# Patient Record
Sex: FEMALE | Race: WHITE | NOT HISPANIC OR LATINO | Employment: OTHER | URBAN - METROPOLITAN AREA
[De-identification: names, ages, dates, MRNs, and addresses within clinical notes are randomized per-mention and may not be internally consistent; named-entity substitution may affect disease eponyms.]

---

## 2017-01-13 ENCOUNTER — OFFICE VISIT (OUTPATIENT)
Dept: DERMATOLOGY | Facility: CLINIC | Age: 72
End: 2017-01-13

## 2017-01-13 ENCOUNTER — ONCOLOGY VISIT (OUTPATIENT)
Dept: ONCOLOGY | Facility: CLINIC | Age: 72
End: 2017-01-13
Payer: COMMERCIAL

## 2017-01-13 VITALS
RESPIRATION RATE: 18 BRPM | OXYGEN SATURATION: 97 % | SYSTOLIC BLOOD PRESSURE: 120 MMHG | HEART RATE: 96 BPM | WEIGHT: 167.5 LBS | TEMPERATURE: 97 F | DIASTOLIC BLOOD PRESSURE: 67 MMHG | BODY MASS INDEX: 30.63 KG/M2

## 2017-01-13 DIAGNOSIS — L81.4 SOLAR LENTIGINOSIS: Primary | ICD-10-CM

## 2017-01-13 DIAGNOSIS — L71.9 ROSACEA: ICD-10-CM

## 2017-01-13 DIAGNOSIS — Z79.899 ENCOUNTER FOR LONG-TERM CURRENT USE OF MEDICATION: ICD-10-CM

## 2017-01-13 DIAGNOSIS — Z12.11 COLON CANCER SCREENING: ICD-10-CM

## 2017-01-13 DIAGNOSIS — R20.2 NOTALGIA PARESTHETICA: ICD-10-CM

## 2017-01-13 DIAGNOSIS — C83.30 DLBCL (DIFFUSE LARGE B CELL LYMPHOMA) (H): Primary | ICD-10-CM

## 2017-01-13 DIAGNOSIS — L82.1 SK (SEBORRHEIC KERATOSIS): ICD-10-CM

## 2017-01-13 DIAGNOSIS — L21.9 DERMATITIS, SEBORRHEIC: ICD-10-CM

## 2017-01-13 DIAGNOSIS — D18.01 CHERRY ANGIOMA: ICD-10-CM

## 2017-01-13 DIAGNOSIS — Z51.81 ENCOUNTER FOR THERAPEUTIC DRUG MONITORING: ICD-10-CM

## 2017-01-13 DIAGNOSIS — C83.30 DLBCL (DIFFUSE LARGE B CELL LYMPHOMA) (H): ICD-10-CM

## 2017-01-13 DIAGNOSIS — Z12.31 VISIT FOR SCREENING MAMMOGRAM: ICD-10-CM

## 2017-01-13 LAB
ALBUMIN SERPL-MCNC: 3.6 G/DL (ref 3.4–5)
ALP SERPL-CCNC: 73 U/L (ref 40–150)
ALT SERPL W P-5'-P-CCNC: 26 U/L (ref 0–50)
ANION GAP SERPL CALCULATED.3IONS-SCNC: 7 MMOL/L (ref 3–14)
AST SERPL W P-5'-P-CCNC: 15 U/L (ref 0–45)
BASOPHILS # BLD AUTO: 0 10E9/L (ref 0–0.2)
BASOPHILS NFR BLD AUTO: 0.1 %
BILIRUB SERPL-MCNC: 0.2 MG/DL (ref 0.2–1.3)
BUN SERPL-MCNC: 19 MG/DL (ref 7–30)
CALCIUM SERPL-MCNC: 8.8 MG/DL (ref 8.5–10.1)
CHLORIDE SERPL-SCNC: 107 MMOL/L (ref 94–109)
CO2 SERPL-SCNC: 28 MMOL/L (ref 20–32)
CREAT SERPL-MCNC: 0.89 MG/DL (ref 0.52–1.04)
DIFFERENTIAL METHOD BLD: ABNORMAL
EOSINOPHIL # BLD AUTO: 0 10E9/L (ref 0–0.7)
EOSINOPHIL NFR BLD AUTO: 0.3 %
ERYTHROCYTE [DISTWIDTH] IN BLOOD BY AUTOMATED COUNT: 13.9 % (ref 10–15)
GFR SERPL CREATININE-BSD FRML MDRD: 62 ML/MIN/1.7M2
GLUCOSE SERPL-MCNC: 149 MG/DL (ref 70–99)
HCT VFR BLD AUTO: 40.3 % (ref 35–47)
HGB BLD-MCNC: 13.2 G/DL (ref 11.7–15.7)
LDH SERPL L TO P-CCNC: 191 U/L (ref 81–234)
LYMPHOCYTES # BLD AUTO: 0.4 10E9/L (ref 0.8–5.3)
LYMPHOCYTES NFR BLD AUTO: 5.9 %
MCH RBC QN AUTO: 30.3 PG (ref 26.5–33)
MCHC RBC AUTO-ENTMCNC: 32.8 G/DL (ref 31.5–36.5)
MCV RBC AUTO: 93 FL (ref 78–100)
MONOCYTES # BLD AUTO: 0.4 10E9/L (ref 0–1.3)
MONOCYTES NFR BLD AUTO: 5.2 %
NEUTROPHILS # BLD AUTO: 6.2 10E9/L (ref 1.6–8.3)
NEUTROPHILS NFR BLD AUTO: 88.5 %
PLATELET # BLD AUTO: 176 10E9/L (ref 150–450)
POTASSIUM SERPL-SCNC: 4.3 MMOL/L (ref 3.4–5.3)
PROT SERPL-MCNC: 6.3 G/DL (ref 6.8–8.8)
RBC # BLD AUTO: 4.35 10E12/L (ref 3.8–5.2)
SODIUM SERPL-SCNC: 142 MMOL/L (ref 133–144)
URATE SERPL-MCNC: 4.1 MG/DL (ref 2.6–6)
WBC # BLD AUTO: 7 10E9/L (ref 4–11)

## 2017-01-13 PROCEDURE — 80053 COMPREHEN METABOLIC PANEL: CPT | Performed by: INTERNAL MEDICINE

## 2017-01-13 PROCEDURE — 83615 LACTATE (LD) (LDH) ENZYME: CPT | Performed by: INTERNAL MEDICINE

## 2017-01-13 PROCEDURE — 84550 ASSAY OF BLOOD/URIC ACID: CPT | Performed by: INTERNAL MEDICINE

## 2017-01-13 PROCEDURE — 99214 OFFICE O/P EST MOD 30 MIN: CPT | Performed by: INTERNAL MEDICINE

## 2017-01-13 PROCEDURE — 85025 COMPLETE CBC W/AUTO DIFF WBC: CPT | Performed by: INTERNAL MEDICINE

## 2017-01-13 PROCEDURE — 36415 COLL VENOUS BLD VENIPUNCTURE: CPT | Performed by: INTERNAL MEDICINE

## 2017-01-13 ASSESSMENT — PAIN SCALES - GENERAL: PAINLEVEL: NO PAIN (0)

## 2017-01-13 NOTE — PROGRESS NOTES
Oncology Follow-up visit:  Date on this visit: 1/13/2017    Primary Care Physician:   Dr. Edwin Escalante  Rheumatology team: Abhijeet Lin.   cc note: Dr. Heath Koenig, [place], [place].  Rad Onc: Dr. Rosa, [facility]    Dx:  Diffuse large B cell lymphoma    Oncologic History:  She woke up on 3/19/2015 with R chest wall bump. CT chest with contrast on 3/31/2015 showed a soft tissue lesion inseparable from the right costosternal junction which measured 6.1x6.6cm. There was no associated mediastinal, hilar or axillary lymphadenopathy. She underwent an US and a biopsy of the chest wall lesion and pathology showed findings c/w Diffuse large B cell lymphoma, germinal center type large B-cell lymphoma. The lymphoma cells were CD20+, BCL-6+, and CD23+. BCL-2 demonstrated only scattered weak equivocal staining. There was no significant staining for CD30, CD10, MUM-1, or CD15 is noted in the cells of interest. Ki-67 demonstrated a high proliferation rate estimated at 90% of the tumor cells. Chromogenic in situ  hybridization for EBV (VÍCTOR) was  negative in the lymphomatous cells.  Bone marrow biopsy performed on 4/8/2015 showed no morphologic evidence of lymphoma. There was no immunophenotypic evidence of lymphoma.  She saw Dr. Benavidez with hematology/oncology in [facility] on 4/8/2015 and was recommended to enroll in a clinical trial, SOWG  where patients with early stage DLBCL are given RCHOPx3 followed by PET ct scan and it the PET is positive, she would get involved field radiation and Zevalin and if the PET is negaitve she would get an additional cycle of RCHOP.   She sought a second opinion with us.   PET/CT of the chest, abdomen and pelvis on 4/10/2015 showed multiple hypermetabolic soft tissue nodules in the right anterior chest wall as well as a hypermetabolic right supraclavicular lymph node (hypermetabolic, 1.2 x 1.6 cm).  There was a 3.0 x 5.1 cm soft tissue mass at  the right second sternocostal junction is hypermetabolic with a maximum SUV of 15.3 and a 0.8 x 2.0 cm soft tissue nodule between the third and fourth ribs anteriorly which was mildly hypermetabolic with a maximum SUV of 6.9. 0.7 x 1.9 and 1.4 x 2.4 cm soft tissue nodules at the fourth sternocostal junction have a maximal  SUV of 12.9 and 7.1, respectively. Additionally, there was a  2 mm right upper lobe pulmonary nodule.    Given extranodal disease, she had an LP performed at [facility] on 4/13/2015. 4cc clear CSF removed. 2 cc sent for flow cytometry. Cytology showed rare lymphocytes that showed smooth nuclear contours. Flow cytometry did not show a monoclonal B cell population.      She proceeded with cycle 1 of R-CHOP on 4/20/2015, with Neulasta support. She has received 3 cycles so far, last on 6/2/2015.  She has tolerated chemotherapy very well and her R chest wall mass has almost entirely disappeared over the course of the first 3-4 days of the first cycle.   She underwent PET/CT scan on 6/20/2015 which showed complete radiographic response to treatment.  She has completed 3000cGY to R anterior CW from 7/2/2015-7/22/2015 under direction of Dr. Foss in [place].        Echocardiogram 4/10/2015:  Left Ventricle EF of 60-65%. RV function normal. Mild left atrial enlargement.      History Of Present Illness:  Ms. Garcia is a 71 year old female who presents for follow-up of treated Diffuse large B cell lymphoma, as above.  She moved to [place] but continues to visit family in [facility] and continues her f/up with us. She was last seen by Dr. Sotomayor in September 2016, and at that time was continued on hydroxychloriquine and prednisone with plan for her to taper off prednisone when she establishes rheumatologic care in [place]. She still has pain in her joints, which she rates at 4/10.  Her last CT chest on 6/20/2016 showed no e/o locally recurrent or metastatic lymphoma.  Changes of radiation therapy  were less conspicuous.   Screening mammogram was negative on 6/20/2016. She was seen by dermatology earlier today in f/up of rosacea and another f/up is planned in 1 year. She denies fevers, chills, night sweats, weight loss. She has no other health related complaints.  In addition, a complete 12 point  review of systems is negative.    Past Medical/Surgical History:  Past Medical History   Diagnosis Date     Rheumatoid arthritis(714.0)      Chronic pain      Steroid long-term use      Psoriasis      Past Surgical History   Procedure Laterality Date     Surgical history of -   1/19/2011     Cataract, right eye     Extracapsular cataract extration with intraocular lens implant       right eye 1/2011     Arthroscopy knee bilateral       Synovectomy hip       not hip/ bilateral hands     Arthroplasty knee  9/20/2011     Procedure:ARTHROPLASTY KNEE; Left Total Knee Replacement ; Surgeon:BLAYNE GAYTAN; Location:[facility]     Phacoemulsification clear cornea with standard intraocular lens implant  4/16/2013     Procedure: PHACOEMULSIFICATION CLEAR CORNEA WITH STANDARD INTRAOCULAR LENS IMPLANT;  LEFT EYE PHACOEMULSIFICATION CLEAR CORNEA WITH STANDARD INTRAOCULAR LENS IMPLANT ;  Surgeon: Tawanda Sanchez MD;  Location: [facility]   She saw Dr. Sanchez in 2015. and had a L groin lesion biopsied and it was c/w psoriasis on Bx and she will be following up with him.   As far as RA, she was diagnosed in 2000, had focally erosive disease and has been on Enbrel and Remicade in the past. Prior to Dx of DLBCL she was on Humira and MTX.   FHx and Social Hx reviewed.    Allergies:  Allergies as of 01/13/2017 - reviewed 01/13/2017   Allergen Reaction Noted     Codeine Nausea and Vomiting 01/31/2006     Ibuprofen Itching 07/06/2005     Sulfa drugs Hives 01/31/2006     Current Medications:  Current Outpatient Prescriptions   Medication Sig Dispense Refill     azelaic acid (AZELEX) 20 % cream Apply topically 2 times daily 30 g 11      predniSONE (DELTASONE) 5 MG tablet Take 4 tablets (20 mg) daily for 2 weeks, then take 2 tablets (10 mg) daily for 2 weeks, then back to 5 mg daily. 98 tablet 0     hydroxychloroquine (PLAQUENIL) 200 MG tablet Take 2 tablets (400 mg) by mouth daily 180 tablet 0     predniSONE (DELTASONE) 5 MG tablet Take 1 tablet (5 mg) by mouth daily 90 tablet 0     omeprazole (PRILOSEC) 20 MG capsule Take 1 capsule (20 mg) by mouth daily 90 capsule 3     augmented betamethasone dipropionate (DIPROLENE AF) 0.05 % cream Apply topically 2 times daily 30 g 0     calcipotriene 0.005 % OINT Can use twice daily to affected areas of psoriasis, in between topical steroid uses. 120 g 1     triamcinolone (KENALOG) 0.1 % ointment Apply to affected area twice daily for two weeks at a time. 30 g 2     desonide (DESOWEN) 0.05 % ointment Apply topically 2 times daily Apply to affected areas of face twice daily until resolved, then taper to once daily for one week. 60 g 1     ketoconazole (NIZORAL) 2 % cream Apply topically 2 times daily 60 g 1     ORDER FOR DME Equipment being ordered: cranial prosthesis.  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 2 Units 0     MULTIPLE VITAMIN PO Take 1 tablet by mouth       benzonatate (TESSALON) 100 MG capsule Take 1 capsule (100 mg) by mouth 3 times daily as needed 42 capsule 1     Azelaic Acid (FINACEA) 15 % gel Apply 0.5 inches topically See Admin Instructions massage thin film gently into afected areas morning and evening 150 g 3     Cholecalciferol (VITAMIN D) 2000 UNITS CAPS Take 1 capsule by mouth daily.       Calcium Carbonate-Vitamin D (CALCIUM 600+D PO) Take 1,200 mg by mouth daily.       aspirin 81 MG tablet Take 1 tablet by mouth daily.       ORDER FOR DME A pair of foot orthotics. 1 Package 0     acetaminophen (TYLENOL) 325 MG tablet Take 2 tablets by mouth 3 times daily as needed. 60 tablet 0     magnesium oxide (MAG-OX) 400 MG tablet Take 1 tablet by mouth daily. 120  tablet 3        Physical Exam:  /67 mmHg  Pulse 96  Temp(Src) 97  F (36.1  C) (Oral)  Resp 18  Wt 75.978 kg (167 lb 8 oz)  SpO2 97%      GENERAL APPEARANCE: healthy, alert and no distress     HENT: Mouth without ulcers or lesions.      NECK: no adenopathy, no asymmetry or masses     LYMPHATICS: No cervical, supraclavicular, axillary lymphadenopathy b/l     RESP: lungs clear to auscultation - no rales, rhonchi or wheezes     CARDIOVASCULAR: regular rates and rhythm, normal S1 S2, no S3 or S4 and no murmur.     ABDOMEN:  soft, nontender, no HSM or masses and bowel sounds normal     MUSCULOSKELETAL: extremities normal- no gross deformities noted, no evidence of inflammation in joints, FROM in all extremities. No edema b/l LE.  Chest: No right chest wall masses. Incision healed well.      SKIN: no suspicious lesions or rashes     PSYCHIATRIC: mentation appears normal and affect normal  Breast: No axillary lymphadenopathy b/l.    Laboratory/Imaging Studies  Orders Only on 01/13/2017   Component Date Value Ref Range Status     WBC 01/13/2017 7.0  4.0 - 11.0 10e9/L Final     RBC Count 01/13/2017 4.35  3.8 - 5.2 10e12/L Final     Hemoglobin 01/13/2017 13.2  11.7 - 15.7 g/dL Final     Hematocrit 01/13/2017 40.3  35.0 - 47.0 % Final     MCV 01/13/2017 93  78 - 100 fl Final     MCH 01/13/2017 30.3  26.5 - 33.0 pg Final     MCHC 01/13/2017 32.8  31.5 - 36.5 g/dL Final     RDW 01/13/2017 13.9  10.0 - 15.0 % Final     Platelet Count 01/13/2017 176  150 - 450 10e9/L Final     Diff Method 01/13/2017 Automated Method   Final     % Neutrophils 01/13/2017 88.5   Final     % Lymphocytes 01/13/2017 5.9   Final     % Monocytes 01/13/2017 5.2   Final     % Eosinophils 01/13/2017 0.3   Final     % Basophils 01/13/2017 0.1   Final     Absolute Neutrophil 01/13/2017 6.2  1.6 - 8.3 10e9/L Final     Absolute Lymphocytes 01/13/2017 0.4* 0.8 - 5.3 10e9/L Final     Absolute Monocytes 01/13/2017 0.4  0.0 - 1.3 10e9/L Final     Absolute  Eosinophils 01/13/2017 0.0  0.0 - 0.7 10e9/L Final     Absolute Basophils 01/13/2017 0.0  0.0 - 0.2 10e9/L Final     Sodium 01/13/2017 142  133 - 144 mmol/L Final     Potassium 01/13/2017 4.3  3.4 - 5.3 mmol/L Final     Chloride 01/13/2017 107  94 - 109 mmol/L Final     Carbon Dioxide 01/13/2017 28  20 - 32 mmol/L Final     Anion Gap 01/13/2017 7  3 - 14 mmol/L Final     Glucose 01/13/2017 149* 70 - 99 mg/dL Final     Urea Nitrogen 01/13/2017 19  7 - 30 mg/dL Final     Creatinine 01/13/2017 0.89  0.52 - 1.04 mg/dL Final     GFR Estimate 01/13/2017 62  >60 mL/min/1.7m2 Final    Non  GFR Calc     GFR Estimate If Black 01/13/2017 75  >60 mL/min/1.7m2 Final    African American GFR Calc     Calcium 01/13/2017 8.8  8.5 - 10.1 mg/dL Final     Bilirubin Total 01/13/2017 0.2  0.2 - 1.3 mg/dL Final     Albumin 01/13/2017 3.6  3.4 - 5.0 g/dL Final     Protein Total 01/13/2017 6.3* 6.8 - 8.8 g/dL Final     Alkaline Phosphatase 01/13/2017 73  40 - 150 U/L Final     ALT 01/13/2017 26  0 - 50 U/L Final     AST 01/13/2017 15  0 - 45 U/L Final     Lactate Dehydrogenase 01/13/2017 191  81 - 234 U/L Final     Uric Acid 01/13/2017 4.1  2.6 - 6.0 mg/dL Final     BG was post-prandial after having white chocolate and cashews    ASSESSMENT/PLAN:  The patient is a very pleasant 71 year old woman with stage IIAE Diffuse large B cell lymphoma involving right anterior chest wall, right supraclavicular LN and eroding into sternum. She falls in the low-intermediate risk IPI group when not adjusted for age, with IPI score of 2 which with associated with 67% CR rate with treatment and 51% 5 year overall survival rate. If adjusted for age, she would fall into low risk or IPI of 0 score, associated with CR rate of 91% and 56% 5 year overall survival.  She has had an excellent clinical response after one cycle of RCHOP and is in complete radiographic CR after 3 cycles of  R-CHOP, last on 6/2/2015. She has completed 3000cGY to R  anterior CW from 7/2/2015-7/22/2015 under direction of Dr. Foss in [place].      1. Diffuse large B cell lymphoma -  No e/o disease recurrence on CT chest form 6/2016. No e/o disease recurrence clinically either.  Per NCCN guidelines (V2.2015) f/up of stage II Diffuse large B cell lymphoma includes H&P and labs every 3-6 months for 5 years, then annually and imaging as clinically indicated.  F/up in 6 months with labs. Patient plans to return to [place] for a couple of weeks in June    2. Breast Cancer Screening - b/l screening mammogram negative 6/20/2016. F/up in one year (due 06/2017).  3. Colon Cancer Screening - colonoscopy on 4/16/2015. The exam was normal and repeat was recommended in 5 years for screening purposes     4. RA - currently off MTX.  She is on plaquenil and  Prednisone. F/up with rheumatology in [place]. She is considering trying Rituxan to control s/o RA. I have no objections to that.    5. Social - She lives in [place] now with her daughter's family but comes back to [place] to visit family here to and prefers to f/up with us when she is here.  6. Psoriasis- f/up with dermatology in one year - due 01/2018.    At the end of our visit patient verbalized understanding and concurred with the plan.

## 2017-01-13 NOTE — NURSING NOTE
"Randi Garcia is a 71 year old female who presents for:  Chief Complaint   Patient presents with     Oncology Clinic Visit     6 month follow up        Initial Vitals:  /67 mmHg  Pulse 96  Temp(Src) 97  F (36.1  C) (Oral)  Resp 18  Wt 75.978 kg (167 lb 8 oz)  SpO2 97% Estimated body mass index is 30.63 kg/(m^2) as calculated from the following:    Height as of 6/24/16: 1.575 m (5' 2.01\").    Weight as of this encounter: 75.978 kg (167 lb 8 oz).. There is no height on file to calculate BSA. BP completed using cuff size: regular  Data Unavailable No LMP recorded. Patient is postmenopausal. Allergies and medications reviewed.     Medications: Medication refills not needed today.  Pharmacy name entered into EPIC:    [facility]  [facility] [facility] - [facility], [place] - [address] [place] [address] [facility] [place] - [address] [place] [address] [address] [address] [place], [place] [address] [place] [address] - [place], [place] - [address] [id number] - [place], [place] - [address] [id number] - [facility] [address]    Comments:     10 minutes for nursing intake (face to face time)   Rose Abbott LPN          "

## 2017-01-13 NOTE — LETTER
"1/13/2017       RE: Randi Garcia  [address] [facility] [address]     Dear Colleague,    Thank you for referring your patient, Randi Garcia, to the [facility] DERMATOLOGY at [facility]. Please see a copy of my visit note below.    [facility] Dermatology Note    Dermatology Problem List:  1. Rosacea- using azelaic acid 15% bid.   2. Seborrheic dermatitis- using desonide ointment bid prn  3. Inverse psoriasis- resolved  4. History of B cell lymphoma- s/p chemo (R-CHOP) and radiation to the chest wall    Encounter Date: Jan 13, 2017    CC:  Chief Complaint   Patient presents with     Skin Check     Randi comes to clinic for a skin check. 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  Mrs. Garcia is currently using azelaic acid on her face once daily.  She likes this and feels that it mostly keeps skin under control. She has had more scaling on her face as of recently.  Her only new concern is itching on the right side of her back.  Her daughter has looked at the skin and told her there is no rash.  She wonders what this could be from.  She denies any tender, rapidly growing, or nonhealing skin lesions today.  She denies any past history of skin cancer.      She is feeling well otherwise, without other concerning skin lesions.      Past Medical History:   Past Medical History   Diagnosis Date     Rheumatoid arthritis(714.0) (H)      Chronic pain      Steroid long-term use      Psoriasis      Past Surgical History   Procedure Laterality Date     Surgical history of -   1/19/2011     Cataract, right eye     Extracapsular cataract extration with intraocular lens implant       right eye 1/2011     Arthroscopy knee bilateral       " Synovectomy hip       not hip/ bilateral hands     Arthroplasty knee  9/20/2011     Procedure:ARTHROPLASTY KNEE; Left Total Knee Replacement ; Surgeon:BLAYNE GAYTAN; Location[facility]     Phacoemulsification clear cornea with standard intraocular lens implant  4/16/2013     Procedure: PHACOEMULSIFICATION CLEAR CORNEA WITH STANDARD INTRAOCULAR LENS IMPLANT;  LEFT EYE PHACOEMULSIFICATION CLEAR CORNEA WITH STANDARD INTRAOCULAR LENS IMPLANT ;  Surgeon: Tawanda Sanchez MD;  Location: [facility]     Medications:  Current Outpatient Prescriptions   Medication Sig Dispense Refill     predniSONE (DELTASONE) 5 MG tablet Take 4 tablets (20 mg) daily for 2 weeks, then take 2 tablets (10 mg) daily for 2 weeks, then back to 5 mg daily. 98 tablet 0     hydroxychloroquine (PLAQUENIL) 200 MG tablet Take 2 tablets (400 mg) by mouth daily 180 tablet 0     predniSONE (DELTASONE) 5 MG tablet Take 1 tablet (5 mg) by mouth daily 90 tablet 0     omeprazole (PRILOSEC) 20 MG capsule Take 1 capsule (20 mg) by mouth daily 90 capsule 3     augmented betamethasone dipropionate (DIPROLENE AF) 0.05 % cream Apply topically 2 times daily 30 g 0     calcipotriene 0.005 % OINT Can use twice daily to affected areas of psoriasis, in between topical steroid uses. 120 g 1     triamcinolone (KENALOG) 0.1 % ointment Apply to affected area twice daily for two weeks at a time. 30 g 2     desonide (DESOWEN) 0.05 % ointment Apply topically 2 times daily Apply to affected areas of face twice daily until resolved, then taper to once daily for one week. 60 g 1     ketoconazole (NIZORAL) 2 % cream Apply topically 2 times daily 60 g 1     ORDER FOR DME Equipment being ordered: cranial prosthesis.  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 2 Units 0     MULTIPLE VITAMIN PO Take 1 tablet by mouth       benzonatate (TESSALON) 100 MG capsule Take 1 capsule (100 mg) by mouth 3 times daily as needed 42 capsule 1     Azelaic Acid (FINACEA) 15 %  gel Apply 0.5 inches topically See Admin Instructions massage thin film gently into afected areas morning and evening 150 g 3     Cholecalciferol (VITAMIN D) 2000 UNITS CAPS Take 1 capsule by mouth daily.       Calcium Carbonate-Vitamin D (CALCIUM 600+D PO) Take 1,200 mg by mouth daily.       aspirin 81 MG tablet Take 1 tablet by mouth daily.       ORDER FOR DME A pair of foot orthotics. 1 Package 0     acetaminophen (TYLENOL) 325 MG tablet Take 2 tablets by mouth 3 times daily as needed. 60 tablet 0     magnesium oxide (MAG-OX) 400 MG tablet Take 1 tablet by mouth daily. 120 tablet 3       Allergies   Allergen Reactions     Codeine Nausea and Vomiting     Ibuprofen Itching     Sulfa Drugs Hives     Social History:  Recently moved to [place].  Spending more time outdoors.    Family History:  Granddaughter with psoriasis    Review of Systems:  -Constitutional:  Feeling well, in usual state of health.  -Skin:  As per HPI, no additional concerns.    Physical exam:  There were no vitals taken for this visit.  -General:  This is a well developed, well-nourished female in no acute distress, in a pleasant mood.    -Skin:  Total skin excluding the undergarment areas was performed. The exam included the head/face, neck, both arms, chest, back, abdomen, both legs, digits and/or nails.   -Mild pink erythema with telangiectasias on the bilateral cheeks.  There are no papules or pustules.  -No crusting or scaling of the face.  -Light brown circular macule in sun exposed areas on the upper extremities, chest, upper back, and face.  All have uniform pigmentation.  -Pink dome shaped papules on the trunk.  -No skin lesions on area just to the right of midline near the scapula on the back.  -Brown and tan, waxy, stuck on appearing papules on the trunk.  -No other lesions of concern on areas examined.     Impression/Plan:  1. Rosacea:  Under good control at present with only remaining telangiectasias and no papular/pustular lesions.   Discussed laser treatments for telangiectasias.  Patient not interested at this time.  Encouraged increasing azelaic acid 20% cream use up to twice daily for control of papular/pustular lesions should they recur.    2. Solar lentigines: Discussed that these are a marker of past sun damage, but are harmless.  Recommended use of a broad spectrum sunscreen of at least SPF 30 on all sun exposed sites daily.  Apply 20 minutes prior to exposure and repeat application every two hours or after sweating or swimming.  Avoid any intentional indoor or outdoor tanning.      3. Cherry angiomas: Reassured of benign nature.  No need for further treatment.    4. Seborrheic keratoses:  Reassured of benign nature.  No need for further treatment.    5. Notalgia paresthetica:  Discussed etiology of back itch in detail.  Recommended use of over the counter Sarna lotion with camphor and methol to be used as needed.    6. Seb derm:  No currently active.  Recommended gentle skin care only at this time.    Follow up in one year for skin check.    Seen and examined with Dr. Sawyer Sanchez.    Kennedi Coughlin MD  PGY-3, Dermatology  Randi Garcia was seen and examined by my self with the resident. I have reviewed and agree with assesment and plan of care.       Again, thank you for allowing me to participate in the care of your patient.      Sincerely,    Sawyer Sanchez MD

## 2017-01-13 NOTE — PATIENT INSTRUCTIONS
For moisturizing the face, we like Vanicream, Cetaphil, and Cerave brands.  See dry skin care for more details.    For rosacea on the face, continue the azelaic acid.  You can apply this twice daily.                Dry Skin    What is dry skin?    Common skin problem    Can be worse during the winter     Affects all ages    Occurs in people with or without other skin problems    What does it look like?    Fine lines in the skin become more visible     Rough feeling skin     Flaky skin    Most common on the arms and legs    Skin can become cracked, especially on the hands and feet    What are some problems caused by dry skin?     Itching    Rubbing or scratching can cause thickened, rough skin patches    Cracks in skin can be painful    Red, itchy, scaly skin (called eczema) can occur    Yellow crusting or pus could be signs of an infection    What causes dry skin?    A lack of water in the top layer of the skin    Too much soapy water,  hot water, or harsh chemicals    Aging and sun damage    How do I treat dry skin?    Shower or bathe daily for under ten minutes with lukewarm water and mild soap.    Pat yourself dry with a towel gently and leave your skin slightly damp.    Use moisturizing cream or ointment right away.  Avoid lotions.    What kind of mild soap should I be using?    Camay , Dove , Tone , Neutrogena , Purpose , or Oil of Olay     A non-detergent cleanser, like Cetaphil , can be used.    What should I stay away from?    Scented soaps     Bath oils    What moisturizers should I be using?    Cetaphil Cream,CeraVe Cream, Vanicream, Aquaphilic, Eucerin, Aquaphor, or Vaseline     Always apply after showering or bathing.    Reapply throughout the day, if possible.    If dry skin affects your hands, always reapply after handwashing.    What else should I know?    Using a humidifier during winter months may help.    If dry skin gets worse or if eczema develops, a steroid cream may be needed.

## 2017-01-13 NOTE — NURSING NOTE
"Dermatology Rooming Note    Randi Garcia's goals for this visit include:   Chief Complaint   Patient presents with     Skin Check     Randi comes to clinic for a skin check. Randi states \" my rosacea is scaling.\"     Elsi Solorzano LPN  "

## 2017-01-13 NOTE — MR AVS SNAPSHOT
After Visit Summary   1/13/2017    Randi Garcia    MRN: [id number]           Patient Information     Date Of Birth          1945        Visit Information        Provider Department      1/13/2017 10:15 AM Sawyer Sanchez MD M [facility] Dermatology        Today's Diagnoses     Solar lentiginosis    -  1     Cherry angioma         SK (seborrheic keratosis)         Dermatitis, seborrheic         Rosacea         Notalgia paresthetica           Care Instructions    For moisturizing the face, we like Vanicream, Cetaphil, and Cerave brands.  See dry skin care for more details.    For rosacea on the face, continue the azelaic acid.  You can apply this twice daily.                Dry Skin    What is dry skin?    Common skin problem    Can be worse during the winter     Affects all ages    Occurs in people with or without other skin problems    What does it look like?    Fine lines in the skin become more visible     Rough feeling skin     Flaky skin    Most common on the arms and legs    Skin can become cracked, especially on the hands and feet    What are some problems caused by dry skin?     Itching    Rubbing or scratching can cause thickened, rough skin patches    Cracks in skin can be painful    Red, itchy, scaly skin (called eczema) can occur    Yellow crusting or pus could be signs of an infection    What causes dry skin?    A lack of water in the top layer of the skin    Too much soapy water,  hot water, or harsh chemicals    Aging and sun damage    How do I treat dry skin?    Shower or bathe daily for under ten minutes with lukewarm water and mild soap.    Pat yourself dry with a towel gently and leave your skin slightly damp.    Use moisturizing cream or ointment right away.  Avoid lotions.    What kind of mild soap should I be using?    Camay , Dove , Tone , Neutrogena , Purpose , or Oil of Olay     A non-detergent cleanser, like Cetaphil , can be used.    What should I stay away  from?    Scented soaps     Bath oils    What moisturizers should I be using?    Cetaphil Cream,CeraVe Cream, Vanicream, Aquaphilic, Eucerin, Aquaphor, or Vaseline     Always apply after showering or bathing.    Reapply throughout the day, if possible.    If dry skin affects your hands, always reapply after handwashing.    What else should I know?    Using a humidifier during winter months may help.    If dry skin gets worse or if eczema develops, a steroid cream may be needed.                        Follow-ups after your visit        Your next 10 appointments already scheduled     Jan 13, 2017  2:45 PM   LAB with [facility])    [id number] [place] [id number] [phone]           Patient must bring picture ID.  Patient should be prepared to give a urine specimen  Please do not eat 10-12 hours before your appointment if you are coming in fasting for labs on lipids, cholesterol, or glucose (sugar).  Pregnant women should follow their Care Team instructions. Water with medications is okay. Do not drink coffee or other fluids.   If you have concerns about taking  your medications, please ask at office or if scheduling via [employer], send a message by clicking on Secure Messaging, Message Your Care Team.            Jan 13, 2017  3:30 PM   Return Visit with Sue Garcia MD   [facility])    [id number] [place] [id number] [phone]            Mar 24, 2017  8:30 AM   Return Visit with Martinez Sotomayor MD   [facility])    [address] [place] [id number] [phone]              Who to contact     Please call your clinic at [phone] to:    Ask questions about your health    Make or cancel appointments    Discuss your medicines    Learn about your test results    Speak to your doctor   If you have  compliments or concerns about an experience at your clinic, or if you wish to file a complaint, please contact [facility] Patient Relations at [phone] or email us at [email or web address]         Additional Information About Your Visit        MyChart Information     [employer] gives you secure access to your electronic health record. If you see a primary care provider, you can also send messages to your care team and make appointments. If you have questions, please call your primary care clinic.  If you do not have a primary care provider, please call [phone] and they will assist you.      [employer] is an electronic gateway that provides easy, online access to your medical records. With [employer], you can request a clinic appointment, read your test results, renew a prescription or communicate with your care team.     To access your existing account, please contact your [facility] or call [phone] for assistance.        Care EveryWhere ID     This is your Care EveryWhere ID. This could be used by other organizations to access your [place] medical records  [id number]         Blood Pressure from Last 3 Encounters:   09/23/16 136/67   06/24/16 124/70   05/06/16 122/61    Weight from Last 3 Encounters:   06/24/16 77.565 kg (171 lb)   05/06/16 78.245 kg (172 lb 8 oz)   03/08/16 76.93 kg (169 lb 9.6 oz)              Today, you had the following     No orders found for display         Today's Medication Changes          These changes are accurate as of: 1/13/17 11:08 AM.  If you have any questions, ask your nurse or doctor.               Start taking these medicines.        Dose/Directions    azelaic acid 20 % cream   Commonly known as:  AZELEX   Used for:  Rosacea   Started by:  Sawyer Sanchez MD        Apply topically 2 times daily   Quantity:  30 g   Refills:  11            Where to get your medicines      These medications were sent to  [facility] - [place], [place] - [address] [place] [address]  [address] [place] [address]     Phone:  [phone]    - azelaic acid 20 % cream             Primary Care Provider Office Phone # Fax #    Randal Franco [phone]       [facility] [address] [facility] [address]  [facility] [address]        Thank you!     Thank you for choosing [facility] DERMATOLOGY  for your care. Our goal is always to provide you with excellent care. Hearing back from our patients is one way we can continue to improve our services. Please take a few minutes to complete the written survey that you may receive in the mail after your visit with us. Thank you!             Your Updated Medication List - Protect others around you: Learn how to safely use, store and throw away your medicines at www.disposemymeds.org.          This list is accurate as of: 1/13/17 11:08 AM.  Always use your most recent med list.                   Brand Name Dispense Instructions for use    acetaminophen 325 MG tablet    TYLENOL    60 tablet    Take 2 tablets by mouth 3 times daily as needed.       aspirin 81 MG tablet      Take 1 tablet by mouth daily.       augmented betamethasone dipropionate 0.05 % cream    DIPROLENE AF    30 g    Apply topically 2 times daily       Azelaic Acid 15 % gel    FINACEA    150 g    Apply 0.5 inches topically See Admin Instructions massage thin film gently into afected areas morning and evening       azelaic acid 20 % cream    AZELEX    30 g    Apply topically 2 times daily       benzonatate 100 MG capsule    TESSALON    42 capsule    Take 1 capsule (100 mg) by mouth 3 times daily as needed       calcipotriene 0.005 % Oint     120 g    Can use twice daily to affected areas of psoriasis, in between topical steroid uses.       CALCIUM 600+D PO      Take 1,200 mg by mouth daily.       desonide 0.05 % ointment    DESOWEN    60 g    Apply topically 2 times daily Apply to affected areas of face twice  daily until resolved, then taper to once daily for one week.       hydroxychloroquine 200 MG tablet    PLAQUENIL    180 tablet    Take 2 tablets (400 mg) by mouth daily       ketoconazole 2 % cream    NIZORAL    60 g    Apply topically 2 times daily       magnesium oxide 400 MG tablet    MAG-OX    120 tablet    Take 1 tablet by mouth daily.       MULTIPLE VITAMIN PO      Take 1 tablet by mouth       omeprazole 20 MG CR capsule    priLOSEC    90 capsule    Take 1 capsule (20 mg) by mouth daily       order for DME     1 Package    A pair of foot orthotics.       order for DME     2 Units    Equipment being ordered: cranial prosthesis. 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       * predniSONE 5 MG tablet    DELTASONE    90 tablet    Take 1 tablet (5 mg) by mouth daily       * predniSONE 5 MG tablet    DELTASONE    98 tablet    Take 4 tablets (20 mg) daily for 2 weeks, then take 2 tablets (10 mg) daily for 2 weeks, then back to 5 mg daily.       triamcinolone 0.1 % ointment    KENALOG    30 g    Apply to affected area twice daily for two weeks at a time.       vitamin D 2000 UNITS Caps      Take 1 capsule by mouth daily.       * Notice:  This list has 2 medication(s) that are the same as other medications prescribed for you. Read the directions carefully, and ask your doctor or other care provider to review them with you.

## 2017-01-13 NOTE — PROGRESS NOTES
"[facility] Dermatology Note    Dermatology Problem List:  1. Rosacea- using azelaic acid 15% bid.   2. Seborrheic dermatitis- using desonide ointment bid prn  3. Inverse psoriasis- resolved  4. History of B cell lymphoma- s/p chemo (R-CHOP) and radiation to the chest wall    Encounter Date: Jan 13, 2017    CC:  Chief Complaint   Patient presents with     Skin Check     Randi comes to clinic for a skin check. 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  Mrs. Garcia is currently using azelaic acid on her face once daily.  She likes this and feels that it mostly keeps skin under control. She has had more scaling on her face as of recently.  Her only new concern is itching on the right side of her back.  Her daughter has looked at the skin and told her there is no rash.  She wonders what this could be from.  She denies any tender, rapidly growing, or nonhealing skin lesions today.  She denies any past history of skin cancer.      She is feeling well otherwise, without other concerning skin lesions.      Past Medical History:   Past Medical History   Diagnosis Date     Rheumatoid arthritis(714.0) (H)      Chronic pain      Steroid long-term use      Psoriasis      Past Surgical History   Procedure Laterality Date     Surgical history of -   1/19/2011     Cataract, right eye     Extracapsular cataract extration with intraocular lens implant       right eye 1/2011     Arthroscopy knee bilateral       Synovectomy hip       not hip/ bilateral hands     Arthroplasty knee  9/20/2011     Procedure:ARTHROPLASTY KNEE; Left Total Knee Replacement ; Surgeon:BLAYNE GAYTAN; Location:[facility] OR     Phacoemulsification clear cornea with standard intraocular lens implant  4/16/2013     " Procedure: PHACOEMULSIFICATION CLEAR CORNEA WITH STANDARD INTRAOCULAR LENS IMPLANT;  LEFT EYE PHACOEMULSIFICATION CLEAR CORNEA WITH STANDARD INTRAOCULAR LENS IMPLANT ;  Surgeon: Tawanda Sanchez MD;  Location: [facility]     Medications:  Current Outpatient Prescriptions   Medication Sig Dispense Refill     predniSONE (DELTASONE) 5 MG tablet Take 4 tablets (20 mg) daily for 2 weeks, then take 2 tablets (10 mg) daily for 2 weeks, then back to 5 mg daily. 98 tablet 0     hydroxychloroquine (PLAQUENIL) 200 MG tablet Take 2 tablets (400 mg) by mouth daily 180 tablet 0     predniSONE (DELTASONE) 5 MG tablet Take 1 tablet (5 mg) by mouth daily 90 tablet 0     omeprazole (PRILOSEC) 20 MG capsule Take 1 capsule (20 mg) by mouth daily 90 capsule 3     augmented betamethasone dipropionate (DIPROLENE AF) 0.05 % cream Apply topically 2 times daily 30 g 0     calcipotriene 0.005 % OINT Can use twice daily to affected areas of psoriasis, in between topical steroid uses. 120 g 1     triamcinolone (KENALOG) 0.1 % ointment Apply to affected area twice daily for two weeks at a time. 30 g 2     desonide (DESOWEN) 0.05 % ointment Apply topically 2 times daily Apply to affected areas of face twice daily until resolved, then taper to once daily for one week. 60 g 1     ketoconazole (NIZORAL) 2 % cream Apply topically 2 times daily 60 g 1     ORDER FOR DME Equipment being ordered: cranial prosthesis.  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 2 Units 0     MULTIPLE VITAMIN PO Take 1 tablet by mouth       benzonatate (TESSALON) 100 MG capsule Take 1 capsule (100 mg) by mouth 3 times daily as needed 42 capsule 1     Azelaic Acid (FINACEA) 15 % gel Apply 0.5 inches topically See Admin Instructions massage thin film gently into afected areas morning and evening 150 g 3     Cholecalciferol (VITAMIN D) 2000 UNITS CAPS Take 1 capsule by mouth daily.       Calcium Carbonate-Vitamin D (CALCIUM 600+D PO) Take 1,200 mg by  mouth daily.       aspirin 81 MG tablet Take 1 tablet by mouth daily.       ORDER FOR DME A pair of foot orthotics. 1 Package 0     acetaminophen (TYLENOL) 325 MG tablet Take 2 tablets by mouth 3 times daily as needed. 60 tablet 0     magnesium oxide (MAG-OX) 400 MG tablet Take 1 tablet by mouth daily. 120 tablet 3       Allergies   Allergen Reactions     Codeine Nausea and Vomiting     Ibuprofen Itching     Sulfa Drugs Hives     Social History:  Recently moved to [place].  Spending more time outdoors.    Family History:  Granddaughter with psoriasis    Review of Systems:  -Constitutional:  Feeling well, in usual state of health.  -Skin:  As per HPI, no additional concerns.    Physical exam:  There were no vitals taken for this visit.  -General:  This is a well developed, well-nourished female in no acute distress, in a pleasant mood.    -Skin:  Total skin excluding the undergarment areas was performed. The exam included the head/face, neck, both arms, chest, back, abdomen, both legs, digits and/or nails.   -Mild pink erythema with telangiectasias on the bilateral cheeks.  There are no papules or pustules.  -No crusting or scaling of the face.  -Light brown circular macule in sun exposed areas on the upper extremities, chest, upper back, and face.  All have uniform pigmentation.  -Pink dome shaped papules on the trunk.  -No skin lesions on area just to the right of midline near the scapula on the back.  -Brown and tan, waxy, stuck on appearing papules on the trunk.  -No other lesions of concern on areas examined.     Impression/Plan:  1. Rosacea:  Under good control at present with only remaining telangiectasias and no papular/pustular lesions.  Discussed laser treatments for telangiectasias.  Patient not interested at this time.  Encouraged increasing azelaic acid 20% cream use up to twice daily for control of papular/pustular lesions should they recur.    2. Solar lentigines: Discussed that these are a marker of  past sun damage, but are harmless.  Recommended use of a broad spectrum sunscreen of at least SPF 30 on all sun exposed sites daily.  Apply 20 minutes prior to exposure and repeat application every two hours or after sweating or swimming.  Avoid any intentional indoor or outdoor tanning.      3. Cherry angiomas: Reassured of benign nature.  No need for further treatment.    4. Seborrheic keratoses:  Reassured of benign nature.  No need for further treatment.    5. Notalgia paresthetica:  Discussed etiology of back itch in detail.  Recommended use of over the counter Sarna lotion with camphor and methol to be used as needed.    6. Seb derm:  No currently active.  Recommended gentle skin care only at this time.    Follow up in one year for skin check.    Seen and examined with Dr. Sawyer Sanchez.    Kennedi Coughlin MD  PGY-3, Dermatology  Randi Garcia was seen and examined by my self with the resident. I have reviewed and agree with assesment and plan of care.   Mich Sanchez

## 2017-01-16 DIAGNOSIS — Z96.60 H/O ARTHROPLASTY: Primary | ICD-10-CM

## 2017-01-16 RX ORDER — AMOXICILLIN 500 MG/1
2000 TABLET, FILM COATED ORAL ONCE
Qty: 4 TABLET | Refills: 3 | Status: SHIPPED | OUTPATIENT
Start: 2017-01-16 | End: 2017-01-16

## 2017-01-16 NOTE — TELEPHONE ENCOUNTER
Patient calling for antibiotics prior to her dental procedure. She is s/p total hip. Orders placed per standing order and sent to [facility] pharmacy per patient request.

## 2017-02-02 ENCOUNTER — MYC MEDICAL ADVICE (OUTPATIENT)
Dept: RHEUMATOLOGY | Facility: CLINIC | Age: 72
End: 2017-02-02

## 2017-02-02 DIAGNOSIS — M94.9 DISORDER OF BONE AND CARTILAGE: ICD-10-CM

## 2017-02-02 DIAGNOSIS — Z79.60 LONG-TERM USE OF IMMUNOSUPPRESSANT MEDICATION: ICD-10-CM

## 2017-02-02 DIAGNOSIS — M89.9 DISORDER OF BONE AND CARTILAGE: ICD-10-CM

## 2017-02-02 DIAGNOSIS — M06.09 RHEUMATOID ARTHRITIS OF MULTIPLE SITES WITH NEGATIVE RHEUMATOID FACTOR (H): Primary | ICD-10-CM

## 2017-02-02 DIAGNOSIS — Z79.899 ENCOUNTER FOR LONG-TERM CURRENT USE OF MEDICATION: ICD-10-CM

## 2017-02-06 RX ORDER — HYDROXYCHLOROQUINE SULFATE 200 MG/1
400 TABLET, FILM COATED ORAL DAILY
Qty: 180 TABLET | Refills: 0 | Status: SHIPPED | OUTPATIENT
Start: 2017-02-06

## 2017-02-06 NOTE — TELEPHONE ENCOUNTER
Refill request from: Patient  Medication requested: Plaquenil  Prescription Written: 9/23/16  Last filled: N/A  Last qty: 180  Pt's last office visit: 92/23/16  Next scheduled office visit: 3/24/17 ([facility] asked [facility]. Referral placed and will have clinic fax appropriate information with referral.    Last ophthalmology visit: Received last eye exam completed on 6/28/2016 at [facility] with Dr. Jevon Persaud; plan: RTC in one year.  Routing refill request to provider for review/approval because:  Medication meets criteria to refill though Drug not on the [facility] [place] RN refill protocol           WBC   Date Value Ref Range Status   01/13/2017 7.0 4.0 - 11.0 10e9/L Final     RBC COUNT   Date Value Ref Range Status   01/13/2017 4.35 3.8 - 5.2 10e12/L Final     HEMOGLOBIN   Date Value Ref Range Status   01/13/2017 13.2 11.7 - 15.7 g/dL Final     HEMATOCRIT   Date Value Ref Range Status   01/13/2017 40.3 35.0 - 47.0 % Final     MCV   Date Value Ref Range Status   01/13/2017 93 78 - 100 fl Final     MCH   Date Value Ref Range Status   01/13/2017 30.3 26.5 - 33.0 pg Final     MCHC   Date Value Ref Range Status   01/13/2017 32.8 31.5 - 36.5 g/dL Final     RDW   Date Value Ref Range Status   01/13/2017 13.9 10.0 - 15.0 % Final     PLATELET COUNT   Date Value Ref Range Status   01/13/2017 176 150 - 450 10e9/L Final   12/21/2015 181 150 - 450 10^9/L Final     AST   Date Value Ref Range Status   01/13/2017 15 0 - 45 U/L Final     ALT   Date Value Ref Range Status   01/13/2017 26 0 - 50 U/L Final     CREATININE   Date Value Ref Range Status   01/13/2017 0.89 0.52 - 1.04 mg/dL Final     ALBUMIN   Date Value Ref Range Status   01/13/2017 3.6 3.4 - 5.0 g/dL Final     COLOR URINE (no units)   Date Value   04/10/2015 Light Yellow     APPEARANCE URINE (no units)   Date Value   04/10/2015 Clear     GLUCOSE URINE (mg/dL)   Date Value   04/10/2015 Negative     BILIRUBIN URINE (no units)    Date Value   04/10/2015 Negative     KETONES URINE (mg/dL)   Date Value   04/10/2015 Negative     SPECIFIC GRAVITY URINE (no units)   Date Value   04/10/2015 1.050*     PH URINE (pH)   Date Value   04/10/2015 6.5     PROTEIN ALBUMIN URINE (mg/dL)   Date Value   04/10/2015 Negative     UROBILINOGEN URINE (EU/dL)   Date Value   01/10/2012 0.2     NITRITE URINE (no units)   Date Value   04/10/2015 Negative     LEUKOCYTE ESTERASE URINE (no units)   Date Value   04/10/2015 Negative       Teena Holland, RN, BSN  [facility]

## 2017-04-03 ENCOUNTER — TRANSFERRED RECORDS (OUTPATIENT)
Dept: HEALTH INFORMATION MANAGEMENT | Facility: CLINIC | Age: 72
End: 2017-04-03

## 2017-04-28 ENCOUNTER — CARE COORDINATION (OUTPATIENT)
Dept: RHEUMATOLOGY | Facility: CLINIC | Age: 72
End: 2017-04-28

## 2017-04-28 NOTE — PROGRESS NOTES
Cancelled Dr. Sotomayor's standing lab orders, patient moved and transferred care to [place]. Letter sent to HIMs to be scanned into chart.    Maisha Bhandari, OLEKSANDRN, RN, PHN  Rheumatology Care Coordinator  [facility]

## 2017-07-06 ENCOUNTER — TRANSFERRED RECORDS (OUTPATIENT)
Dept: HEALTH INFORMATION MANAGEMENT | Facility: CLINIC | Age: 72
End: 2017-07-06

## 2017-07-06 LAB
ALT SERPL-CCNC: 17 U/L
AST SERPL-CCNC: 19 U/L
CREAT SERPL-MCNC: 0.76 MG/DL (ref 0.4–1.4)
GFR SERPL CREATININE-BSD FRML MDRD: 78 ML/MIN/1.73M2
GLUCOSE SERPL-MCNC: 83 MG/DL (ref 70–100)
POTASSIUM SERPL-SCNC: 4.5 MMOL/L (ref 3.5–5.4)

## 2017-08-01 ENCOUNTER — RADIANT APPOINTMENT (OUTPATIENT)
Dept: MAMMOGRAPHY | Facility: CLINIC | Age: 72
End: 2017-08-01
Attending: INTERNAL MEDICINE
Payer: COMMERCIAL

## 2017-08-01 DIAGNOSIS — C83.30 DLBCL (DIFFUSE LARGE B CELL LYMPHOMA) (H): ICD-10-CM

## 2017-08-01 DIAGNOSIS — Z12.31 VISIT FOR SCREENING MAMMOGRAM: ICD-10-CM

## 2017-08-01 PROCEDURE — G0202 SCR MAMMO BI INCL CAD: HCPCS | Performed by: STUDENT IN AN ORGANIZED HEALTH CARE EDUCATION/TRAINING PROGRAM

## 2017-08-01 PROCEDURE — 77063 BREAST TOMOSYNTHESIS BI: CPT | Performed by: STUDENT IN AN ORGANIZED HEALTH CARE EDUCATION/TRAINING PROGRAM

## 2017-08-03 ENCOUNTER — ONCOLOGY VISIT (OUTPATIENT)
Dept: ONCOLOGY | Facility: CLINIC | Age: 72
End: 2017-08-03
Payer: COMMERCIAL

## 2017-08-03 VITALS
BODY MASS INDEX: 32.2 KG/M2 | SYSTOLIC BLOOD PRESSURE: 111 MMHG | OXYGEN SATURATION: 97 % | RESPIRATION RATE: 20 BRPM | WEIGHT: 175 LBS | TEMPERATURE: 97.4 F | HEART RATE: 86 BPM | HEIGHT: 62 IN | DIASTOLIC BLOOD PRESSURE: 65 MMHG

## 2017-08-03 DIAGNOSIS — Z23 NEED FOR PNEUMOCOCCAL VACCINATION: Primary | ICD-10-CM

## 2017-08-03 DIAGNOSIS — Z12.31 VISIT FOR SCREENING MAMMOGRAM: ICD-10-CM

## 2017-08-03 DIAGNOSIS — C83.30 DLBCL (DIFFUSE LARGE B CELL LYMPHOMA) (H): ICD-10-CM

## 2017-08-03 DIAGNOSIS — Z96.60 S/P JOINT REPLACEMENT: Primary | ICD-10-CM

## 2017-08-03 PROCEDURE — G0009 ADMIN PNEUMOCOCCAL VACCINE: HCPCS

## 2017-08-03 PROCEDURE — 99214 OFFICE O/P EST MOD 30 MIN: CPT | Mod: 25 | Performed by: INTERNAL MEDICINE

## 2017-08-03 PROCEDURE — 90732 PPSV23 VACC 2 YRS+ SUBQ/IM: CPT | Performed by: INTERNAL MEDICINE

## 2017-08-03 RX ORDER — AMOXICILLIN 500 MG/1
CAPSULE ORAL
Qty: 4 CAPSULE | Refills: 3 | Status: SHIPPED | OUTPATIENT
Start: 2017-08-03 | End: 2018-09-05

## 2017-08-03 ASSESSMENT — PAIN SCALES - GENERAL: PAINLEVEL: NO PAIN (0)

## 2017-08-03 NOTE — NURSING NOTE
"Oncology Rooming Note    August 3, 2017 4:01 PM   Randi Garcia is a 71 year old female who presents for:    Chief Complaint   Patient presents with     Oncology Clinic Visit     6 mo f/u     Initial Vitals: There were no vitals taken for this visit. Estimated body mass index is 30.63 kg/(m^2) as calculated from the following:    Height as of 6/24/16: 1.575 m (5' 2.01\").    Weight as of 1/13/17: 76 kg (167 lb 8 oz). There is no height or weight on file to calculate BSA.  Data Unavailable Comment: Data Unavailable   No LMP recorded. Patient is postmenopausal.  Allergies reviewed: Yes  Medications reviewed: Yes    Medications: Medication refills not needed today.  Pharmacy name entered into EPIC:    [facility]  [place] [facility] - [facility], [place] - [address] [place] [address] [facility] [place] [address] [place] [address] [address] [place], [place] - [facility] DIDIER PEGUERO [facility] [address] [place] [address] [place] [address] - [place], [place] - [address] [id number] - [place], [place] - [address] [id number] - [place], [place] - [address]    Clinical concerns:     8 minutes for nursing intake (face to face time)     KATIE ALVARENGA LPN              "

## 2017-08-03 NOTE — Clinical Note
Please cc note to: Primary Care Physician: Dr. Balwinder Hutton, [place], [place]  Rheumatology team: Eliecer Ferrer W

## 2017-08-03 NOTE — MR AVS SNAPSHOT
After Visit Summary   8/3/2017    Randi Garcia    MRN: [id number]           Patient Information     Date Of Birth          1945        Visit Information        Provider Department      8/3/2017 4:00 PM Sue Garcia MD [facility]        Today's Diagnoses     Need for pneumococcal vaccination    -  1    DLBCL (diffuse large B cell lymphoma) (H)        Visit for screening mammogram           Follow-ups after your visit        Your next 10 appointments already scheduled     Feb 08, 2018  1:15 PM CST   LAB with LAB ONC [facility] ([facility])    [address] [place] [address] [id number] [phone]           Patient must bring picture ID. Patient should be prepared to give a urine specimen  Please do not eat 10-12 hours before your appointment if you are coming in fasting for labs on lipids, cholesterol, or glucose (sugar). Pregnant women should follow their Care Team instructions. Water with medications is okay. Do not drink coffee or other fluids. If you have concerns about taking  your medications, please ask at office or if scheduling via Planet D[employer], send a message by clicking on Secure Messaging, Message Your Care Team.            Feb 08, 2018  2:00 PM CST   Return Visit with Sue Garcia MD   [facility])    [address] [place] [address] [id number] [phone]            Aug 03, 2018  9:00 AM CDT   MA SCREENING DIGITAL BILATERAL with MGMA1, MG MA TECH   [facility])    [address] [place] [address] [id number] [phone]           Do not use any powder, lotion or deodorant under your arms or on your breast. If you do, we will ask you to remove it before your exam.  Wear comfortable, two-piece clothing.  If you have any allergies, tell your care team.  Bring any previous mammograms from other facilities  "or have them mailed to the breast center. Three-dimensional (3D) mammograms are available at [place] locations in [facility], [facility], and [place]. [facility] locations include [place] and [facility] in [place]. Benefits of 3D mammograms include: - Improved rate of cancer detection - Decreases your chance of having to go back for more tests, which means fewer: - \"False-positive\" results (This means that there is an abnormal area but it isn't cancer.) - Invasive testing procedures, such as a biopsy or surgery - Can provide clearer images of the breast if you have dense breast tissue. 3D mammography is an optional exam that anyone can have with a 2D mammogram. It doesn't replace or take the place of a 2D mammogram. 2D mammograms remain an effective screening test for all women.  Not all insurance companies cover the cost of a 3D mammogram. Check with your insurance.              Who to contact     If you have questions or need follow up information about today's clinic visit or your schedule please contact [facility] directly at [phone].  Normal or non-critical lab and imaging results will be communicated to you by Get Inhart, letter or phone within 4 business days after the clinic has received the results. If you do not hear from us within 7 days, please contact the clinic through Animal Kingdom[employer] or phone. If you have a critical or abnormal lab result, we will notify you by phone as soon as possible.  Submit refill requests through Miaoyushang or call your pharmacy and they will forward the refill request to us. Please allow 3 business days for your refill to be completed.          Additional Information About Your Visit        Get InharChirpify Information     Mia[employer]ushang gives you secure access to your electronic health record. If you see a primary care provider, you can also send messages to your care team and make appointments. If you have questions, please call " "your primary care clinic.  If you do not have a primary care provider, please call [phone] and they will assist you.      OpenGamma is an electronic gateway that provides easy, online access to your medical records. With OpenGamma, you can request a clinic appointment, read your test results, renew a prescription or communicate with your care team.     To access your existing account, please contact your [facility] or call [phone] for assistance.        Care EveryWhere ID     This is your Care EveryWhere ID. This could be used by other organizations to access your [facility] medical records  [id number]        Your Vitals Were     Pulse Temperature Respirations Height Pulse Oximetry BMI (Body Mass Index)    86 97.4  F (36.3  C) (Oral) 20 1.575 m (5' 2.01\") 97% 32 kg/m2       Blood Pressure from Last 3 Encounters:   08/03/17 111/65   01/13/17 120/67   09/23/16 136/67    Weight from Last 3 Encounters:   08/03/17 79.4 kg (175 lb)   01/13/17 76 kg (167 lb 8 oz)   06/24/16 77.6 kg (171 lb)                 Today's Medication Changes          These changes are accurate as of: 8/3/17 11:59 PM.  If you have any questions, ask your nurse or doctor.               Start taking these medicines.        Dose/Directions    amoxicillin 500 MG capsule   Commonly known as:  AMOXIL   Used for:  S/P joint replacement   Started by:  Brodie Dozier MD        Take 4 capsules (2 grams), by mouth, 1 hour before dental work.   Quantity:  4 capsule   Refills:  3            Where to get your medicines      These medications were sent to [facility] [address] [place] [address] - [address] [facility]  [address] [facility] [address]     Phone:  [phone]     amoxicillin 500 MG capsule                Primary Care Provider Office Phone # Fax #    Randal Franco [phone]       [facility] [address]  [facility] [address]        Equal Access to Services     " BOYD [facility]: Hadii aad ku petty Delatorre, waaxda luqadaha, qaybta kaalmada adedeja, félix kirillin hayaacandy perla cali germaine . So [facility] [phone].    ATENCIÓN: Si habla español, tiene a blanc disposición servicios gratuitos de asistencia lingüística. Llame al [phone].    We comply with applicable federal civil rights laws and [place] laws. We do not discriminate on the basis of race, color, national origin, age, disability sex, sexual orientation or gender identity.            Thank you!     Thank you for choosing [facility]  for your care. Our goal is always to provide you with excellent care. Hearing back from our patients is one way we can continue to improve our services. Please take a few minutes to complete the written survey that you may receive in the mail after your visit with us. Thank you!             Your Updated Medication List - Protect others around you: Learn how to safely use, store and throw away your medicines at www.disposemymeds.org.          This list is accurate as of: 8/3/17 11:59 PM.  Always use your most recent med list.                   Brand Name Dispense Instructions for use Diagnosis    acetaminophen 325 MG tablet    TYLENOL    60 tablet    Take 2 tablets by mouth 3 times daily as needed.    Knee arthroplasty       amoxicillin 500 MG capsule    AMOXIL    4 capsule    Take 4 capsules (2 grams), by mouth, 1 hour before dental work.    S/P joint replacement       aspirin 81 MG tablet      Take 1 tablet by mouth daily.        augmented betamethasone dipropionate 0.05 % cream    DIPROLENE AF    30 g    Apply topically 2 times daily    Psoriasis       Azelaic Acid 15 % gel    FINACEA    150 g    Apply 0.5 inches topically See Admin Instructions massage thin film gently into afected areas morning and evening    Acne rosacea       azelaic acid 20 % cream    AZELEX    30 g    Apply topically 2 times daily    Rosacea       benzonatate 100 MG capsule    TESSALON     42 capsule    Take 1 capsule (100 mg) by mouth 3 times daily as needed    Cough       calcipotriene 0.005 % Oint     120 g    Can use twice daily to affected areas of psoriasis, in between topical steroid uses.    Inverse psoriasis       CALCIUM 600+D PO      Take 1,200 mg by mouth daily.        desonide 0.05 % ointment    DESOWEN    60 g    Apply topically 2 times daily Apply to affected areas of face twice daily until resolved, then taper to once daily for one week.    Dermatitis, seborrheic       hydroxychloroquine 200 MG tablet    PLAQUENIL    180 tablet    Take 2 tablets (400 mg) by mouth daily    Rheumatoid arthritis of multiple sites with negative rheumatoid factor (H), Encounter for long-term current use of medication, Disorder of bone and cartilage, Long-term use of immunosuppressant medication       ketoconazole 2 % cream    NIZORAL    60 g    Apply topically 2 times daily    Tinea cruris       order for DME     1 Package    A pair of foot orthotics.    Rheumatoid arthritis(714.0)       order for DME     2 Units    Equipment being ordered: cranial prosthesis. 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    Alopecia, unspecified, DLBCL (diffuse large B cell lymphoma) (H)       * predniSONE 5 MG tablet    DELTASONE    90 tablet    Take 1 tablet (5 mg) by mouth daily    Rheumatoid arthritis of multiple sites with negative rheumatoid factor (H), Encounter for long-term current use of medication, Disorder of bone and cartilage, Long-term use of immunosuppressant medication       * predniSONE 5 MG tablet    DELTASONE    98 tablet    Take 4 tablets (20 mg) daily for 2 weeks, then take 2 tablets (10 mg) daily for 2 weeks, then back to 5 mg daily.    Flare of rheumatoid arthritis (H), Rheumatoid arthritis of multiple sites with negative rheumatoid factor (H), Long-term use of immunosuppressant medication       triamcinolone 0.1 % ointment    KENALOG    30 g    Apply to affected area twice  daily for two weeks at a time.    Inverse psoriasis       * Notice:  This list has 2 medication(s) that are the same as other medications prescribed for you. Read the directions carefully, and ask your doctor or other care provider to review them with you.

## 2018-01-29 DIAGNOSIS — L71.9 ROSACEA: ICD-10-CM

## 2018-01-29 NOTE — TELEPHONE ENCOUNTER
Last seen:1/13/17  Next appt:none    Patient lives in [place].     Rosacea:  Under good control at present with only remaining telangiectasias and no papular/pustular lesions.  Discussed laser treatments for telangiectasias.  Patient not interested at this time.  Encouraged increasing azelaic acid 20% cream use up to twice daily for control of papular/pustular lesions should they recur.     2. Solar lentigines: Discussed that these are a marker of past sun damage, but are harmless.  Recommended use of a broad spectrum sunscreen of at least SPF 30 on all sun exposed sites daily.  Apply 20 minutes prior to exposure and repeat application every two hours or after sweating or swimming.  Avoid any intentional indoor or outdoor tanning.       3. Cherry angiomas: Reassured of benign nature.  No need for further treatment.     4. Seborrheic keratoses:  Reassured of benign nature.  No need for further treatment.     5. Notalgia paresthetica:  Discussed etiology of back itch in detail.  Recommended use of over the counter Sarna lotion with camphor and methol to be used as needed.     6. Seb derm:  No currently active.  Recommended gentle skin care only at this time.     Follow up in one year for skin check.     Seen and examined with Dr. Sawyer Sanchez

## 2018-01-30 NOTE — TELEPHONE ENCOUNTER
Received a refill request for azelaic acid 20% cream as the resident on call. Patient was last seen 1/13/17 by Dr. Sawyer Sanchez for rosacea. After reviewing the patient's chart and the assessment and plan, refill request was accepted.     Anne Dent MD  PGY-4 Dermatology  Pager: [phone]

## 2018-02-08 ENCOUNTER — ONCOLOGY VISIT (OUTPATIENT)
Dept: ONCOLOGY | Facility: CLINIC | Age: 73
End: 2018-02-08
Payer: COMMERCIAL

## 2018-02-08 VITALS
OXYGEN SATURATION: 99 % | TEMPERATURE: 98.3 F | HEART RATE: 84 BPM | DIASTOLIC BLOOD PRESSURE: 71 MMHG | SYSTOLIC BLOOD PRESSURE: 122 MMHG | RESPIRATION RATE: 15 BRPM | WEIGHT: 173 LBS | HEIGHT: 62 IN | BODY MASS INDEX: 31.83 KG/M2

## 2018-02-08 DIAGNOSIS — C83.30 DLBCL (DIFFUSE LARGE B CELL LYMPHOMA) (H): ICD-10-CM

## 2018-02-08 DIAGNOSIS — Z51.81 ENCOUNTER FOR THERAPEUTIC DRUG MONITORING: ICD-10-CM

## 2018-02-08 DIAGNOSIS — Z79.899 ENCOUNTER FOR LONG-TERM CURRENT USE OF MEDICATION: ICD-10-CM

## 2018-02-08 DIAGNOSIS — C83.30 DIFFUSE LARGE B-CELL LYMPHOMA, UNSPECIFIED BODY REGION (H): Primary | ICD-10-CM

## 2018-02-08 DIAGNOSIS — Z12.11 COLON CANCER SCREENING: ICD-10-CM

## 2018-02-08 LAB
ALBUMIN SERPL-MCNC: 3.6 G/DL (ref 3.4–5)
ALP SERPL-CCNC: 63 U/L (ref 40–150)
ALT SERPL W P-5'-P-CCNC: 35 U/L (ref 0–50)
ANION GAP SERPL CALCULATED.3IONS-SCNC: 6 MMOL/L (ref 3–14)
AST SERPL W P-5'-P-CCNC: 22 U/L (ref 0–45)
BASOPHILS # BLD AUTO: 0 10E9/L (ref 0–0.2)
BASOPHILS NFR BLD AUTO: 0.6 %
BILIRUB SERPL-MCNC: 0.2 MG/DL (ref 0.2–1.3)
BUN SERPL-MCNC: 20 MG/DL (ref 7–30)
CALCIUM SERPL-MCNC: 9 MG/DL (ref 8.5–10.1)
CHLORIDE SERPL-SCNC: 106 MMOL/L (ref 94–109)
CO2 SERPL-SCNC: 28 MMOL/L (ref 20–32)
CREAT SERPL-MCNC: 0.78 MG/DL (ref 0.52–1.04)
DIFFERENTIAL METHOD BLD: ABNORMAL
EOSINOPHIL # BLD AUTO: 0.1 10E9/L (ref 0–0.7)
EOSINOPHIL NFR BLD AUTO: 1.4 %
ERYTHROCYTE [DISTWIDTH] IN BLOOD BY AUTOMATED COUNT: 14.5 % (ref 10–15)
GFR SERPL CREATININE-BSD FRML MDRD: 72 ML/MIN/1.7M2
GLUCOSE SERPL-MCNC: 136 MG/DL (ref 70–99)
HCT VFR BLD AUTO: 40.6 % (ref 35–47)
HGB BLD-MCNC: 12.6 G/DL (ref 11.7–15.7)
IMM GRANULOCYTES # BLD: 0 10E9/L (ref 0–0.4)
IMM GRANULOCYTES NFR BLD: 0.3 %
LDH SERPL L TO P-CCNC: 204 U/L (ref 81–234)
LYMPHOCYTES # BLD AUTO: 0.4 10E9/L (ref 0.8–5.3)
LYMPHOCYTES NFR BLD AUTO: 5.6 %
MCH RBC QN AUTO: 28.1 PG (ref 26.5–33)
MCHC RBC AUTO-ENTMCNC: 31 G/DL (ref 31.5–36.5)
MCV RBC AUTO: 90 FL (ref 78–100)
MONOCYTES # BLD AUTO: 0.5 10E9/L (ref 0–1.3)
MONOCYTES NFR BLD AUTO: 6.4 %
NEUTROPHILS # BLD AUTO: 6.2 10E9/L (ref 1.6–8.3)
NEUTROPHILS NFR BLD AUTO: 85.7 %
PLATELET # BLD AUTO: 196 10E9/L (ref 150–450)
POTASSIUM SERPL-SCNC: 4.4 MMOL/L (ref 3.4–5.3)
PROT SERPL-MCNC: 6.6 G/DL (ref 6.8–8.8)
RBC # BLD AUTO: 4.49 10E12/L (ref 3.8–5.2)
SODIUM SERPL-SCNC: 140 MMOL/L (ref 133–144)
URATE SERPL-MCNC: 4.4 MG/DL (ref 2.6–6)
WBC # BLD AUTO: 7.2 10E9/L (ref 4–11)

## 2018-02-08 PROCEDURE — 83615 LACTATE (LD) (LDH) ENZYME: CPT | Performed by: INTERNAL MEDICINE

## 2018-02-08 PROCEDURE — 36415 COLL VENOUS BLD VENIPUNCTURE: CPT | Performed by: INTERNAL MEDICINE

## 2018-02-08 PROCEDURE — 80053 COMPREHEN METABOLIC PANEL: CPT | Performed by: INTERNAL MEDICINE

## 2018-02-08 PROCEDURE — 84550 ASSAY OF BLOOD/URIC ACID: CPT | Performed by: INTERNAL MEDICINE

## 2018-02-08 PROCEDURE — 85025 COMPLETE CBC W/AUTO DIFF WBC: CPT | Performed by: INTERNAL MEDICINE

## 2018-02-08 PROCEDURE — 99214 OFFICE O/P EST MOD 30 MIN: CPT | Performed by: INTERNAL MEDICINE

## 2018-02-08 ASSESSMENT — PAIN SCALES - GENERAL: PAINLEVEL: NO PAIN (0)

## 2018-02-08 NOTE — LETTER
2/8/2018         RE: Randi Garcia  [address] [facility] [address]        Dear Colleague,    Thank you for referring your patient, Randi Garcia, to the [facility]. Please see a copy of my visit note below.    Oncology Follow-up visit:  Date on this visit: 2/8/2018    Primary Care Physician: Dr. Balwinder Hutton, [place], [place]    Rheumatology team: Dr. Jevon Moreno, [place]      Dx:  Diffuse large B cell lymphoma    Oncologic History:  She woke up on 3/19/2015 with R chest wall bump. CT chest with contrast on 3/31/2015 showed a soft tissue lesion inseparable from the right costosternal junction which measured 6.1x6.6cm. There was no associated mediastinal, hilar or axillary lymphadenopathy. She underwent an US and a biopsy of the chest wall lesion and pathology showed findings c/w Diffuse large B cell lymphoma, germinal center type large B-cell lymphoma. The lymphoma cells were CD20+, BCL-6+, and CD23+. BCL-2 demonstrated only scattered weak equivocal staining. There was no significant staining for CD30, CD10, MUM-1, or CD15 is noted in the cells of interest. Ki-67 demonstrated a high proliferation rate estimated at 90% of the tumor cells. Chromogenic in situ  hybridization for EBV (VÍCTOR) was  negative in the lymphomatous cells.  Bone marrow biopsy performed on 4/8/2015 showed no morphologic evidence of lymphoma. There was no immunophenotypic evidence of lymphoma.  She saw Dr. Benavidez with hematology/oncology in [facility] on 4/8/2015 and was recommended to enroll in a clinical trial, SOWG  where patients with early stage DLBCL are given RCHOPx3 followed by PET ct scan and it the PET is positive, she would get involved field radiation and Zevalin and if the PET is negaitve she would get an additional cycle of RCHOP.   She sought a second opinion with us.   PET/CT of the chest, abdomen and pelvis on 4/10/2015 showed multiple hypermetabolic soft tissue  nodules in the right anterior chest wall as well as a hypermetabolic right supraclavicular lymph node (hypermetabolic, 1.2 x 1.6 cm).  There was a 3.0 x 5.1 cm soft tissue mass at the right second sternocostal junction is hypermetabolic with a maximum SUV of 15.3 and a 0.8 x 2.0 cm soft tissue nodule between the third and fourth ribs anteriorly which was mildly hypermetabolic with a maximum SUV of 6.9. 0.7 x 1.9 and 1.4 x 2.4 cm soft tissue nodules at the fourth sternocostal junction have a maximal  SUV of 12.9 and 7.1, respectively. Additionally, there was a  2 mm right upper lobe pulmonary nodule.    Given extranodal disease, she had an LP performed at [facility] on 4/13/2015. 4cc clear CSF removed. 2 cc sent for flow cytometry. Cytology showed rare lymphocytes that showed smooth nuclear contours. Flow cytometry did not show a monoclonal B cell population.      She proceeded with cycle 1 of R-CHOP on 4/20/2015, with Neulasta support. She has received 3 cycles so far, last on 6/2/2015.  She has tolerated chemotherapy very well and her R chest wall mass has almost entirely disappeared over the course of the first 3-4 days of the first cycle.   She underwent PET/CT scan on 6/20/2015 which showed complete radiographic response to treatment.  She has completed 3000cGY to R anterior CW from 7/2/2015-7/22/2015 under direction of Dr. Foss in [place].        Echocardiogram 4/10/2015:  Left Ventricle EF of 60-65%. RV function normal. Mild left atrial enlargement.      History Of Present Illness:  Ms. Garcia is a 72 year old female who presents for follow-up of treated Diffuse large B cell lymphoma, as above.  She moved to [place] but continues to visit family in [facility] and continues her f/up with us. She has been seeing Dr. Jevon Moreno, in [place], [place]. She is on Plaquenil and prednisone for RA but trying to taper off prednisone. She has received two doses of Rituxan in October, and it helped  with her RA pain. She had a flu shot this past fall. 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  Screening mammogram was negative on 8/2/2017.  She denies fevers, chills, night sweats, weight loss. She is getting over a URI. She has no other health related complaints.  In addition, a complete 12 point  review of systems is negative.    Past Medical/Surgical History:  Past Medical History:   Diagnosis Date     Chronic pain      Psoriasis      Rheumatoid arthritis(714.0)      Steroid long-term use      Past Surgical History:   Procedure Laterality Date     ARTHROPLASTY KNEE  9/20/2011    Procedure:ARTHROPLASTY KNEE; Left Total Knee Replacement ; Surgeon:BLAYNE GAYTAN; Location:[facility]     ARTHROSCOPY KNEE BILATERAL       EXTRACAPSULAR CATARACT EXTRATION WITH INTRAOCULAR LENS IMPLANT      right eye 1/2011     PHACOEMULSIFICATION CLEAR CORNEA WITH STANDARD INTRAOCULAR LENS IMPLANT  4/16/2013    Procedure: PHACOEMULSIFICATION CLEAR CORNEA WITH STANDARD INTRAOCULAR LENS IMPLANT;  LEFT EYE PHACOEMULSIFICATION CLEAR CORNEA WITH STANDARD INTRAOCULAR LENS IMPLANT ;  Surgeon: Tawanda Sanchez MD;  Location: [facility]     SURGICAL HISTORY OF -   1/19/2011    Cataract, right eye     SYNOVECTOMY HIP      not hip/ bilateral hands   She saw Dr. Sanchez in 2015. and had a L groin lesion biopsied and it was c/w psoriasis on Bx and she will be following up with him.   As far as RA, she was diagnosed in 2000, had focally erosive disease and has been on Enbrel and Remicade in the past. Prior to Dx of DLBCL she was on Humira and MTX.   FHx and Social Hx reviewed.    Allergies:  Allergies as of 02/08/2018 - Jagdeep as Reviewed 08/07/2017   Allergen Reaction Noted     Codeine Nausea and Vomiting 01/31/2006     Ibuprofen Itching 07/06/2005     Pneumovax [pneumococcal polysaccharides] Other (See Comments) 08/07/2017     Sulfa drugs Hives 01/31/2006     Current  Medications:  Current Outpatient Prescriptions   Medication Sig Dispense Refill     azelaic acid (AZELEX) 20 % cream Apply topically 2 times daily 30 g 11     amoxicillin (AMOXIL) 500 MG capsule Take 4 capsules (2 grams), by mouth, 1 hour before dental work. 4 capsule 3     hydroxychloroquine (PLAQUENIL) 200 MG tablet Take 2 tablets (400 mg) by mouth daily 180 tablet 0     predniSONE (DELTASONE) 5 MG tablet Take 4 tablets (20 mg) daily for 2 weeks, then take 2 tablets (10 mg) daily for 2 weeks, then back to 5 mg daily. 98 tablet 0     predniSONE (DELTASONE) 5 MG tablet Take 1 tablet (5 mg) by mouth daily 90 tablet 0     augmented betamethasone dipropionate (DIPROLENE AF) 0.05 % cream Apply topically 2 times daily 30 g 0     calcipotriene 0.005 % OINT Can use twice daily to affected areas of psoriasis, in between topical steroid uses. 120 g 1     triamcinolone (KENALOG) 0.1 % ointment Apply to affected area twice daily for two weeks at a time. 30 g 2     desonide (DESOWEN) 0.05 % ointment Apply topically 2 times daily Apply to affected areas of face twice daily until resolved, then taper to once daily for one week. 60 g 1     ketoconazole (NIZORAL) 2 % cream Apply topically 2 times daily 60 g 1     ORDER FOR DME Equipment being ordered: cranial prosthesis.  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 2 Units 0     benzonatate (TESSALON) 100 MG capsule Take 1 capsule (100 mg) by mouth 3 times daily as needed 42 capsule 1     Azelaic Acid (FINACEA) 15 % gel Apply 0.5 inches topically See Admin Instructions massage thin film gently into afected areas morning and evening 150 g 3     Calcium Carbonate-Vitamin D (CALCIUM 600+D PO) Take 1,200 mg by mouth daily.       aspirin 81 MG tablet Take 1 tablet by mouth daily.       ORDER FOR DME A pair of foot orthotics. 1 Package 0     acetaminophen (TYLENOL) 325 MG tablet Take 2 tablets by mouth 3 times daily as needed. 60 tablet 0        Physical  "Exam:    /71  Pulse 84  Temp 98.3  F (36.8  C)  Resp 15  Ht 1.575 m (5' 2.01\")  Wt 78.5 kg (173 lb)  SpO2 99%  BMI 31.63 kg/m2    GENERAL APPEARANCE: healthy, alert and no distress     HENT: Mouth without ulcers or lesions. No throat erythema.     NECK: no adenopathy, no asymmetry or masses     LYMPHATICS: No cervical, supraclavicular, axillary lymphadenopathy b/l     RESP: lungs clear to auscultation - no rales, rhonchi or wheezes     CARDIOVASCULAR: regular rates and rhythm, normal S1 S2, no S3 or S4 and no murmur.     ABDOMEN:  soft, nontender, no HSM or masses and bowel sounds normal     MUSCULOSKELETAL: extremities normal- no gross deformities noted, no evidence of inflammation in joints, FROM in all extremities. No edema b/l LE.  Chest: No right chest wall masses. Incision healed well.      SKIN: no suspicious lesions or rashes     PSYCHIATRIC: mentation appears normal and affect normal  Breast: No axillary lymphadenopathy b/l.    Laboratory/Imaging Studies    Labs from [place], [place] also reviewed (patient brought with her for this visit)    Component      Latest Ref Rng & Units 2/8/2018   WBC      4.0 - 11.0 10e9/L 7.2   RBC Count      3.8 - 5.2 10e12/L 4.49   Hemoglobin      11.7 - 15.7 g/dL 12.6   Hematocrit      35.0 - 47.0 % 40.6   MCV      78 - 100 fl 90   MCH      26.5 - 33.0 pg 28.1   MCHC      31.5 - 36.5 g/dL 31.0 (L)   RDW      10.0 - 15.0 % 14.5   Platelet Count      150 - 450 10e9/L 196   Diff Method       Automated Method   % Neutrophils      % 85.7   % Lymphocytes      % 5.6   % Monocytes      % 6.4   % Eosinophils      % 1.4   % Basophils      % 0.6   % Immature Granulocytes      % 0.3   Absolute Neutrophil      1.6 - 8.3 10e9/L 6.2   Absolute Lymphocytes      0.8 - 5.3 10e9/L 0.4 (L)   Absolute Monocytes      0.0 - 1.3 10e9/L 0.5   Absolute Eosinophils      0.0 - 0.7 10e9/L 0.1   Absolute Basophils      0.0 - 0.2 10e9/L 0.0   Abs Immature Granulocytes      0 - 0.4 10e9/L 0.0 "   Sodium      133 - 144 mmol/L 140   Potassium      3.4 - 5.3 mmol/L 4.4   Chloride      94 - 109 mmol/L 106   Carbon Dioxide      20 - 32 mmol/L 28   Anion Gap      3 - 14 mmol/L 6   Glucose      70 - 99 mg/dL 136 (H)   Urea Nitrogen      7 - 30 mg/dL 20   Creatinine      0.52 - 1.04 mg/dL 0.78   GFR Estimate      >60 mL/min/1.7m2 72   GFR Estimate If Black      >60 mL/min/1.7m2 87   Calcium      8.5 - 10.1 mg/dL 9.0   Bilirubin Total      0.2 - 1.3 mg/dL 0.2   Albumin      3.4 - 5.0 g/dL 3.6   Protein Total      6.8 - 8.8 g/dL 6.6 (L)   Alkaline Phosphatase      40 - 150 U/L 63   ALT      0 - 50 U/L 35   AST      0 - 45 U/L 22   Lactate Dehydrogenase      81 - 234 U/L 204   Uric Acid      2.6 - 6.0 mg/dL 4.4     ASSESSMENT/PLAN:  The patient is a very pleasant 72 year old woman with stage IIAE Diffuse large B cell lymphoma involving right anterior chest wall, right supraclavicular LN and eroding into sternum. She falls in the low-intermediate risk IPI group when not adjusted for age, with IPI score of 2 which with associated with 67% CR rate with treatment and 51% 5 year overall survival rate. If adjusted for age, she would fall into low risk or IPI of 0 score, associated with CR rate of 91% and 56% 5 year overall survival.  She has had an excellent clinical response after one cycle of RCHOP and is in complete radiographic CR after 3 cycles of  R-CHOP, last on 6/2/2015. She has completed 3000cGY to R anterior CW from 7/2/2015-7/22/2015 under direction of Dr. Foss in [place].  She had no e/o disease recurrence on her last CT chest form 6/2016.    1. Diffuse large B cell lymphoma -  She has no e/o disease recurrence clinically.  Per NCCN guidelines (V2.2015) f/up of stage II Diffuse large B cell lymphoma includes H&P and labs every 3-6 months for 5 years, then annually and imaging as clinically indicated.  F/up in 6 months with labs. Patient periodically returns to [place] and prefers to continue her f/up with us.  She will see us in September- she will be here for her brother's birthday.    2. Breast Cancer Screening - b/l screening mammogram negative 8/1/2017. F/up in one year, due with next visit.  3. Colon Cancer Screening - colonoscopy on 4/16/2015. The exam was normal and repeat was recommended in 5 years for screening purposes.    4. RA - currently off MTX.  She is on plaquenil and  Prednisone. She is s/p Rituxan weekly x 2 in October 2017 and is trying to taper off Prednisone. F/up with Dr. Montilla in  [place].     5. Social - She lives in [place] now with her daughter's family but comes back to [place] to visit family here to and prefers to f/up with us when she is here.  6. Psoriasis- f/up with dermatology. She saw Dr. Sanchez in  in the past.    At the end of our visit patient verbalized understanding and concurred with the plan.                Again, thank you for allowing me to participate in the care of your patient.        Sincerely,        Sue Garcia MD, MD

## 2018-02-08 NOTE — MR AVS SNAPSHOT
"              After Visit Summary   2/8/2018    Randi Garcia    MRN: [id number]           Patient Information     Date Of Birth          1945        Visit Information        Provider Department      2/8/2018 2:00 PM Sue Garcia MD [facility]        Today's Diagnoses     Diffuse large B-cell lymphoma, unspecified body region (H)    -  1       Follow-ups after your visit        Your next 10 appointments already scheduled     Sep 12, 2018  9:00 AM CDT   (Arrive by 8:45 AM)   MA SCREENING DIGITAL BILATERAL with MGMA1, MG MA TECH   [facility] ([facility])    [address] [place] [address] [phone]           Do not use any powder, lotion or deodorant under your arms or on your breast. If you do, we will ask you to remove it before your exam.  Wear comfortable, two-piece clothing.  If you have any allergies, tell your care team.  Bring any previous mammograms from other facilities or have them mailed to the breast center. Three-dimensional (3D) mammograms are available at [place] locations in [facility], [facility], [facility], [place], [place], and [place]. [facility] locations include [place] [facility] in [place]. Benefits of 3D mammograms include: - Improved rate of cancer detection - Decreases your chance of having to go back for more tests, which means fewer: - \"False-positive\" results (This means that there is an abnormal area but it isn't cancer.) - Invasive testing procedures, such as a biopsy or surgery - Can provide clearer images of the breast if you have dense breast tissue. 3D mammography is an optional exam that anyone can have with a 2D mammogram. It doesn't replace or take the place of a 2D mammogram. 2D mammograms remain an effective screening test for all women.  Not all insurance companies cover the cost of a 3D mammogram. Check with your insurance.            Sep " 14, 2018  2:15 PM CDT   LAB with LAB ONC [facility] ([facility])    [address] [address] [place] [id number] [phone]           Please do not eat 10-12 hours before your appointment if you are coming in fasting for labs on lipids, cholesterol, or glucose (sugar). This does not apply to pregnant women. Water, hot tea and black coffee (with nothing added) are okay. Do not drink other fluids, diet soda or chew gum.            Sep 14, 2018  3:00 PM CDT   Return Visit with Sue Garcia MD   [facility]    [address] [facility] [address] [phone]              Who to contact     If you have questions or need follow up information about today's clinic visit or your schedule please contact [facility] directly at [phone].  Normal or non-critical lab and imaging results will be communicated to you by ADmantXt, letter or phone within 4 business days after the clinic has received the results. If you do not hear from us within 7 days, please contact the clinic through ADmantXt or phone. If you have a critical or abnormal lab result, we will notify you by phone as soon as possible.  Submit refill requests through [employer] or call your pharmacy and they will forward the refill request to us. Please allow 3 business days for your refill to be completed.          Additional Information About Your Visit        [employer] Information     [employer] gives you secure access to your electronic health record. If you see a primary care provider, you can also send messages to your care team and make appointments. If you have questions, please call your primary care clinic.  If you do not have a primary care provider, please call [phone] and they will assist you.      [employer] is an electronic gateway that provides easy, online access to your medical records. With [employer], you can request a clinic  "appointment, read your test results, renew a prescription or communicate with your care team.     To access your existing account, please contact your [facility] or call [phone] for assistance.        Care EveryWhere ID     This is your Care EveryWhere ID. This could be used by other organizations to access your [place] medical records  [id number]        Your Vitals Were     Pulse Temperature Respirations Height Pulse Oximetry BMI (Body Mass Index)    84 98.3  F (36.8  C) 15 1.575 m (5' 2.01\") 99% 31.63 kg/m2       Blood Pressure from Last 3 Encounters:   02/08/18 122/71   08/03/17 111/65   01/13/17 120/67    Weight from Last 3 Encounters:   02/08/18 78.5 kg (173 lb)   08/03/17 79.4 kg (175 lb)   01/13/17 76 kg (167 lb 8 oz)              Today, you had the following     No orders found for display       Primary Care Provider Office Phone # Fax #    Randal NASIM Franco [phone]       [facility] [address] [facility] [address]  [facility] [address]        Equal Access to Services     YVAN SAMAYOA : Hadii luca ku hadasho Sotila, waaxda luqadaha, qaybta kaalmada adedeja, félix herron . So [facility] [phone].    ATENCIÓN: Si habla español, tiene a blanc disposición servicios gratuitos de asistencia lingüística. Beverly al [phone].    We comply with applicable federal civil rights laws and [place] laws. We do not discriminate on the basis of race, color, national origin, age, disability, sex, sexual orientation, or gender identity.            Thank you!     Thank you for choosing [facility]  for your care. Our goal is always to provide you with excellent care. Hearing back from our patients is one way we can continue to improve our services. Please take a few minutes to complete the written survey that you may receive in the mail after your visit with us. Thank you!             Your Updated Medication List - Protect others " around you: Learn how to safely use, store and throw away your medicines at www.disposemymeds.org.          This list is accurate as of 2/8/18  2:42 PM.  Always use your most recent med list.                   Brand Name Dispense Instructions for use Diagnosis    acetaminophen 325 MG tablet    TYLENOL    60 tablet    Take 2 tablets by mouth 3 times daily as needed.    Knee arthroplasty       amoxicillin 500 MG capsule    AMOXIL    4 capsule    Take 4 capsules (2 grams), by mouth, 1 hour before dental work.    S/P joint replacement       aspirin 81 MG tablet      Take 1 tablet by mouth daily.        augmented betamethasone dipropionate 0.05 % cream    DIPROLENE AF    30 g    Apply topically 2 times daily    Psoriasis       Azelaic Acid 15 % gel    FINACEA    150 g    Apply 0.5 inches topically See Admin Instructions massage thin film gently into afected areas morning and evening    Acne rosacea       azelaic acid 20 % cream    AZELEX    30 g    Apply topically 2 times daily    Rosacea       benzonatate 100 MG capsule    TESSALON    42 capsule    Take 1 capsule (100 mg) by mouth 3 times daily as needed    Cough       calcipotriene 0.005 % Oint     120 g    Can use twice daily to affected areas of psoriasis, in between topical steroid uses.    Inverse psoriasis       CALCIUM 600+D PO      Take 1,200 mg by mouth daily.        desonide 0.05 % ointment    DESOWEN    60 g    Apply topically 2 times daily Apply to affected areas of face twice daily until resolved, then taper to once daily for one week.    Dermatitis, seborrheic       hydroxychloroquine 200 MG tablet    PLAQUENIL    180 tablet    Take 2 tablets (400 mg) by mouth daily    Rheumatoid arthritis of multiple sites with negative rheumatoid factor (H), Encounter for long-term current use of medication, Disorder of bone and cartilage, Long-term use of immunosuppressant medication       ketoconazole 2 % cream    NIZORAL    60 g    Apply topically 2 times daily     Tinea cruris       order for DME     1 Package    A pair of foot orthotics.    Rheumatoid arthritis(714.0)       order for DME     2 Units    Equipment being ordered: cranial prosthesis. 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    Alopecia, unspecified, DLBCL (diffuse large B cell lymphoma) (H)       * predniSONE 5 MG tablet    DELTASONE    90 tablet    Take 1 tablet (5 mg) by mouth daily    Rheumatoid arthritis of multiple sites with negative rheumatoid factor (H), Encounter for long-term current use of medication, Disorder of bone and cartilage, Long-term use of immunosuppressant medication       * predniSONE 5 MG tablet    DELTASONE    98 tablet    Take 4 tablets (20 mg) daily for 2 weeks, then take 2 tablets (10 mg) daily for 2 weeks, then back to 5 mg daily.    Flare of rheumatoid arthritis (H), Rheumatoid arthritis of multiple sites with negative rheumatoid factor (H), Long-term use of immunosuppressant medication       triamcinolone 0.1 % ointment    KENALOG    30 g    Apply to affected area twice daily for two weeks at a time.    Inverse psoriasis       * Notice:  This list has 2 medication(s) that are the same as other medications prescribed for you. Read the directions carefully, and ask your doctor or other care provider to review them with you.

## 2018-02-08 NOTE — NURSING NOTE
"Oncology Rooming Note    February 8, 2018 2:09 PM   Randi Garcia is a 72 year old female who presents for:    Chief Complaint   Patient presents with     Oncology Clinic Visit     follow up      Initial Vitals: /71  Pulse 84  Temp 98.3  F (36.8  C)  Resp 15  Ht 1.575 m (5' 2.01\")  Wt 78.5 kg (173 lb)  SpO2 99%  BMI 31.63 kg/m2 Estimated body mass index is 31.63 kg/(m^2) as calculated from the following:    Height as of this encounter: 1.575 m (5' 2.01\").    Weight as of this encounter: 78.5 kg (173 lb). Body surface area is 1.85 meters squared.  No Pain (0) Comment: Data Unavailable   No LMP recorded. Patient is postmenopausal.  Allergies reviewed: Yes  Medications reviewed: Yes    Medications: Medication refills not needed today.  Pharmacy name entered into EPIC:    [facility]  [place] [facility] - [facility], [place] - [address] [place] [address] [facility]  [facility] [place] [facility] [address] [facility] [address] [place] [place] [facility] [address] [address] [place] [address] [place] [address] - [place], [place] - [address] [facility] [id number] - [place], [place] - [address] [facility] [id number] - [place], [place] - [address]      5 minutes for nursing intake (face to face time)     Svetlana Rodríguez LPN              "

## 2018-07-17 ENCOUNTER — TRANSFERRED RECORDS (OUTPATIENT)
Dept: HEALTH INFORMATION MANAGEMENT | Facility: CLINIC | Age: 73
End: 2018-07-17

## 2018-07-17 LAB
ALT SERPL-CCNC: 15 U/L (ref 5–40)
AST SERPL-CCNC: 19 U/L (ref 9–40)
CREAT SERPL-MCNC: 0.72 MG/DL (ref 0.4–1.4)
GFR SERPL CREATININE-BSD FRML MDRD: 82 ML/MIN/1.73M2

## 2018-09-05 ENCOUNTER — TELEPHONE (OUTPATIENT)
Dept: ORTHOPEDICS | Facility: CLINIC | Age: 73
End: 2018-09-05

## 2018-09-05 DIAGNOSIS — Z96.60 S/P JOINT REPLACEMENT: ICD-10-CM

## 2018-09-05 RX ORDER — AMOXICILLIN 500 MG/1
CAPSULE ORAL
Qty: 4 CAPSULE | Refills: 3 | Status: SHIPPED | OUTPATIENT
Start: 2018-09-05 | End: 2019-03-18

## 2018-09-05 NOTE — TELEPHONE ENCOUNTER
Pt was called back re: med refill for amoxicillin was sent to [facility] pharmacy per the patient's request, VM was left explaining.    Chito May

## 2018-09-05 NOTE — TELEPHONE ENCOUNTER
[facility] Call Center    Phone Message    May a detailed message be left on voicemail: yes    Reason for Call: Medication Refill Request    Has the patient contacted the pharmacy for the refill? Yes   Name of medication being requested: Antibiotics  Provider who prescribed the medication: Dr. Dozier   Pharmacy: [facility] in [place]   Date medication is needed: 09/10/2018         Action Taken: Message routed to:  Other: Ortho

## 2018-09-12 ENCOUNTER — RADIANT APPOINTMENT (OUTPATIENT)
Dept: MAMMOGRAPHY | Facility: CLINIC | Age: 73
End: 2018-09-12
Attending: INTERNAL MEDICINE
Payer: COMMERCIAL

## 2018-09-12 DIAGNOSIS — Z12.31 VISIT FOR SCREENING MAMMOGRAM: ICD-10-CM

## 2018-09-12 DIAGNOSIS — Z23 NEED FOR PNEUMOCOCCAL VACCINATION: ICD-10-CM

## 2018-09-12 DIAGNOSIS — C83.30 DLBCL (DIFFUSE LARGE B CELL LYMPHOMA) (H): ICD-10-CM

## 2018-09-12 PROCEDURE — 77067 SCR MAMMO BI INCL CAD: CPT

## 2018-09-12 PROCEDURE — 77063 BREAST TOMOSYNTHESIS BI: CPT

## 2018-09-14 ENCOUNTER — ONCOLOGY VISIT (OUTPATIENT)
Dept: ONCOLOGY | Facility: CLINIC | Age: 73
End: 2018-09-14
Payer: COMMERCIAL

## 2018-09-14 VITALS
HEIGHT: 62 IN | TEMPERATURE: 98.5 F | SYSTOLIC BLOOD PRESSURE: 106 MMHG | BODY MASS INDEX: 29.26 KG/M2 | DIASTOLIC BLOOD PRESSURE: 78 MMHG | HEART RATE: 66 BPM | WEIGHT: 159 LBS | RESPIRATION RATE: 18 BRPM | OXYGEN SATURATION: 95 %

## 2018-09-14 DIAGNOSIS — Z12.11 COLON CANCER SCREENING: ICD-10-CM

## 2018-09-14 DIAGNOSIS — Z51.81 ENCOUNTER FOR THERAPEUTIC DRUG MONITORING: ICD-10-CM

## 2018-09-14 DIAGNOSIS — R63.4 LOSS OF WEIGHT: ICD-10-CM

## 2018-09-14 DIAGNOSIS — Z79.899 ENCOUNTER FOR LONG-TERM CURRENT USE OF MEDICATION: ICD-10-CM

## 2018-09-14 DIAGNOSIS — C83.30 DLBCL (DIFFUSE LARGE B CELL LYMPHOMA) (H): ICD-10-CM

## 2018-09-14 DIAGNOSIS — C83.30 DIFFUSE LARGE B-CELL LYMPHOMA, UNSPECIFIED BODY REGION (H): Primary | ICD-10-CM

## 2018-09-14 LAB
ALBUMIN SERPL-MCNC: 3.1 G/DL (ref 3.4–5)
ALP SERPL-CCNC: 69 U/L (ref 40–150)
ALT SERPL W P-5'-P-CCNC: 25 U/L (ref 0–50)
ANION GAP SERPL CALCULATED.3IONS-SCNC: 8 MMOL/L (ref 3–14)
AST SERPL W P-5'-P-CCNC: 16 U/L (ref 0–45)
BASOPHILS # BLD AUTO: 0 10E9/L (ref 0–0.2)
BASOPHILS NFR BLD AUTO: 0.4 %
BILIRUB SERPL-MCNC: 0.2 MG/DL (ref 0.2–1.3)
BUN SERPL-MCNC: 26 MG/DL (ref 7–30)
CALCIUM SERPL-MCNC: 9.2 MG/DL (ref 8.5–10.1)
CHLORIDE SERPL-SCNC: 106 MMOL/L (ref 94–109)
CO2 SERPL-SCNC: 28 MMOL/L (ref 20–32)
CREAT SERPL-MCNC: 0.88 MG/DL (ref 0.52–1.04)
DIFFERENTIAL METHOD BLD: ABNORMAL
EOSINOPHIL # BLD AUTO: 0 10E9/L (ref 0–0.7)
EOSINOPHIL NFR BLD AUTO: 0.4 %
ERYTHROCYTE [DISTWIDTH] IN BLOOD BY AUTOMATED COUNT: 15.2 % (ref 10–15)
FERRITIN SERPL-MCNC: 163 NG/ML (ref 8–252)
FOLATE SERPL-MCNC: 24.4 NG/ML
GFR SERPL CREATININE-BSD FRML MDRD: 63 ML/MIN/1.7M2
GLUCOSE SERPL-MCNC: 153 MG/DL (ref 70–99)
HCT VFR BLD AUTO: 35.7 % (ref 35–47)
HGB BLD-MCNC: 10.9 G/DL (ref 11.7–15.7)
IMM GRANULOCYTES # BLD: 0.1 10E9/L (ref 0–0.4)
IMM GRANULOCYTES NFR BLD: 0.5 %
IRON SATN MFR SERPL: 7 % (ref 15–46)
IRON SERPL-MCNC: 18 UG/DL (ref 35–180)
LDH SERPL L TO P-CCNC: 183 U/L (ref 81–234)
LYMPHOCYTES # BLD AUTO: 0.4 10E9/L (ref 0.8–5.3)
LYMPHOCYTES NFR BLD AUTO: 4.5 %
MCH RBC QN AUTO: 26.1 PG (ref 26.5–33)
MCHC RBC AUTO-ENTMCNC: 30.5 G/DL (ref 31.5–36.5)
MCV RBC AUTO: 86 FL (ref 78–100)
MONOCYTES # BLD AUTO: 0.4 10E9/L (ref 0–1.3)
MONOCYTES NFR BLD AUTO: 4.4 %
NEUTROPHILS # BLD AUTO: 8.8 10E9/L (ref 1.6–8.3)
NEUTROPHILS NFR BLD AUTO: 89.8 %
PLATELET # BLD AUTO: 301 10E9/L (ref 150–450)
POTASSIUM SERPL-SCNC: 4.3 MMOL/L (ref 3.4–5.3)
PROT SERPL-MCNC: 6.5 G/DL (ref 6.8–8.8)
RBC # BLD AUTO: 4.17 10E12/L (ref 3.8–5.2)
RETICS # AUTO: 67.4 10E9/L (ref 25–95)
RETICS/RBC NFR AUTO: 1.6 % (ref 0.5–2)
SODIUM SERPL-SCNC: 142 MMOL/L (ref 133–144)
TIBC SERPL-MCNC: 270 UG/DL (ref 240–430)
TSH SERPL DL<=0.005 MIU/L-ACNC: 0.66 MU/L (ref 0.4–4)
URATE SERPL-MCNC: 4 MG/DL (ref 2.6–6)
VIT B12 SERPL-MCNC: 787 PG/ML (ref 193–986)
WBC # BLD AUTO: 9.8 10E9/L (ref 4–11)

## 2018-09-14 PROCEDURE — 84443 ASSAY THYROID STIM HORMONE: CPT | Performed by: INTERNAL MEDICINE

## 2018-09-14 PROCEDURE — 85045 AUTOMATED RETICULOCYTE COUNT: CPT | Performed by: INTERNAL MEDICINE

## 2018-09-14 PROCEDURE — 82746 ASSAY OF FOLIC ACID SERUM: CPT | Performed by: INTERNAL MEDICINE

## 2018-09-14 PROCEDURE — 83615 LACTATE (LD) (LDH) ENZYME: CPT | Performed by: INTERNAL MEDICINE

## 2018-09-14 PROCEDURE — 99215 OFFICE O/P EST HI 40 MIN: CPT | Performed by: INTERNAL MEDICINE

## 2018-09-14 PROCEDURE — 83540 ASSAY OF IRON: CPT | Performed by: INTERNAL MEDICINE

## 2018-09-14 PROCEDURE — 82728 ASSAY OF FERRITIN: CPT | Performed by: INTERNAL MEDICINE

## 2018-09-14 PROCEDURE — 40000611 ZZHCL STATISTIC MORPHOLOGY W/INTERP HEMEPATH TC 85060: Performed by: INTERNAL MEDICINE

## 2018-09-14 PROCEDURE — 36415 COLL VENOUS BLD VENIPUNCTURE: CPT | Performed by: INTERNAL MEDICINE

## 2018-09-14 PROCEDURE — 85025 COMPLETE CBC W/AUTO DIFF WBC: CPT | Performed by: INTERNAL MEDICINE

## 2018-09-14 PROCEDURE — 83550 IRON BINDING TEST: CPT | Performed by: INTERNAL MEDICINE

## 2018-09-14 PROCEDURE — 84550 ASSAY OF BLOOD/URIC ACID: CPT | Performed by: INTERNAL MEDICINE

## 2018-09-14 PROCEDURE — 82607 VITAMIN B-12: CPT | Performed by: INTERNAL MEDICINE

## 2018-09-14 PROCEDURE — 80053 COMPREHEN METABOLIC PANEL: CPT | Performed by: INTERNAL MEDICINE

## 2018-09-14 RX ORDER — ALBUTEROL SULFATE 90 UG/1
1 AEROSOL, METERED RESPIRATORY (INHALATION) 3 TIMES DAILY PRN
COMMUNITY

## 2018-09-14 ASSESSMENT — PAIN SCALES - GENERAL: PAINLEVEL: NO PAIN (0)

## 2018-09-14 NOTE — NURSING NOTE
"Oncology Rooming Note    September 14, 2018 3:10 PM   Randi Garcia is a 72 year old female who presents for:    Chief Complaint   Patient presents with     Oncology Clinic Visit     6 month follow up     Initial Vitals: /78  Pulse 66  Temp 98.5  F (36.9  C)  Resp 18  Ht 1.575 m (5' 2.01\")  Wt 72.1 kg (159 lb)  SpO2 95%  BMI 29.07 kg/m2 Estimated body mass index is 29.07 kg/(m^2) as calculated from the following:    Height as of this encounter: 1.575 m (5' 2.01\").    Weight as of this encounter: 72.1 kg (159 lb). Body surface area is 1.78 meters squared.  No Pain (0) Comment: Data Unavailable   No LMP recorded. Patient is postmenopausal.  Allergies reviewed: Yes  Medications reviewed: Yes    Medications: Medication refills not needed today.  Pharmacy name entered into EPIC:    [facility]  [facility] - [place], [place] - [address] [place] [address] [facility] [place], [place] [address] [facility] - [place], [place] [address] [facility] [place], [place] [address] [place] [address] - [place], [place] - [address] [id number] - [place], [place] - [address] [facility]  - [place], [place] - [address]        5 minutes for nursing intake (face to face time)     Jacy Hurtado LPN              "

## 2018-09-14 NOTE — MR AVS SNAPSHOT
After Visit Summary   9/14/2018    Randi Garcia    MRN: [id number]           Patient Information     Date Of Birth          1945        Visit Information        Provider Department      9/14/2018 3:00 PM Sue Garcia MD [facility]        Today's Diagnoses     Diffuse large B-cell lymphoma, unspecified body region (H)    -  1    Loss of weight           Follow-ups after your visit        Your next 10 appointments already scheduled     Sep 17, 2018  8:00 AM CDT   CT CHEST ABDOMEN PELVIS W/O & W CONTRAST with BECT1   [facility]    [address] [facility] [address]   [phone]           How do I prepare for my exam? (Food and drink instructions) To prepare: Do not eat or drink for 2 hours before your exam. If you need to take medicine, you may take it with small sips of water. (We may ask you to take liquid medicine as well.)  How do I prepare for my exam? (Other instructions) Please arrive 30 minutes early for your CT.  Once in the department you might be asked to drink water 15-20 minutes prior to your exam.  If indicated you may be asked to drink an oral contrast in advance of your CT.  If this is the case, the imaging team will let you know or be in contact with you prior to your appointment  Patients over 70 or patients with diabetes or kidney problems: If you haven t had a blood test (creatinine test) within the last 30 days, the Cardiologist/Radiologist may require you to get this test prior to your exam.  If you have diabetes:  Continue to take your metformin medication on the day of your exam  What should I wear: Please wear loose clothing, such as a sweat suit or jogging clothes. Avoid snaps, zippers and other metal. We may ask you to undress and put on a hospital gown.  How long does the exam take: Most scans take less than 20 minutes.  What should I bring: Please bring any scans or X-rays taken at other  [facility], if similar tests were done. Also bring a list of your medicines, including vitamins, minerals and over-the-counter drugs. It is safest to leave personal items at home.  Do I need a : No  is needed.  What do I need to tell my doctor? Be sure to tell your doctor: * If you have any allergies. * If there s any chance you are pregnant. * If you are breastfeeding.  What should I do after the exam: No restrictions, You may resume normal activities.  What is this test: A CT (computed tomography) scan is a series of pictures that allows us to look inside your body. The scanner creates images of the body in cross sections, much like slices of bread. This helps us see any problems more clearly. You may receive contrast (X-ray dye) before or during your scan. You will be asked to drink the contrast.  Who should I call with questions: If you have any questions, please call the Imaging Department where you will have your exam. Directions, parking instructions, and other information is available on our website, [email or web address].              Future tests that were ordered for you today     Open Future Orders        Priority Expected Expires Ordered    CT Chest abdomen pelvis w & w/o contrast Routine  9/14/2019 9/14/2018            Who to contact     If you have questions or need follow up information about today's clinic visit or your schedule please contact [facility] directly at [phone].  Normal or non-critical lab and imaging results will be communicated to you by MyChart, letter or phone within 4 business days after the clinic has received the results. If you do not hear from us within 7 days, please contact the clinic through MyChart or phone. If you have a critical or abnormal lab result, we will notify you by phone as soon as possible.  Submit refill requests through [employer] or call your pharmacy and they will forward the refill request to us. Please allow 3 business days  "for your refill to be completed.          Additional Information About Your Visit        [employer] Information     [employer] gives you secure access to your electronic health record. If you see a primary care provider, you can also send messages to your care team and make appointments. If you have questions, please call your primary care clinic.  If you do not have a primary care provider, please call [phone] and they will assist you.      [employer] is an electronic gateway that provides easy, online access to your medical records. With [employer], you can request a clinic appointment, read your test results, renew a prescription or communicate with your care team.     To access your existing account, please contact your [facility] or call [phone] for assistance.        Care EveryWhere ID     This is your Care EveryWhere ID. This could be used by other organizations to access your [facility] medical records  [id number]        Your Vitals Were     Pulse Temperature Respirations Height Pulse Oximetry BMI (Body Mass Index)    66 98.5  F (36.9  C) 18 1.575 m (5' 2.01\") 95% 29.07 kg/m2       Blood Pressure from Last 3 Encounters:   09/14/18 106/78   02/08/18 122/71   08/03/17 111/65    Weight from Last 3 Encounters:   09/14/18 72.1 kg (159 lb)   02/08/18 78.5 kg (173 lb)   08/03/17 79.4 kg (175 lb)              We Performed the Following     Bld morphology pathology review     Ferritin     Folate     Iron and iron binding capacity     TSH     Vitamin B12        Primary Care Provider Office Phone # Fax #    [place] NASIM Franco [phone]       [place] [address] [facility] [address]  [facility] [address]        Equal Access to Services     BOYD SAMAYOA : Cam Delatorre, xavier quispe, félix velazquez. So [facility] [phone].    ATENCIÓN: Si habla español, tiene a blanc disposición servicios gratuitos de asistencia " lingüísticaMadhu Paul al [phone].    We comply with applicable federal civil rights laws and [place] laws. We do not discriminate on the basis of race, color, national origin, age, disability, sex, sexual orientation, or gender identity.            Thank you!     Thank you for choosing [facility]  for your care. Our goal is always to provide you with excellent care. Hearing back from our patients is one way we can continue to improve our services. Please take a few minutes to complete the written survey that you may receive in the mail after your visit with us. Thank you!             Your Updated Medication List - Protect others around you: Learn how to safely use, store and throw away your medicines at www.disposemymeds.org.          This list is accurate as of 9/14/18  3:47 PM.  Always use your most recent med list.                   Brand Name Dispense Instructions for use Diagnosis    acetaminophen 325 MG tablet    TYLENOL    60 tablet    Take 2 tablets by mouth 3 times daily as needed.    Knee arthroplasty       albuterol 108 (90 Base) MCG/ACT inhaler    PROAIR HFA/PROVENTIL HFA/VENTOLIN HFA     Inhale 1 puff into the lungs 3 times daily as needed for shortness of breath / dyspnea or wheezing    Diffuse large B-cell lymphoma, unspecified body region (H), Loss of weight       amoxicillin 500 MG capsule    AMOXIL    4 capsule    Take 4 capsules (2 grams), by mouth, 1 hour before dental work.    S/P joint replacement       aspirin 81 MG tablet      Take 1 tablet by mouth daily.        augmented betamethasone dipropionate 0.05 % cream    DIPROLENE AF    30 g    Apply topically 2 times daily    Psoriasis       Azelaic Acid 15 % gel    FINACEA    150 g    Apply 0.5 inches topically See Admin Instructions massage thin film gently into afected areas morning and evening    Acne rosacea       azelaic acid 20 % cream    AZELEX    30 g    Apply topically 2 times daily    Rosacea       benzonatate 100  MG capsule    TESSALON    42 capsule    Take 1 capsule (100 mg) by mouth 3 times daily as needed    Cough       calcipotriene 0.005 % Oint     120 g    Can use twice daily to affected areas of psoriasis, in between topical steroid uses.    Inverse psoriasis       CALCIUM 600+D PO      Take 1,200 mg by mouth daily.        desonide 0.05 % ointment    DESOWEN    60 g    Apply topically 2 times daily Apply to affected areas of face twice daily until resolved, then taper to once daily for one week.    Dermatitis, seborrheic       hydroxychloroquine 200 MG tablet    PLAQUENIL    180 tablet    Take 2 tablets (400 mg) by mouth daily    Rheumatoid arthritis of multiple sites with negative rheumatoid factor (H), Encounter for long-term current use of medication, Disorder of bone and cartilage, Long-term use of immunosuppressant medication       ketoconazole 2 % cream    NIZORAL    60 g    Apply topically 2 times daily    Tinea cruris       order for DME     1 Package    A pair of foot orthotics.    Rheumatoid arthritis(714.0)       order for DME     2 Units    Equipment being ordered: cranial prosthesis. 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    Alopecia, unspecified, DLBCL (diffuse large B cell lymphoma) (H)       * predniSONE 5 MG tablet    DELTASONE    90 tablet    Take 1 tablet (5 mg) by mouth daily    Rheumatoid arthritis of multiple sites with negative rheumatoid factor (H), Encounter for long-term current use of medication, Disorder of bone and cartilage, Long-term use of immunosuppressant medication       * predniSONE 5 MG tablet    DELTASONE    98 tablet    Take 4 tablets (20 mg) daily for 2 weeks, then take 2 tablets (10 mg) daily for 2 weeks, then back to 5 mg daily.    Flare of rheumatoid arthritis (H), Rheumatoid arthritis of multiple sites with negative rheumatoid factor (H), Long-term use of immunosuppressant medication       triamcinolone 0.1 % ointment    KENALOG    30 g     Apply to affected area twice daily for two weeks at a time.    Inverse psoriasis       * Notice:  This list has 2 medication(s) that are the same as other medications prescribed for you. Read the directions carefully, and ask your doctor or other care provider to review them with you.

## 2018-09-14 NOTE — PROGRESS NOTES
Oncology Follow-up visit:  Date on this visit: Sep 14, 2018      Primary Care Physician: Dr. Balwinder Hutton, [place], [place]    Rheumatology team: Dr. Jevon Moreno [place]      Dx:  Diffuse large B cell lymphoma    Oncologic History:  She woke up on 3/19/2015 with R chest wall bump. CT chest with contrast on 3/31/2015 showed a soft tissue lesion inseparable from the right costosternal junction which measured 6.1x6.6cm. There was no associated mediastinal, hilar or axillary lymphadenopathy. She underwent an US and a biopsy of the chest wall lesion and pathology showed findings c/w Diffuse large B cell lymphoma, germinal center type large B-cell lymphoma. The lymphoma cells were CD20+, BCL-6+, and CD23+. BCL-2 demonstrated only scattered weak equivocal staining. There was no significant staining for CD30, CD10, MUM-1, or CD15 is noted in the cells of interest. Ki-67 demonstrated a high proliferation rate estimated at 90% of the tumor cells. Chromogenic in situ  hybridization for EBV (VÍCTOR) was  negative in the lymphomatous cells.  Bone marrow biopsy performed on 4/8/2015 showed no morphologic evidence of lymphoma. There was no immunophenotypic evidence of lymphoma.  She saw Dr. Benavidez with hematology/oncology in [facility] on 4/8/2015 and was recommended to enroll in a clinical trial, [facility]  where patients with early stage DLBCL are given RCHOPx3 followed by PET ct scan and it the PET is positive, she would get involved field radiation and Zevalin and if the PET is negaitve she would get an additional cycle of RCHOP.   She sought a second opinion with us.   PET/CT of the chest, abdomen and pelvis on 4/10/2015 showed multiple hypermetabolic soft tissue nodules in the right anterior chest wall as well as a hypermetabolic right supraclavicular lymph node (hypermetabolic, 1.2 x 1.6 cm).  There was a 3.0 x 5.1 cm soft tissue mass at the right second sternocostal junction is hypermetabolic  with a maximum SUV of 15.3 and a 0.8 x 2.0 cm soft tissue nodule between the third and fourth ribs anteriorly which was mildly hypermetabolic with a maximum SUV of 6.9. 0.7 x 1.9 and 1.4 x 2.4 cm soft tissue nodules at the fourth sternocostal junction have a maximal  SUV of 12.9 and 7.1, respectively. Additionally, there was a  2 mm right upper lobe pulmonary nodule.    Given extranodal disease, she had an LP performed at [facility] on 4/13/2015. 4cc clear CSF removed. 2 cc sent for flow cytometry. Cytology showed rare lymphocytes that showed smooth nuclear contours. Flow cytometry did not show a monoclonal B cell population.      She proceeded with cycle 1 of R-CHOP on 4/20/2015, with Neulasta support. She has received 3 cycles so far, last on 6/2/2015.  She has tolerated chemotherapy very well and her R chest wall mass has almost entirely disappeared over the course of the first 3-4 days of the first cycle.   She underwent PET/CT scan on 6/20/2015 which showed complete radiographic response to treatment.  She has completed 3000cGY to R anterior CW from 7/2/2015-7/22/2015 under direction of Dr. Foss in [place].    She was noted to have a mildly low IgG in [facility] on 4/27/2018.    Echocardiogram 4/10/2015:  Left Ventricle EF of 60-65%. RV function normal. Mild left atrial enlargement.      History Of Present Illness:  Ms. Garcia is a 72 year old female who presents for follow-up of treated Diffuse large B cell lymphoma, as above.  She moved to [place] but continues to visit family in [facility] and continues her f/up with us. She has been seeing Dr. Jevon Moreno, in [place], [place]. She is on  prednisone for RA down to 5 mg PO daily but was not able to completely wean off prednisone. . She has received two doses of Rituxan in October 2017 but not since. 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  Her 3 D screening mammogram was negative on  9/12/2018.  She denies fevers, chills, night sweats. She is worried about 15-20 lbs weight loss that she lost unintentionally in the last 6-7 months.  She has no other health related complaints.  I have reviewed her recent outside labs from [email or web address] in [place]. She had normal LFTs and creat on 7/17/2018 but her Hb was low at 11.3 g/dl (reference range for outside lab 11.9-16g/dl).  WBC and platelet counts were normal. ALC was low at 0.71. ANC was normal at 6.92.  She is on an inhaler for seasonal allergies.   In addition, a complete 12 point  review of systems is negative.    Past Medical/Surgical History:  Past Medical History:   Diagnosis Date     Chronic pain      Psoriasis      Rheumatoid arthritis(714.0)      Steroid long-term use      Past Surgical History:   Procedure Laterality Date     ARTHROPLASTY KNEE  9/20/2011    Procedure:ARTHROPLASTY KNEE; Left Total Knee Replacement ; Surgeon:BLAYNE GAYTAN; Location:[facility]     ARTHROSCOPY KNEE BILATERAL       EXTRACAPSULAR CATARACT EXTRATION WITH INTRAOCULAR LENS IMPLANT      right eye 1/2011     PHACOEMULSIFICATION CLEAR CORNEA WITH STANDARD INTRAOCULAR LENS IMPLANT  4/16/2013    Procedure: PHACOEMULSIFICATION CLEAR CORNEA WITH STANDARD INTRAOCULAR LENS IMPLANT;  LEFT EYE PHACOEMULSIFICATION CLEAR CORNEA WITH STANDARD INTRAOCULAR LENS IMPLANT ;  Surgeon: Tawanda Sanchez MD;  Location: [facility]     SURGICAL HISTORY OF -   1/19/2011    Cataract, right eye     SYNOVECTOMY HIP      not hip/ bilateral hands   She saw Dr. Sanchez in 2015. and had a L groin lesion biopsied and it was c/w psoriasis on Bx and she will be following up with him.   As far as RA, she was diagnosed in 2000, had focally erosive disease and has been on Enbrel and Remicade in the past. Prior to Dx of DLBCL she was on Humira and MTX.   FHx and Social Hx reviewed.    Allergies:  Allergies as of 09/14/2018 - Jagdeep as Reviewed 02/08/2018   Allergen Reaction Noted     Codeine Nausea and Vomiting  01/31/2006     Ibuprofen Itching 07/06/2005     Pneumovax [pneumococcal polysaccharides] Other (See Comments) 08/07/2017     Sulfa drugs Hives 01/31/2006     Current Medications:  Current Outpatient Prescriptions   Medication Sig Dispense Refill     acetaminophen (TYLENOL) 325 MG tablet Take 2 tablets by mouth 3 times daily as needed. 60 tablet 0     amoxicillin (AMOXIL) 500 MG capsule Take 4 capsules (2 grams), by mouth, 1 hour before dental work. 4 capsule 3     aspirin 81 MG tablet Take 1 tablet by mouth daily.       augmented betamethasone dipropionate (DIPROLENE AF) 0.05 % cream Apply topically 2 times daily 30 g 0     azelaic acid (AZELEX) 20 % cream Apply topically 2 times daily (Patient not taking: Reported on 2/8/2018) 30 g 11     Azelaic Acid (FINACEA) 15 % gel Apply 0.5 inches topically See Admin Instructions massage thin film gently into afected areas morning and evening 150 g 3     benzonatate (TESSALON) 100 MG capsule Take 1 capsule (100 mg) by mouth 3 times daily as needed 42 capsule 1     calcipotriene 0.005 % OINT Can use twice daily to affected areas of psoriasis, in between topical steroid uses. (Patient not taking: Reported on 2/8/2018) 120 g 1     Calcium Carbonate-Vitamin D (CALCIUM 600+D PO) Take 1,200 mg by mouth daily.       desonide (DESOWEN) 0.05 % ointment Apply topically 2 times daily Apply to affected areas of face twice daily until resolved, then taper to once daily for one week. 60 g 1     hydroxychloroquine (PLAQUENIL) 200 MG tablet Take 2 tablets (400 mg) by mouth daily 180 tablet 0     ketoconazole (NIZORAL) 2 % cream Apply topically 2 times daily (Patient not taking: Reported on 2/8/2018) 60 g 1     ORDER FOR DME Equipment being ordered: cranial prosthesis.  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 2 Units 0     ORDER FOR DME A pair of foot orthotics. 1 Package 0     predniSONE (DELTASONE) 5 MG tablet Take 4 tablets (20 mg) daily for 2 weeks, then  "take 2 tablets (10 mg) daily for 2 weeks, then back to 5 mg daily. 98 tablet 0     predniSONE (DELTASONE) 5 MG tablet Take 1 tablet (5 mg) by mouth daily (Patient not taking: Reported on 2/8/2018) 90 tablet 0     triamcinolone (KENALOG) 0.1 % ointment Apply to affected area twice daily for two weeks at a time. 30 g 2        Physical Exam:    /78  Pulse 66  Temp 98.5  F (36.9  C)  Resp 18  Ht 1.575 m (5' 2.01\")  Wt 72.1 kg (159 lb)  SpO2 95%  BMI 29.07 kg/m2      GENERAL APPEARANCE: healthy, alert and no distress     HENT: Mouth without ulcers or lesions. No throat erythema.     NECK: no adenopathy, no asymmetry or masses     LYMPHATICS: No cervical, supraclavicular, axillary lymphadenopathy b/l     RESP: lungs clear to auscultation - no rales, rhonchi or wheezes     CARDIOVASCULAR: regular rates and rhythm, normal S1 S2, no S3 or S4 and no murmur.     ABDOMEN:  soft, nontender, no HSM or masses and bowel sounds normal     MUSCULOSKELETAL: extremities normal- no gross deformities noted, no evidence of inflammation in joints, FROM in all extremities. No edema b/l LE.  Chest: No right chest wall masses. Incision healed well.      SKIN: no suspicious lesions or rashes     PSYCHIATRIC: mentation appears normal and affect normal  Breast: No axillary lymphadenopathy b/l.    Laboratory/Imaging Studies    Component      Latest Ref Rng & Units 9/14/2018   WBC      4.0 - 11.0 10e9/L 9.8   RBC Count      3.8 - 5.2 10e12/L 4.17   Hemoglobin      11.7 - 15.7 g/dL 10.9 (L)   Hematocrit      35.0 - 47.0 % 35.7   MCV      78 - 100 fl 86   MCH      26.5 - 33.0 pg 26.1 (L)   MCHC      31.5 - 36.5 g/dL 30.5 (L)   RDW      10.0 - 15.0 % 15.2 (H)   Platelet Count      150 - 450 10e9/L 301   % Neutrophils      % 89.8   % Lymphocytes      % 4.5   % Monocytes      % 4.4   % Eosinophils      % 0.4   % Basophils      % 0.4   % Immature Granulocytes      % 0.5   Absolute Neutrophil      1.6 - 8.3 10e9/L 8.8 (H)   Absolute " Lymphocytes      0.8 - 5.3 10e9/L 0.4 (L)   Absolute Monocytes      0.0 - 1.3 10e9/L 0.4   Absolute Eosinophils      0.0 - 0.7 10e9/L 0.0   Absolute Basophils      0.0 - 0.2 10e9/L 0.0   Abs Immature Granulocytes      0 - 0.4 10e9/L 0.1   Diff Method       Automated Method   Sodium      133 - 144 mmol/L 142   Potassium      3.4 - 5.3 mmol/L 4.3   Chloride      94 - 109 mmol/L 106   Carbon Dioxide      20 - 32 mmol/L 28   Anion Gap      3 - 14 mmol/L 8   Glucose      70 - 99 mg/dL 153 (H)   Urea Nitrogen      7 - 30 mg/dL 26   Creatinine      0.52 - 1.04 mg/dL 0.88   GFR Estimate      >60 mL/min/1.7m2 63   GFR Estimate If Black      >60 mL/min/1.7m2 76   Calcium      8.5 - 10.1 mg/dL 9.2   Bilirubin Total      0.2 - 1.3 mg/dL 0.2   Albumin      3.4 - 5.0 g/dL 3.1 (L)   Protein Total      6.8 - 8.8 g/dL 6.5 (L)   Alkaline Phosphatase      40 - 150 U/L 69   ALT      0 - 50 U/L 25   AST      0 - 45 U/L 16   Lactate Dehydrogenase      81 - 234 U/L 183   Uric Acid      2.6 - 6.0 mg/dL 4.0       ASSESSMENT/PLAN:  The patient is a very pleasant 72 year old woman with stage IIAE Diffuse large B cell lymphoma involving right anterior chest wall, right supraclavicular LN and eroding into sternum. She falls in the low-intermediate risk IPI group when not adjusted for age, with IPI score of 2 which with associated with 67% CR rate with treatment and 51% 5 year overall survival rate. If adjusted for age, she would fall into low risk or IPI of 0 score, associated with CR rate of 91% and 56% 5 year overall survival.  She has had an excellent clinical response after one cycle of RCHOP and is in complete radiographic CR after 3 cycles of  R-CHOP, last on 6/2/2015. She has completed 3000cGY to R anterior CW from 7/2/2015-7/22/2015 under direction of Dr. Foss in [place].  She had no e/o disease recurrence on her last CT chest form 6/2016.    1. Diffuse large B cell lymphoma -  She has no e/o disease recurrence clinically. However,  she is mildly anemic and has had weight loss and we'll proceed with CT of the chest, abdomen and pelvis to r/o reoccurrence.  Per NCCN guidelines (V2.2015) f/up of stage II Diffuse large B cell lymphoma includes H&P and labs every 3-6 months for 5 years, then annually and imaging as clinically indicated.  F/up based on the results.   2. Breast Cancer Screening - b/l screening mammogram negative 9/2018. F/up in one year.  3. Colon Cancer Screening - colonoscopy on 4/16/2015. The exam was normal and repeat was recommended in 5 years for screening purposes.    4. RA - currently off MTX and on low dose  Prednisone. She is s/p Rituxan weekly x 2 in October 2017. F/up with Dr. Montilla in  [place].     5. Social - She lives in [place] now with her daughter's family but comes back to [place] to visit family here to and prefers to f/up with us when she is here.  6. Mild anemia, weight loss- CT of the chest, abdomen and pelvis for further evaluation as above. Proceed with anemia workup including iron studies and ferritin, review peripheral blood smear, check TSH, B12, folate.    7. Psoriasis (n/a today)- f/up with dermatology. She saw Dr. Sanchez in  in the past.    At the end of our visit patient verbalized understanding and concurred with the plan.    Addendum:  Anemia could be related to anemia of chronic disease (RA). Patient recommended to try ferrous sulfate 325mg PO QOD.  Neutrophilia secondary to prednisone. Lymphocytopenia likely secondary to Rituxan.  Peripheral Blood Smear:   -Slight normochromic, normocytic anemia, no increase in erythrocyte   regeneration   -No morphologic evidence of hemolysis   -Neutrophilia   -Lymphocytopenia    Component      Latest Ref Rng & Units 9/14/2018   Iron      35 - 180 ug/dL 18 (L)   Iron Binding Cap      240 - 430 ug/dL 270   Iron Saturation Index      15 - 46 % 7 (L)   % Retic      0.5 - 2.0 % 1.6   Absolute Retic      25 - 95 10e9/L 67.4   Ferritin      8 - 252 ng/mL 163   Vitamin  B12      193 - 986 pg/mL 787   TSH      0.40 - 4.00 mU/L 0.66   Folate      >5.4 ng/mL 24.4     CT of the chest, abdomen and pelvis 9/17/2018:  1. Low-density thyroid nodules would be better evaluated with  ultrasound. They are grossly similar to prior studies.  2. No evidence of recurrent lymphoma in the chest, abdomen, or pelvis.  3. 2.4 cm benign-appearing lucent lesion in the left femoral neck may  represent a synovial pit. Although this appears to be a benign lesion,  the size appears to increase the risk of femoral neck fracture.  Recommend orthopedic referral.    Recommend f/up with rheumatologist for Hb recheck.   Recommend thyroid US for evaluation of thyroid nodules. Recommend she see an orthopedic surgeon for a lucent lesion in the left femoral neck.  She is leaving for [place] on 9/18 and can proceed with evaluation there.    Addendum:  Pt seen by orthopedist Dr. Leroy Holbrook in [place] on 9/28/2018. She had no L hip pain and L hip xrays showed a multifocal cystic looking lesion of the superior part of L femoral neck that was well circumscribed and nonconcerning appearing. There was felt to be no compromise to structural integrity and no f/up was felt to be needed.

## 2018-09-14 NOTE — LETTER
9/14/2018         RE: Randi Garcia  [address] [facility] [address]        Dear Colleague,    Thank you for referring your patient, Randi Garcia, to the [facility]. Please see a copy of my visit note below.    Oncology Follow-up visit:  Date on this visit: Sep 14, 2018      Primary Care Physician: Dr. Balwinder Hutton, [place], [place]    Rheumatology team: Dr. Jevon Moreno, [place]      Dx:  Diffuse large B cell lymphoma    Oncologic History:  She woke up on 3/19/2015 with R chest wall bump. CT chest with contrast on 3/31/2015 showed a soft tissue lesion inseparable from the right costosternal junction which measured 6.1x6.6cm. There was no associated mediastinal, hilar or axillary lymphadenopathy. She underwent an US and a biopsy of the chest wall lesion and pathology showed findings c/w Diffuse large B cell lymphoma, germinal center type large B-cell lymphoma. The lymphoma cells were CD20+, BCL-6+, and CD23+. BCL-2 demonstrated only scattered weak equivocal staining. There was no significant staining for CD30, CD10, MUM-1, or CD15 is noted in the cells of interest. Ki-67 demonstrated a high proliferation rate estimated at 90% of the tumor cells. Chromogenic in situ  hybridization for EBV (VÍCTOR) was  negative in the lymphomatous cells.  Bone marrow biopsy performed on 4/8/2015 showed no morphologic evidence of lymphoma. There was no immunophenotypic evidence of lymphoma.  She saw Dr. Benavidez with hematology/oncology in [facility] on 4/8/2015 and was recommended to enroll in a clinical trial, S[facility]  where patients with early stage DLBCL are given RCHOPx3 followed by PET ct scan and it the PET is positive, she would get involved field radiation and Zevalin and if the PET is negaitve she would get an additional cycle of RCHOP.   She sought a second opinion with us.   PET/CT of the chest, abdomen and pelvis on 4/10/2015 showed multiple  hypermetabolic soft tissue nodules in the right anterior chest wall as well as a hypermetabolic right supraclavicular lymph node (hypermetabolic, 1.2 x 1.6 cm).  There was a 3.0 x 5.1 cm soft tissue mass at the right second sternocostal junction is hypermetabolic with a maximum SUV of 15.3 and a 0.8 x 2.0 cm soft tissue nodule between the third and fourth ribs anteriorly which was mildly hypermetabolic with a maximum SUV of 6.9. 0.7 x 1.9 and 1.4 x 2.4 cm soft tissue nodules at the fourth sternocostal junction have a maximal  SUV of 12.9 and 7.1, respectively. Additionally, there was a  2 mm right upper lobe pulmonary nodule.    Given extranodal disease, she had an LP performed at [facility] on 4/13/2015. 4cc clear CSF removed. 2 cc sent for flow cytometry. Cytology showed rare lymphocytes that showed smooth nuclear contours. Flow cytometry did not show a monoclonal B cell population.      She proceeded with cycle 1 of R-CHOP on 4/20/2015, with Neulasta support. She has received 3 cycles so far, last on 6/2/2015.  She has tolerated chemotherapy very well and her R chest wall mass has almost entirely disappeared over the course of the first 3-4 days of the first cycle.   She underwent PET/CT scan on 6/20/2015 which showed complete radiographic response to treatment.  She has completed 3000cGY to R anterior CW from 7/2/2015-7/22/2015 under direction of Dr. Foss in [place].    [facility] a [facility].    Echocardiogram 4/10/2015:  Left Ventricle EF of 60-65%. RV function normal. Mild left atrial enlargement.      History Of Present Illness:  Ms. Garcia is a 72 year old female who presents for follow-up of treated Diffuse large B cell lymphoma, as above.  She moved to [place] but continues to visit family in [facility] and continues her f/up with us. She has been seeing Dr. Jevon Moreno, in [place], [place]. She is on  prednisone for RA down to 5 mg  PO daily but was not able to completely wean off prednisone. . She has received two doses of Rituxan in October 2017 but not since. 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  Her 3 D screening mammogram was negative on 9/12/2018.  She denies fevers, chills, night sweats. She is worried about 15-20 lbs weight loss that she lost unintentionally in the last 6-7 months.  She has no other health related complaints.  I have reviewed her recent outside labs from [facility] in [place]. She had normal LFTs and creat on 7/17/2018 but her Hb was low at 11.3 g/dl (reference range for outside lab 11.9-16g/dl).  WBC and platelet counts were normal. ALC was low at 0.71. ANC was normal at 6.92.  She is on an inhaler for seasonal allergies.   In addition, a complete 12 point  review of systems is negative.    Past Medical/Surgical History:  Past Medical History:   Diagnosis Date     Chronic pain      Psoriasis      Rheumatoid arthritis(714.0)      Steroid long-term use      Past Surgical History:   Procedure Laterality Date     ARTHROPLASTY KNEE  9/20/2011    Procedure:ARTHROPLASTY KNEE; Left Total Knee Replacement ; Surgeon:BLAYNE GAYTAN; Location:[facility]     ARTHROSCOPY KNEE BILATERAL       EXTRACAPSULAR CATARACT EXTRATION WITH INTRAOCULAR LENS IMPLANT      right eye 1/2011     PHACOEMULSIFICATION CLEAR CORNEA WITH STANDARD INTRAOCULAR LENS IMPLANT  4/16/2013    Procedure: PHACOEMULSIFICATION CLEAR CORNEA WITH STANDARD INTRAOCULAR LENS IMPLANT;  LEFT EYE PHACOEMULSIFICATION CLEAR CORNEA WITH STANDARD INTRAOCULAR LENS IMPLANT ;  Surgeon: Tawanda Sanchez MD;  Location: [facility]     SURGICAL HISTORY OF -   1/19/2011    Cataract, right eye     SYNOVECTOMY HIP      not hip/ bilateral hands   She saw Dr. Sanchez in 2015. and had a L groin lesion biopsied and it was c/w psoriasis on Bx and she will be following up with him.   As far as RA, she was diagnosed in 2000, had focally erosive  disease and has been on Enbrel and Remicade in the past. Prior to Dx of DLBCL she was on Humira and MTX.   FHx and Social Hx reviewed.    Allergies:  Allergies as of 09/14/2018 - Jagdeep as Reviewed 02/08/2018   Allergen Reaction Noted     Codeine Nausea and Vomiting 01/31/2006     Ibuprofen Itching 07/06/2005     Pneumovax [pneumococcal polysaccharides] Other (See Comments) 08/07/2017     Sulfa drugs Hives 01/31/2006     Current Medications:  Current Outpatient Prescriptions   Medication Sig Dispense Refill     acetaminophen (TYLENOL) 325 MG tablet Take 2 tablets by mouth 3 times daily as needed. 60 tablet 0     amoxicillin (AMOXIL) 500 MG capsule Take 4 capsules (2 grams), by mouth, 1 hour before dental work. 4 capsule 3     aspirin 81 MG tablet Take 1 tablet by mouth daily.       augmented betamethasone dipropionate (DIPROLENE AF) 0.05 % cream Apply topically 2 times daily 30 g 0     azelaic acid (AZELEX) 20 % cream Apply topically 2 times daily (Patient not taking: Reported on 2/8/2018) 30 g 11     Azelaic Acid (FINACEA) 15 % gel Apply 0.5 inches topically See Admin Instructions massage thin film gently into afected areas morning and evening 150 g 3     benzonatate (TESSALON) 100 MG capsule Take 1 capsule (100 mg) by mouth 3 times daily as needed 42 capsule 1     calcipotriene 0.005 % OINT Can use twice daily to affected areas of psoriasis, in between topical steroid uses. (Patient not taking: Reported on 2/8/2018) 120 g 1     Calcium Carbonate-Vitamin D (CALCIUM 600+D PO) Take 1,200 mg by mouth daily.       desonide (DESOWEN) 0.05 % ointment Apply topically 2 times daily Apply to affected areas of face twice daily until resolved, then taper to once daily for one week. 60 g 1     hydroxychloroquine (PLAQUENIL) 200 MG tablet Take 2 tablets (400 mg) by mouth daily 180 tablet 0     ketoconazole (NIZORAL) 2 % cream Apply topically 2 times daily (Patient not taking: Reported on 2/8/2018) 60 g 1     ORDER FOR DME  "Equipment being ordered: cranial prosthesis.  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 2 Units 0     ORDER FOR DME A pair of foot orthotics. 1 Package 0     predniSONE (DELTASONE) 5 MG tablet Take 4 tablets (20 mg) daily for 2 weeks, then take 2 tablets (10 mg) daily for 2 weeks, then back to 5 mg daily. 98 tablet 0     predniSONE (DELTASONE) 5 MG tablet Take 1 tablet (5 mg) by mouth daily (Patient not taking: Reported on 2/8/2018) 90 tablet 0     triamcinolone (KENALOG) 0.1 % ointment Apply to affected area twice daily for two weeks at a time. 30 g 2        Physical Exam:    /78  Pulse 66  Temp 98.5  F (36.9  C)  Resp 18  Ht 1.575 m (5' 2.01\")  Wt 72.1 kg (159 lb)  SpO2 95%  BMI 29.07 kg/m2      GENERAL APPEARANCE: healthy, alert and no distress     HENT: Mouth without ulcers or lesions. No throat erythema.     NECK: no adenopathy, no asymmetry or masses     LYMPHATICS: No cervical, supraclavicular, axillary lymphadenopathy b/l     RESP: lungs clear to auscultation - no rales, rhonchi or wheezes     CARDIOVASCULAR: regular rates and rhythm, normal S1 S2, no S3 or S4 and no murmur.     ABDOMEN:  soft, nontender, no HSM or masses and bowel sounds normal     MUSCULOSKELETAL: extremities normal- no gross deformities noted, no evidence of inflammation in joints, FROM in all extremities. No edema b/l LE.  Chest: No right chest wall masses. Incision healed well.      SKIN: no suspicious lesions or rashes     PSYCHIATRIC: mentation appears normal and affect normal  Breast: No axillary lymphadenopathy b/l.    Laboratory/Imaging Studies    Component      Latest Ref Rng & Units 9/14/2018   WBC      4.0 - 11.0 10e9/L 9.8   RBC Count      3.8 - 5.2 10e12/L 4.17   Hemoglobin      11.7 - 15.7 g/dL 10.9 (L)   Hematocrit      35.0 - 47.0 % 35.7   MCV      78 - 100 fl 86   MCH      26.5 - 33.0 pg 26.1 (L)   MCHC      31.5 - 36.5 g/dL 30.5 (L)   RDW      10.0 - 15.0 % 15.2 (H)   Platelet " Count      150 - 450 10e9/L 301   % Neutrophils      % 89.8   % Lymphocytes      % 4.5   % Monocytes      % 4.4   % Eosinophils      % 0.4   % Basophils      % 0.4   % Immature Granulocytes      % 0.5   Absolute Neutrophil      1.6 - 8.3 10e9/L 8.8 (H)   Absolute Lymphocytes      0.8 - 5.3 10e9/L 0.4 (L)   Absolute Monocytes      0.0 - 1.3 10e9/L 0.4   Absolute Eosinophils      0.0 - 0.7 10e9/L 0.0   Absolute Basophils      0.0 - 0.2 10e9/L 0.0   Abs Immature Granulocytes      0 - 0.4 10e9/L 0.1   Diff Method       Automated Method   Sodium      133 - 144 mmol/L 142   Potassium      3.4 - 5.3 mmol/L 4.3   Chloride      94 - 109 mmol/L 106   Carbon Dioxide      20 - 32 mmol/L 28   Anion Gap      3 - 14 mmol/L 8   Glucose      70 - 99 mg/dL 153 (H)   Urea Nitrogen      7 - 30 mg/dL 26   Creatinine      0.52 - 1.04 mg/dL 0.88   GFR Estimate      >60 mL/min/1.7m2 63   GFR Estimate If Black      >60 mL/min/1.7m2 76   Calcium      8.5 - 10.1 mg/dL 9.2   Bilirubin Total      0.2 - 1.3 mg/dL 0.2   Albumin      3.4 - 5.0 g/dL 3.1 (L)   Protein Total      6.8 - 8.8 g/dL 6.5 (L)   Alkaline Phosphatase      40 - 150 U/L 69   ALT      0 - 50 U/L 25   AST      0 - 45 U/L 16   Lactate Dehydrogenase      81 - 234 U/L 183   Uric Acid      2.6 - 6.0 mg/dL 4.0       ASSESSMENT/PLAN:  The patient is a very pleasant 72 year old woman with stage IIAE Diffuse large B cell lymphoma involving right anterior chest wall, right supraclavicular LN and eroding into sternum. She falls in the low-intermediate risk IPI group when not adjusted for age, with IPI score of 2 which with associated with 67% CR rate with treatment and 51% 5 year overall survival rate. If adjusted for age, she would fall into low risk or IPI of 0 score, associated with CR rate of 91% and 56% 5 year overall survival.  She has had an excellent clinical response after one cycle of RCHOP and is in complete radiographic CR after 3 cycles of  R-CHOP, last on 6/2/2015. She has  completed 3000cGY to R anterior CW from 7/2/2015-7/22/2015 under direction of Dr. Foss in [place].  She had no e/o disease recurrence on her last CT chest form 6/2016.    1. Diffuse large B cell lymphoma -  She has no e/o disease recurrence clinically. However, she is mildly anemic and has had weight loss and we'll proceed with CT of the chest, abdomen and pelvis to r/o reoccurrence.  Per NCCN guidelines (V2.2015) f/up of stage II Diffuse large B cell lymphoma includes H&P and labs every 3-6 months for 5 years, then annually and imaging as clinically indicated.  F/up based on the results.   2. Breast Cancer Screening - b/l screening mammogram negative 9/2018. F/up in one year.  3. Colon Cancer Screening - colonoscopy on 4/16/2015. The exam was normal and repeat was recommended in 5 years for screening purposes.    4. RA - currently off MTX and on low dose  Prednisone. She is s/p Rituxan weekly x 2 in October 2017. F/up with Dr. Montilla in  [place].     5. Social - She lives in [place] now with her daughter's family but comes back to [place] to visit family here to and prefers to f/up with us when she is here.  6. Mild anemia, weight loss- CT of the chest, abdomen and pelvis for further evaluation as above. Proceed with anemia workup including iron studies and ferritin, review peripheral blood smear, check TSH, B12, folate.    7. Psoriasis (n/a today)- f/up with dermatology. She saw Dr. Sanchez in  in the past.    At the end of our visit patient verbalized understanding and concurred with the plan.    Addendum:  Anemia could be related to anemia of chronic disease (RA). Patient recommended to try ferrous sulfate 325mg PO QOD.  Neutrophilia secondary to prednisone. Lymphocytopenia likely secondary to Rituxan.  Peripheral Blood Smear:   -Slight normochromic, normocytic anemia, no increase in erythrocyte   regeneration   -No morphologic evidence of hemolysis   -Neutrophilia   -Lymphocytopenia    Component      Latest  Ref Rng & Units 9/14/2018   Iron      35 - 180 ug/dL 18 (L)   Iron Binding Cap      240 - 430 ug/dL 270   Iron Saturation Index      15 - 46 % 7 (L)   % Retic      0.5 - 2.0 % 1.6   Absolute Retic      25 - 95 10e9/L 67.4   Ferritin      8 - 252 ng/mL 163   Vitamin B12      193 - 986 pg/mL 787   TSH      0.40 - 4.00 mU/L 0.66   Folate      >5.4 ng/mL 24.4     CT of the chest, abdomen and pelvis 9/17/2018:  1. Low-density thyroid nodules would be better evaluated with  ultrasound. They are grossly similar to prior studies.  2. No evidence of recurrent lymphoma in the chest, abdomen, or pelvis.  3. 2.4 cm benign-appearing lucent lesion in the left femoral neck may  represent a synovial pit. Although this appears to be a benign lesion,  the size appears to increase the risk of femoral neck fracture.  Recommend orthopedic referral.    Recommend f/up with rheumatologist for Hb recheck.   Recommend thyroid US for evaluation of thyroid nodules. Recommend she see an orthopedic surgeon for a lucent lesion in the left femoral neck.  She is leaving for [place] on 9/18 and can proceed with evaluation there.      Again, thank you for allowing me to participate in the care of your patient.        Sincerely,        Sue Garcia MD, MD

## 2018-09-17 ENCOUNTER — RADIANT APPOINTMENT (OUTPATIENT)
Dept: CT IMAGING | Facility: CLINIC | Age: 73
End: 2018-09-17
Attending: INTERNAL MEDICINE
Payer: COMMERCIAL

## 2018-09-17 ENCOUNTER — CARE COORDINATION (OUTPATIENT)
Dept: ONCOLOGY | Facility: CLINIC | Age: 73
End: 2018-09-17

## 2018-09-17 DIAGNOSIS — R63.4 LOSS OF WEIGHT: ICD-10-CM

## 2018-09-17 DIAGNOSIS — C83.30 DIFFUSE LARGE B-CELL LYMPHOMA, UNSPECIFIED BODY REGION (H): ICD-10-CM

## 2018-09-17 LAB — COPATH REPORT: NORMAL

## 2018-09-17 PROCEDURE — 74177 CT ABD & PELVIS W/CONTRAST: CPT | Mod: TC

## 2018-09-17 PROCEDURE — 71260 CT THORAX DX C+: CPT | Mod: TC

## 2018-09-17 RX ORDER — IOPAMIDOL 755 MG/ML
78 INJECTION, SOLUTION INTRAVASCULAR ONCE
Status: COMPLETED | OUTPATIENT
Start: 2018-09-17 | End: 2018-09-17

## 2018-09-17 RX ADMIN — IOPAMIDOL 78 ML: 755 INJECTION, SOLUTION INTRAVASCULAR at 08:19

## 2018-09-17 NOTE — PROGRESS NOTES
Spoke with patient and gave her the following results per Dr. Garcia: Let  Randi know that her  B12 level, thyroid and  Folate level were normal.  As she knows her hemoglobin was low. It might be because her rheumatologic condition is more active.  She maybe iron deficient as her iron levels are somewhat low. Patients may loose iron in the stool without knowing it. It looks like her last colonoscopy was in 2007 and I would recommend she proceed with repeat one at this time. Also, on her CT there is no evidence of lymphoma coming back. There were thyroid nodules likely benign but she would need a thyroid ultrasound for further evaluation.   Also, there was a  2.4 cm benign-appearing lucent lesion in the left femoral neck may   represent a synovial pit. Although this appears to be a benign lesion,   the size appears to increase the risk of femoral neck fracture.   Recommend orthopedic referral.     Correction on note above after discussion with patient - patient stated she had a colonoscopy in 2015, which was normal.  Writer reviewed this with Dr. Garcia and she agreed that per her note she did have colonoscopy in 2015; but not able to fine in EMR.  When writer spoke with patient, she states she requested that results of all her tests were requested to be faxed to Dr. Garcia.  She remembers signing the release.  She will contact [facility] to request again.  Based on this information, it was recommended that she start Ferrous Sulfate 325 mg every other day.  She agrees with plan  and will call if she has further questions.  She will call to schedule her follow-up with labs in 6 months when she returns to [place].

## 2018-09-28 ENCOUNTER — TRANSFERRED RECORDS (OUTPATIENT)
Dept: HEALTH INFORMATION MANAGEMENT | Facility: CLINIC | Age: 73
End: 2018-09-28

## 2018-12-11 NOTE — PROGRESS NOTES
How Severe Is Your Skin Lesion?: moderate Oncology Follow-up visit:  Date on this visit: 8/3/2017    Primary Care Physician: Dr. Balwinder Hutton, [place], [place]    Rheumatology team: Dr. Jevon Moreno [place]      Dx:  Diffuse large B cell lymphoma    Oncologic History:  She woke up on 3/19/2015 with R chest wall bump. CT chest with contrast on 3/31/2015 showed a soft tissue lesion inseparable from the right costosternal junction which measured 6.1x6.6cm. There was no associated mediastinal, hilar or axillary lymphadenopathy. She underwent an US and a biopsy of the chest wall lesion and pathology showed findings c/w Diffuse large B cell lymphoma, germinal center type large B-cell lymphoma. The lymphoma cells were CD20+, BCL-6+, and CD23+. BCL-2 demonstrated only scattered weak equivocal staining. There was no significant staining for CD30, CD10, MUM-1, or CD15 is noted in the cells of interest. Ki-67 demonstrated a high proliferation rate estimated at 90% of the tumor cells. Chromogenic in situ  hybridization for EBV (VÍCTOR) was  negative in the lymphomatous cells.  Bone marrow biopsy performed on 4/8/2015 showed no morphologic evidence of lymphoma. There was no immunophenotypic evidence of lymphoma.  She saw Dr. Benavidez with hematology/oncology in [facility] on 4/8/2015 and was recommended to enroll in a clinical trial, [facility]  where patients with early stage DLBCL are given RCHOPx3 followed by PET ct scan and it the PET is positive, she would get involved field radiation and Zevalin and if the PET is negaitve she would get an additional cycle of RCHOP.   She sought a second opinion with us.   PET/CT of the chest, abdomen and pelvis on 4/10/2015 showed multiple hypermetabolic soft tissue nodules in the right anterior chest wall as well as a hypermetabolic right supraclavicular lymph node (hypermetabolic, 1.2 x 1.6 cm).  There was a 3.0 x 5.1 cm soft tissue mass at the right second sternocostal junction is hypermetabolic with a  maximum SUV of 15.3 and a 0.8 x 2.0 cm soft tissue nodule between the third and fourth ribs anteriorly which was mildly hypermetabolic with a maximum SUV of 6.9. 0.7 x 1.9 and 1.4 x 2.4 cm soft tissue nodules at the fourth sternocostal junction have a maximal  SUV of 12.9 and 7.1, respectively. Additionally, there was a  2 mm right upper lobe pulmonary nodule.    Given extranodal disease, she had an LP performed at [facility] on 4/13/2015. 4cc clear CSF removed. 2 cc sent for flow cytometry. Cytology showed rare lymphocytes that showed smooth nuclear contours. Flow cytometry did not show a monoclonal B cell population.      She proceeded with cycle 1 of R-CHOP on 4/20/2015, with Neulasta support. She has received 3 cycles so far, last on 6/2/2015.  She has tolerated chemotherapy very well and her R chest wall mass has almost entirely disappeared over the course of the first 3-4 days of the first cycle.   She underwent PET/CT scan on 6/20/2015 which showed complete radiographic response to treatment.  She has completed 3000cGY to R anterior CW from 7/2/2015-7/22/2015 under direction of Dr. Foss in [place].        Echocardiogram 4/10/2015:  Left Ventricle EF of 60-65%. RV function normal. Mild left atrial enlargement.      History Of Present Illness:  Ms. Garcia is a 71 year old female who presents for follow-up of treated Diffuse large B cell lymphoma, as above.  She moved to [place] but continues to visit family in [facility] and continues her f/up with us. She has been seeing Dr. Jevon Moreno, in [place], [place]. She still has mild to moderate joint pains. She is on Plaquenil and prednisone.  Screening mammogram was negative on 8/2/2017.  She denies fevers, chills, night sweats, weight loss. She has no other health related complaints.  In addition, a complete 12 point  review of systems is negative.    Past Medical/Surgical History:  Past Medical History:   Diagnosis Date     Chronic pain       treated_been_treated Is This A New Presentation, Or A Follow-Up?: Skin Lesions Psoriasis      Rheumatoid arthritis(714.0)      Steroid long-term use      Past Surgical History:   Procedure Laterality Date     ARTHROPLASTY KNEE  9/20/2011    Procedure:ARTHROPLASTY KNEE; Left Total Knee Replacement ; Surgeon:BLAYNE GAYTAN; Location:[facility]     ARTHROSCOPY KNEE BILATERAL       EXTRACAPSULAR CATARACT EXTRATION WITH INTRAOCULAR LENS IMPLANT      right eye 1/2011     PHACOEMULSIFICATION CLEAR CORNEA WITH STANDARD INTRAOCULAR LENS IMPLANT  4/16/2013    Procedure: PHACOEMULSIFICATION CLEAR CORNEA WITH STANDARD INTRAOCULAR LENS IMPLANT;  LEFT EYE PHACOEMULSIFICATION CLEAR CORNEA WITH STANDARD INTRAOCULAR LENS IMPLANT ;  Surgeon: Tawanda Sanchez MD;  Location[facility]     SURGICAL HISTORY OF -   1/19/2011    Cataract, right eye     SYNOVECTOMY HIP      not hip/ bilateral hands   She saw Dr. Sanchez in 2015. and had a L groin lesion biopsied and it was c/w psoriasis on Bx and she will be following up with him.   As far as RA, she was diagnosed in 2000, had focally erosive disease and has been on Enbrel and Remicade in the past. Prior to Dx of DLBCL she was on Humira and MTX.   FHx and Social Hx reviewed.    Allergies:  Allergies as of 08/03/2017 - Jagdeep as Reviewed 01/13/2017   Allergen Reaction Noted     Codeine Nausea and Vomiting 01/31/2006     Ibuprofen Itching 07/06/2005     Sulfa drugs Hives 01/31/2006     Current Medications:  Current Outpatient Prescriptions   Medication Sig Dispense Refill     hydroxychloroquine (PLAQUENIL) 200 MG tablet Take 2 tablets (400 mg) by mouth daily 180 tablet 0     azelaic acid (AZELEX) 20 % cream Apply topically 2 times daily 30 g 11     predniSONE (DELTASONE) 5 MG tablet Take 4 tablets (20 mg) daily for 2 weeks, then take 2 tablets (10 mg) daily for 2 weeks, then back to 5 mg daily. 98 tablet 0     predniSONE (DELTASONE) 5 MG tablet Take 1 tablet (5 mg) by mouth daily 90 tablet 0     omeprazole (PRILOSEC) 20 MG capsule Take 1 capsule (20 mg) by mouth daily 90  "capsule 3     augmented betamethasone dipropionate (DIPROLENE AF) 0.05 % cream Apply topically 2 times daily 30 g 0     calcipotriene 0.005 % OINT Can use twice daily to affected areas of psoriasis, in between topical steroid uses. 120 g 1     triamcinolone (KENALOG) 0.1 % ointment Apply to affected area twice daily for two weeks at a time. 30 g 2     desonide (DESOWEN) 0.05 % ointment Apply topically 2 times daily Apply to affected areas of face twice daily until resolved, then taper to once daily for one week. 60 g 1     ketoconazole (NIZORAL) 2 % cream Apply topically 2 times daily 60 g 1     ORDER FOR DME Equipment being ordered: cranial prosthesis.  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 2 Units 0     MULTIPLE VITAMIN PO Take 1 tablet by mouth       benzonatate (TESSALON) 100 MG capsule Take 1 capsule (100 mg) by mouth 3 times daily as needed 42 capsule 1     Azelaic Acid (FINACEA) 15 % gel Apply 0.5 inches topically See Admin Instructions massage thin film gently into afected areas morning and evening 150 g 3     Cholecalciferol (VITAMIN D) 2000 UNITS CAPS Take 1 capsule by mouth daily.       Calcium Carbonate-Vitamin D (CALCIUM 600+D PO) Take 1,200 mg by mouth daily.       aspirin 81 MG tablet Take 1 tablet by mouth daily.       ORDER FOR DME A pair of foot orthotics. 1 Package 0     acetaminophen (TYLENOL) 325 MG tablet Take 2 tablets by mouth 3 times daily as needed. 60 tablet 0     magnesium oxide (MAG-OX) 400 MG tablet Take 1 tablet by mouth daily. 120 tablet 3        Physical Exam:  /65  Pulse 86  Temp 97.4  F (36.3  C) (Oral)  Resp 20  Ht 1.575 m (5' 2.01\")  Wt 79.4 kg (175 lb)  SpO2 97%  BMI 32 kg/m2        GENERAL APPEARANCE: healthy, alert and no distress     HENT: Mouth without ulcers or lesions.      NECK: no adenopathy, no asymmetry or masses     LYMPHATICS: No cervical, supraclavicular, axillary lymphadenopathy b/l     RESP: lungs clear to auscultation " - no rales, rhonchi or wheezes     CARDIOVASCULAR: regular rates and rhythm, normal S1 S2, no S3 or S4 and no murmur.     ABDOMEN:  soft, nontender, no HSM or masses and bowel sounds normal     MUSCULOSKELETAL: extremities normal- no gross deformities noted, no evidence of inflammation in joints, FROM in all extremities. No edema b/l LE.  Chest: No right chest wall masses. Incision healed well.      SKIN: no suspicious lesions or rashes     PSYCHIATRIC: mentation appears normal and affect normal  Breast: No axillary lymphadenopathy b/l.    Laboratory/Imaging Studies  Labs from [place], [place] reviewed from 7/6/2017.  Creat 0.76, low total protein at 5.9, low total joon at 0.2 otherwise LFTs within normal limits  LDh 188. Uric acid 4.4 IgG level low at 533.  Normal WBC at 7.9 with normal ANC of 6.93 low ALC of 0.46 and normal Hb of 12.7 g/dl and platelet count of 221.     ASSESSMENT/PLAN:  The patient is a very pleasant 71 year old woman with stage IIAE Diffuse large B cell lymphoma involving right anterior chest wall, right supraclavicular LN and eroding into sternum. She falls in the low-intermediate risk IPI group when not adjusted for age, with IPI score of 2 which with associated with 67% CR rate with treatment and 51% 5 year overall survival rate. If adjusted for age, she would fall into low risk or IPI of 0 score, associated with CR rate of 91% and 56% 5 year overall survival.  She has had an excellent clinical response after one cycle of RCHOP and is in complete radiographic CR after 3 cycles of  R-CHOP, last on 6/2/2015. She has completed 3000cGY to R anterior CW from 7/2/2015-7/22/2015 under direction of Dr. Foss in [place].  She had no e/o disease recurrence on her last CT chest form 6/2016.    1. Diffuse large B cell lymphoma -  She has no e/o disease recurrence clinically.  Per NCCN guidelines (V2.2015) f/up of stage II Diffuse large B cell lymphoma includes H&P and labs every 3-6 months for 5 years,  then annually and imaging as clinically indicated.  F/up in 6 months with labs. Patient periodically returns to [place] and prefers to continue her f/up with us.    2. Breast Cancer Screening - b/l screening mammogram negative 8/1/2017. F/up in one year (due 08/2018).  3. Colon Cancer Screening - colonoscopy on 4/16/2015. The exam was normal and repeat was recommended in 5 years for screening purposes.    4. RA - currently off MTX.  She is on plaquenil and  Prednisone. She would like to try Rituxan for treatment of her RA to try and taper off prednisone, and I have no objections to further Rituxan use in her situation. I have ccd my note to Dr. Montilla. She has only received 3 doses so far as part of her NHL treatment. F/up with Dr. Montilla in  [place].     5. Social - She lives in [place] now with her daughter's family but comes back to [place] to visit family here to and prefers to f/up with us when she is here.  6. Psoriasis- f/up with dermatology in one year - overdue and patient aware. She saw Dr. Sanchez in  in the past.  7. Due for Pneumovax vaccination- proceed today. Uptodate with Prevnar vaccine.  At the end of our visit patient verbalized understanding and concurred with the plan.

## 2019-03-18 ENCOUNTER — TELEPHONE (OUTPATIENT)
Dept: ORTHOPEDICS | Facility: CLINIC | Age: 74
End: 2019-03-18

## 2019-03-18 DIAGNOSIS — Z96.60 S/P JOINT REPLACEMENT: ICD-10-CM

## 2019-03-18 RX ORDER — AMOXICILLIN 500 MG/1
CAPSULE ORAL
Qty: 8 CAPSULE | Refills: 1 | Status: SHIPPED | OUTPATIENT
Start: 2019-03-18

## 2019-03-18 NOTE — TELEPHONE ENCOUNTER
[facility] Call Center    Phone Message    May a detailed message be left on voicemail: yes    Reason for Call: Other: Pt requesting script of amoxicillin be sent to the [facility] pharmacy in [place]. Pt has a dental appt on 3/20 and has a knee replacement     Action Taken: Message routed to:  [facility] ([facility] ortho

## 2019-03-21 ENCOUNTER — ONCOLOGY VISIT (OUTPATIENT)
Dept: ONCOLOGY | Facility: CLINIC | Age: 74
End: 2019-03-21
Payer: COMMERCIAL

## 2019-03-21 VITALS
DIASTOLIC BLOOD PRESSURE: 84 MMHG | WEIGHT: 165.19 LBS | HEART RATE: 84 BPM | SYSTOLIC BLOOD PRESSURE: 127 MMHG | BODY MASS INDEX: 30.4 KG/M2 | HEIGHT: 62 IN | RESPIRATION RATE: 16 BRPM | TEMPERATURE: 98.3 F | OXYGEN SATURATION: 98 %

## 2019-03-21 DIAGNOSIS — Z79.899 ENCOUNTER FOR LONG-TERM CURRENT USE OF MEDICATION: ICD-10-CM

## 2019-03-21 DIAGNOSIS — Z12.31 VISIT FOR SCREENING MAMMOGRAM: ICD-10-CM

## 2019-03-21 DIAGNOSIS — C83.30 DLBCL (DIFFUSE LARGE B CELL LYMPHOMA) (H): ICD-10-CM

## 2019-03-21 DIAGNOSIS — Z12.11 COLON CANCER SCREENING: ICD-10-CM

## 2019-03-21 DIAGNOSIS — C83.30 DIFFUSE LARGE B-CELL LYMPHOMA, UNSPECIFIED BODY REGION (H): Primary | ICD-10-CM

## 2019-03-21 DIAGNOSIS — Z51.81 ENCOUNTER FOR THERAPEUTIC DRUG MONITORING: ICD-10-CM

## 2019-03-21 LAB
ALBUMIN SERPL-MCNC: 3.4 G/DL (ref 3.4–5)
ALP SERPL-CCNC: 75 U/L (ref 40–150)
ALT SERPL W P-5'-P-CCNC: 23 U/L (ref 0–50)
ANION GAP SERPL CALCULATED.3IONS-SCNC: 5 MMOL/L (ref 3–14)
AST SERPL W P-5'-P-CCNC: 15 U/L (ref 0–45)
BASOPHILS # BLD AUTO: 0 10E9/L (ref 0–0.2)
BASOPHILS NFR BLD AUTO: 0.4 %
BILIRUB SERPL-MCNC: 0.2 MG/DL (ref 0.2–1.3)
BUN SERPL-MCNC: 21 MG/DL (ref 7–30)
CALCIUM SERPL-MCNC: 9 MG/DL (ref 8.5–10.1)
CHLORIDE SERPL-SCNC: 110 MMOL/L (ref 94–109)
CO2 SERPL-SCNC: 27 MMOL/L (ref 20–32)
CREAT SERPL-MCNC: 0.68 MG/DL (ref 0.52–1.04)
DIFFERENTIAL METHOD BLD: ABNORMAL
EOSINOPHIL # BLD AUTO: 0.1 10E9/L (ref 0–0.7)
EOSINOPHIL NFR BLD AUTO: 0.8 %
ERYTHROCYTE [DISTWIDTH] IN BLOOD BY AUTOMATED COUNT: 15.2 % (ref 10–15)
GFR SERPL CREATININE-BSD FRML MDRD: 85 ML/MIN/{1.73_M2}
GLUCOSE SERPL-MCNC: 100 MG/DL (ref 70–99)
HCT VFR BLD AUTO: 37.2 % (ref 35–47)
HGB BLD-MCNC: 11.5 G/DL (ref 11.7–15.7)
IMM GRANULOCYTES # BLD: 0 10E9/L (ref 0–0.4)
IMM GRANULOCYTES NFR BLD: 0.1 %
LDH SERPL L TO P-CCNC: 178 U/L (ref 81–234)
LYMPHOCYTES # BLD AUTO: 0.5 10E9/L (ref 0.8–5.3)
LYMPHOCYTES NFR BLD AUTO: 7.2 %
MCH RBC QN AUTO: 26.6 PG (ref 26.5–33)
MCHC RBC AUTO-ENTMCNC: 30.9 G/DL (ref 31.5–36.5)
MCV RBC AUTO: 86 FL (ref 78–100)
MONOCYTES # BLD AUTO: 0.5 10E9/L (ref 0–1.3)
MONOCYTES NFR BLD AUTO: 6.3 %
NEUTROPHILS # BLD AUTO: 6.2 10E9/L (ref 1.6–8.3)
NEUTROPHILS NFR BLD AUTO: 85.2 %
PLATELET # BLD AUTO: 253 10E9/L (ref 150–450)
POTASSIUM SERPL-SCNC: 4.2 MMOL/L (ref 3.4–5.3)
PROT SERPL-MCNC: 6.4 G/DL (ref 6.8–8.8)
RBC # BLD AUTO: 4.33 10E12/L (ref 3.8–5.2)
SODIUM SERPL-SCNC: 142 MMOL/L (ref 133–144)
URATE SERPL-MCNC: 3.7 MG/DL (ref 2.6–6)
WBC # BLD AUTO: 7.3 10E9/L (ref 4–11)

## 2019-03-21 PROCEDURE — 80053 COMPREHEN METABOLIC PANEL: CPT | Performed by: INTERNAL MEDICINE

## 2019-03-21 PROCEDURE — 36415 COLL VENOUS BLD VENIPUNCTURE: CPT | Performed by: INTERNAL MEDICINE

## 2019-03-21 PROCEDURE — 85025 COMPLETE CBC W/AUTO DIFF WBC: CPT | Performed by: INTERNAL MEDICINE

## 2019-03-21 PROCEDURE — 84550 ASSAY OF BLOOD/URIC ACID: CPT | Performed by: INTERNAL MEDICINE

## 2019-03-21 PROCEDURE — 99214 OFFICE O/P EST MOD 30 MIN: CPT | Performed by: INTERNAL MEDICINE

## 2019-03-21 PROCEDURE — 83615 LACTATE (LD) (LDH) ENZYME: CPT | Performed by: INTERNAL MEDICINE

## 2019-03-21 ASSESSMENT — PAIN SCALES - GENERAL: PAINLEVEL: MODERATE PAIN (4)

## 2019-03-21 ASSESSMENT — MIFFLIN-ST. JEOR: SCORE: 1197.67

## 2019-03-21 NOTE — LETTER
3/21/2019         RE: Randi Garcia  [address] [facility] [address]        Dear Colleague,    Thank you for referring your patient, Randi Garcia, to the [facility]. Please see a copy of my visit note below.    Oncology Follow-up visit:  Date on this visit: Mar 21, 2019      Primary Care Physician: Dr. Balwinder Hutton, [place], [place]    Rheumatology team: Dr. Jevon Moreno, [place]      Dx:  Diffuse large B cell lymphoma    Oncologic History:  She woke up on 3/19/2015 with R chest wall bump. CT chest with contrast on 3/31/2015 showed a soft tissue lesion inseparable from the right costosternal junction which measured 6.1x6.6cm. There was no associated mediastinal, hilar or axillary lymphadenopathy. She underwent an US and a biopsy of the chest wall lesion and pathology showed findings c/w Diffuse large B cell lymphoma, germinal center type large B-cell lymphoma. The lymphoma cells were CD20+, BCL-6+, and CD23+. BCL-2 demonstrated only scattered weak equivocal staining. There was no significant staining for CD30, CD10, MUM-1, or CD15 is noted in the cells of interest. Ki-67 demonstrated a high proliferation rate estimated at 90% of the tumor cells. Chromogenic in situ  hybridization for EBV (VÍCTOR) was  negative in the lymphomatous cells.  Bone marrow biopsy performed on 4/8/2015 showed no morphologic evidence of lymphoma. There was no immunophenotypic evidence of lymphoma.  She saw Dr. Benavidez with hematology/oncology in [facility] on 4/8/2015 and was recommended to enroll in a clinical trial, S[facility]G  where patients with early stage DLBCL are given RCHOPx3 followed by PET ct scan and it the PET is positive, she would get involved field radiation and Zevalin and if the PET is negaitve she would get an additional cycle of RCHOP.   She sought a second opinion with us.   PET/CT of the chest, abdomen and pelvis on 4/10/2015 showed multiple  hypermetabolic soft tissue nodules in the right anterior chest wall as well as a hypermetabolic right supraclavicular lymph node (hypermetabolic, 1.2 x 1.6 cm).  There was a 3.0 x 5.1 cm soft tissue mass at the right second sternocostal junction is hypermetabolic with a maximum SUV of 15.3 and a 0.8 x 2.0 cm soft tissue nodule between the third and fourth ribs anteriorly which was mildly hypermetabolic with a maximum SUV of 6.9. 0.7 x 1.9 and 1.4 x 2.4 cm soft tissue nodules at the fourth sternocostal junction have a maximal  SUV of 12.9 and 7.1, respectively. Additionally, there was a  2 mm right upper lobe pulmonary nodule.    Given extranodal disease, she had an LP performed at [facility] on 4/13/2015. 4cc clear CSF removed. 2 cc sent for flow cytometry. Cytology showed rare lymphocytes that showed smooth nuclear contours. Flow cytometry did not show a monoclonal B cell population.      She proceeded with cycle 1 of R-CHOP on 4/20/2015, with Neulasta support. She has received 3 cycles so far, last on 6/2/2015.  She has tolerated chemotherapy very well and her R chest wall mass has almost entirely disappeared over the course of the first 3-4 days of the first cycle.   She underwent PET/CT scan on 6/20/2015 which showed complete radiographic response to treatment.  She has completed 3000cGY to R anterior CW from 7/2/2015-7/22/2015 under direction of Dr. Foss in [place].    She was [facility] to have a mildly [facility] 4[facility].    Echocardiogram 4/10/2015:  Left Ventricle EF of 60-65%. RV function normal. Mild left atrial enlargement.      History Of Present Illness:  Ms. Garcia is a 73 year old female who presents for follow-up of treated Diffuse large B cell lymphoma, as above.  She moved to [place] but continues to visit family in [facility] and continues her f/up with us. She has been seeing Dr. Jevon Moreno, in [place], [place]. She is on  prednisone for RA and remains  on 5 mg PO daily but was not able to completely wean off prednisone. .She has received two doses of Rituxan in October 2017 but not since. She is not on MTX. Her rheumatologist offered her to try Otezla but she was reluctant to try a new agent.  Her 3 D screening mammogram was negative on 9/12/2018.  At our last visit in 09/2018 she c/o significant unintentional weight loss which led to an evaluation with CT of the chest, abdomen and pelvis on 9/17/2018 which showed low density thyroid nodules similar to prior. She was recommended to proceed with thyroid US (she was going to f/up on those locally in [place]). We have again reviewed recommendations to follow up on thyroid nodules with US today. She will bring that to the attention of her PCP in [place] and would like to f/up there. On that CT study from 9/12/20018 there was no evidence of recurrent lymphoma in the chest, abdomen, or pelvis. There was a  2.4 cm benign-appearing lucent lesion in the left femoral neck. Pt was seen by orthopedist Dr. Leroy Holbrook in [place] on 9/28/2018. She had no L hip pain and L hip xrays showed a multifocal cystic looking lesion of the superior part of L femoral neck that was well circumscribed and nonconcerning appearing. There was felt to be no compromise to structural integrity and no f/up was felt to be needed.  Also, at our last visit she was noted to be mildly anemic. Her anemia workup was negative except for low Fe% of 7, low Fe of 18, low normal TIBC of 270 and ferritin of 163. Serum folate, TSH and B12 levels were normal. peripheral blood smear showed slight normochromic, normocytic anemia, no increase in erythrocyte regeneration and no e/o hemolysis.   Anemia was felt to be related to anemia of chronic disease (RA). The patient was recommended to try ferrous sulfate 325mg PO QOD.  She was on iron for a few weeks but then discontinued it and does not want to resume.  Her hemoglobin has improved to over 11 g/dL, as below.   She is feeling well and has no health related concerns except for chronic joint pains which she rates at 4/10 and uses Tylenol prn.  In addition, a complete 12 point  review of systems is negative.    Past Medical/Surgical History:  Past Medical History:   Diagnosis Date     Chronic pain      Psoriasis      Rheumatoid arthritis(714.0)      Steroid long-term use      Past Surgical History:   Procedure Laterality Date     ARTHROPLASTY KNEE  9/20/2011    Procedure:ARTHROPLASTY KNEE; Left Total Knee Replacement ; Surgeon:BLAYNE GAYTAN; Location:[facility]     ARTHROSCOPY KNEE BILATERAL       EXTRACAPSULAR CATARACT EXTRATION WITH INTRAOCULAR LENS IMPLANT      right eye 1/2011     PHACOEMULSIFICATION CLEAR CORNEA WITH STANDARD INTRAOCULAR LENS IMPLANT  4/16/2013    Procedure: PHACOEMULSIFICATION CLEAR CORNEA WITH STANDARD INTRAOCULAR LENS IMPLANT;  LEFT EYE PHACOEMULSIFICATION CLEAR CORNEA WITH STANDARD INTRAOCULAR LENS IMPLANT ;  Surgeon: Tawanda Sanchez MD;  Location: [facility]     SURGICAL HISTORY OF -   1/19/2011    Cataract, right eye     SYNOVECTOMY HIP      not hip/ bilateral hands   She saw Dr. Sanchez in 2015. and had a L groin lesion biopsied and it was c/w psoriasis on Bx and she will be following up with him.   As far as RA, she was diagnosed in 2000, had focally erosive disease and has been on Enbrel and Remicade in the past. Prior to Dx of DLBCL she was on Humira and MTX.   FHx and Social Hx reviewed.    Allergies:  Allergies as of 03/21/2019 - Reviewed 09/14/2018   Allergen Reaction Noted     Codeine Nausea and Vomiting 01/31/2006     Ibuprofen Itching 07/06/2005     Pneumovax [pneumococcal polysaccharides] Other (See Comments) 08/07/2017     Sulfa drugs Hives 01/31/2006     Current Medications:  Current Outpatient Medications   Medication Sig Dispense Refill     acetaminophen (TYLENOL) 325 MG tablet Take 2 tablets by mouth 3 times daily as needed. 60 tablet 0     albuterol (PROAIR HFA/PROVENTIL HFA/VENTOLIN  HFA) 108 (90 Base) MCG/ACT inhaler Inhale 1 puff into the lungs 3 times daily as needed for shortness of breath / dyspnea or wheezing       amoxicillin (AMOXIL) 500 MG capsule Take 4 capsules (2 grams), by mouth, 1 hour before dental work. 8 capsule 1     aspirin 81 MG tablet Take 1 tablet by mouth daily.       augmented betamethasone dipropionate (DIPROLENE AF) 0.05 % cream Apply topically 2 times daily 30 g 0     azelaic acid (AZELEX) 20 % cream Apply topically 2 times daily (Patient not taking: Reported on 2/8/2018) 30 g 11     Azelaic Acid (FINACEA) 15 % gel Apply 0.5 inches topically See Admin Instructions massage thin film gently into afected areas morning and evening 150 g 3     benzonatate (TESSALON) 100 MG capsule Take 1 capsule (100 mg) by mouth 3 times daily as needed 42 capsule 1     calcipotriene 0.005 % OINT Can use twice daily to affected areas of psoriasis, in between topical steroid uses. 120 g 1     Calcium Carbonate-Vitamin D (CALCIUM 600+D PO) Take 1,200 mg by mouth daily.       desonide (DESOWEN) 0.05 % ointment Apply topically 2 times daily Apply to affected areas of face twice daily until resolved, then taper to once daily for one week. 60 g 1     hydroxychloroquine (PLAQUENIL) 200 MG tablet Take 2 tablets (400 mg) by mouth daily 180 tablet 0     ketoconazole (NIZORAL) 2 % cream Apply topically 2 times daily 60 g 1     ORDER FOR DME Equipment being ordered: cranial prosthesis.  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 2 Units 0     ORDER FOR DME A pair of foot orthotics. 1 Package 0     predniSONE (DELTASONE) 5 MG tablet Take 4 tablets (20 mg) daily for 2 weeks, then take 2 tablets (10 mg) daily for 2 weeks, then back to 5 mg daily. (Patient not taking: Reported on 9/14/2018) 98 tablet 0     predniSONE (DELTASONE) 5 MG tablet Take 1 tablet (5 mg) by mouth daily 90 tablet 0     triamcinolone (KENALOG) 0.1 % ointment Apply to affected area twice daily for two weeks  "at a time. (Patient not taking: Reported on 9/14/2018) 30 g 2        Physical Exam:    /84 (BP Location: Right arm)   Pulse 84   Temp 98.3  F (36.8  C) (Oral)   Resp 16   Ht 1.575 m (5' 2.01\")   Wt 74.9 kg (165 lb 3 oz)   SpO2 98%   BMI 30.21 kg/m           GENERAL APPEARANCE: healthy, alert and no distress     HENT: Mouth without ulcers or lesions. No throat erythema.     NECK: no adenopathy, no asymmetry or masses     LYMPHATICS: No cervical, supraclavicular, axillary lymphadenopathy b/l     RESP: lungs clear to auscultation - no rales, rhonchi or wheezes     CARDIOVASCULAR: regular rates and rhythm, normal S1 S2, no S3 or S4 and no murmur.     ABDOMEN:  soft, nontender, no HSM or masses and bowel sounds normal     MUSCULOSKELETAL: extremities normal- no gross deformities noted, no evidence of inflammation in joints, FROM in all extremities. No edema b/l LE.  Chest: No right chest wall masses. Incision healed well.      SKIN: no suspicious lesions or rashes     PSYCHIATRIC: mentation appears normal and affect normal  Breast: No axillary lymphadenopathy b/l.    Laboratory/Imaging Studies  Orders Only on 03/21/2019   Component Date Value Ref Range Status     Uric Acid 03/21/2019 3.7  2.6 - 6.0 mg/dL Final     Lactate Dehydrogenase 03/21/2019 178  81 - 234 U/L Final     Sodium 03/21/2019 142  133 - 144 mmol/L Final     Potassium 03/21/2019 4.2  3.4 - 5.3 mmol/L Final     Chloride 03/21/2019 110* 94 - 109 mmol/L Final     Carbon Dioxide 03/21/2019 27  20 - 32 mmol/L Final     Anion Gap 03/21/2019 5  3 - 14 mmol/L Final     Glucose 03/21/2019 100* 70 - 99 mg/dL Final    Non Fasting     Urea Nitrogen 03/21/2019 21  7 - 30 mg/dL Final     Creatinine 03/21/2019 0.68  0.52 - 1.04 mg/dL Final     GFR Estimate 03/21/2019 85  >60 mL/min/[1.73_m2] Final    Comment: Non  GFR Calc  Starting 12/18/2018, serum creatinine based estimated GFR (eGFR) will be   calculated using the Chronic Kidney Disease " Epidemiology Collaboration   (CKD-EPI) equation.       GFR Estimate If Black 03/21/2019 >90  >60 mL/min/[1.73_m2] Final    Comment:  GFR Calc  Starting 12/18/2018, serum creatinine based estimated GFR (eGFR) will be   calculated using the Chronic Kidney Disease Epidemiology Collaboration   (CKD-EPI) equation.       Calcium 03/21/2019 9.0  8.5 - 10.1 mg/dL Final     Bilirubin Total 03/21/2019 0.2  0.2 - 1.3 mg/dL Final     Albumin 03/21/2019 3.4  3.4 - 5.0 g/dL Final     Protein Total 03/21/2019 6.4* 6.8 - 8.8 g/dL Final     Alkaline Phosphatase 03/21/2019 75  40 - 150 U/L Final     ALT 03/21/2019 23  0 - 50 U/L Final     AST 03/21/2019 15  0 - 45 U/L Final     WBC 03/21/2019 7.3  4.0 - 11.0 10e9/L Final     RBC Count 03/21/2019 4.33  3.8 - 5.2 10e12/L Final     Hemoglobin 03/21/2019 11.5* 11.7 - 15.7 g/dL Final     Hematocrit 03/21/2019 37.2  35.0 - 47.0 % Final     MCV 03/21/2019 86  78 - 100 fl Final     MCH 03/21/2019 26.6  26.5 - 33.0 pg Final     MCHC 03/21/2019 30.9* 31.5 - 36.5 g/dL Final     RDW 03/21/2019 15.2* 10.0 - 15.0 % Final     Platelet Count 03/21/2019 253  150 - 450 10e9/L Final     Diff Method 03/21/2019 Automated Method   Final     % Neutrophils 03/21/2019 85.2  % Final     % Lymphocytes 03/21/2019 7.2  % Final     % Monocytes 03/21/2019 6.3  % Final     % Eosinophils 03/21/2019 0.8  % Final     % Basophils 03/21/2019 0.4  % Final     % Immature Granulocytes 03/21/2019 0.1  % Final     Absolute Neutrophil 03/21/2019 6.2  1.6 - 8.3 10e9/L Final     Absolute Lymphocytes 03/21/2019 0.5* 0.8 - 5.3 10e9/L Final     Absolute Monocytes 03/21/2019 0.5  0.0 - 1.3 10e9/L Final     Absolute Eosinophils 03/21/2019 0.1  0.0 - 0.7 10e9/L Final     Absolute Basophils 03/21/2019 0.0  0.0 - 0.2 10e9/L Final     Abs Immature Granulocytes 03/21/2019 0.0  0 - 0.4 10e9/L Final       ASSESSMENT/PLAN:  The patient is a very pleasant 73 year old woman with stage IIAE Diffuse large B cell lymphoma  involving right anterior chest wall, right supraclavicular LN and eroding into sternum. She falls in the low-intermediate risk IPI group when not adjusted for age, with IPI score of 2 which with associated with 67% CR rate with treatment and 51% 5 year overall survival rate. If adjusted for age, she would fall into low risk or IPI of 0 score, associated with CR rate of 91% and 56% 5 year overall survival.  She has had an excellent clinical response after one cycle of RCHOP and is in complete radiographic CR after 3 cycles of  R-CHOP, last on 6/2/2015. She has completed 3000cGY to R anterior CW from 7/2/2015-7/22/2015 under direction of Dr. Foss in [place].  She had no e/o disease recurrence on her last CT chest form 9/2018.    1. Diffuse large B cell lymphoma -  She has no e/o disease recurrence clinically. F/up of stage II Diffuse large B cell lymphoma in years 4 and 5 includes H&P and labs every 3-6 months for 5 years, then annually and imaging as clinically indicated.  Her 5-year ruth will be in July 2020.  Until then she would like to keep seeing us every 6 months, and will plan to see her in September, with labs.    2. Breast Cancer Screening - b/l screening mammogram negative 9/2018. F/up in one year.  She will schedule with our next visit.  3. Colon Cancer Screening - colonoscopy on 4/16/2015. The exam was normal and repeat was recommended in 5 years for screening purposes.    4. RA - currently off MTX and on low dose  Prednisone. She is s/p Rituxan weekly x 2 in October 2017. F/up with Dr. Montilla in  [place].     5. Psoriasis (n/a today)- f/up with dermatology. She saw Dr. Sanchez in  in the past.    At the end of our visit patient verbalized understanding and concurred with the plan.          Again, thank you for allowing me to participate in the care of your patient.        Sincerely,        Sue Garcia MD, MD

## 2019-03-21 NOTE — PROGRESS NOTES
Oncology Follow-up visit:  Date on this visit: Mar 21, 2019      Primary Care Physician: Dr. Balwinder Hutton, [place], [place]    Rheumatology team: Dr. Jevon Moreno [place]      Dx:  Diffuse large B cell lymphoma    Oncologic History:  She woke up on 3/19/2015 with R chest wall bump. CT chest with contrast on 3/31/2015 showed a soft tissue lesion inseparable from the right costosternal junction which measured 6.1x6.6cm. There was no associated mediastinal, hilar or axillary lymphadenopathy. She underwent an US and a biopsy of the chest wall lesion and pathology showed findings c/w Diffuse large B cell lymphoma, germinal center type large B-cell lymphoma. The lymphoma cells were CD20+, BCL-6+, and CD23+. BCL-2 demonstrated only scattered weak equivocal staining. There was no significant staining for CD30, CD10, MUM-1, or CD15 is noted in the cells of interest. Ki-67 demonstrated a high proliferation rate estimated at 90% of the tumor cells. Chromogenic in situ  hybridization for EBV (VÍCTOR) was  negative in the lymphomatous cells.  Bone marrow biopsy performed on 4/8/2015 showed no morphologic evidence of lymphoma. There was no immunophenotypic evidence of lymphoma.  She saw Dr. Benavidez with hematology/oncology in [facility] on 4/8/2015 and was recommended to enroll in a clinical trial, S[facility]  where patients with early stage DLBCL are given RCHOPx3 followed by PET ct scan and it the PET is positive, she would get involved field radiation and Zevalin and if the PET is negaitve she would get an additional cycle of RCHOP.   She sought a second opinion with us.   PET/CT of the chest, abdomen and pelvis on 4/10/2015 showed multiple hypermetabolic soft tissue nodules in the right anterior chest wall as well as a hypermetabolic right supraclavicular lymph node (hypermetabolic, 1.2 x 1.6 cm).  There was a 3.0 x 5.1 cm soft tissue mass at the right second sternocostal junction is hypermetabolic  with a maximum SUV of 15.3 and a 0.8 x 2.0 cm soft tissue nodule between the third and fourth ribs anteriorly which was mildly hypermetabolic with a maximum SUV of 6.9. 0.7 x 1.9 and 1.4 x 2.4 cm soft tissue nodules at the fourth sternocostal junction have a maximal  SUV of 12.9 and 7.1, respectively. Additionally, there was a  2 mm right upper lobe pulmonary nodule.    Given extranodal disease, she had an LP performed at [facility] on 4/13/2015. 4cc clear CSF removed. 2 cc sent for flow cytometry. Cytology showed rare lymphocytes that showed smooth nuclear contours. Flow cytometry did not show a monoclonal B cell population.      She proceeded with cycle 1 of R-CHOP on 4/20/2015, with Neulasta support. She has received 3 cycles so far, last on 6/2/2015.  She has tolerated chemotherapy very well and her R chest wall mass has almost entirely disappeared over the course of the first 3-4 days of the first cycle.   She underwent PET/CT scan on 6/20/2015 which showed complete radiographic response to treatment.  She has completed 3000cGY to R anterior CW from 7/2/2015-7/22/2015 under direction of Dr. Foss in [place].    She was noted to have a mildly low IgG in [facility] on 4/27/2018.    Echocardiogram 4/10/2015:  Left Ventricle EF of 60-65%. RV function normal. Mild left atrial enlargement.      History Of Present Illness:  Ms. Garcia is a 73 year old female who presents for follow-up of treated Diffuse large B cell lymphoma, as above.  She moved to [place] but continues to visit family in [facility] and continues her f/up with us. She has been seeing Dr. Jevon Moreno, in [place], [place]. She is on  prednisone for RA and remains on 5 mg PO daily but was not able to completely wean off prednisone. .She has received two doses of Rituxan in October 2017 but not since. She is not on MTX. Her rheumatologist offered her to try Otezla but she was reluctant to try a new agent.  Her 3 D screening  mammogram was negative on 9/12/2018.  At our last visit in 09/2018 she c/o significant unintentional weight loss which led to an evaluation with CT of the chest, abdomen and pelvis on 9/17/2018 which showed low density thyroid nodules similar to prior. She was recommended to proceed with thyroid US (she was going to f/up on those locally in [place]). We have again reviewed recommendations to follow up on thyroid nodules with US today. She will bring that to the attention of her PCP in [place] and would like to f/up there. On that CT study from 9/12/20018 there was no evidence of recurrent lymphoma in the chest, abdomen, or pelvis. There was a  2.4 cm benign-appearing lucent lesion in the left femoral neck. Pt was seen by orthopedist Dr. Leroy Holrbook in [place] on 9/28/2018. She had no L hip pain and L hip xrays showed a multifocal cystic looking lesion of the superior part of L femoral neck that was well circumscribed and nonconcerning appearing. There was felt to be no compromise to structural integrity and no f/up was felt to be needed.  Also, at our last visit she was noted to be mildly anemic. Her anemia workup was negative except for low Fe% of 7, low Fe of 18, low normal TIBC of 270 and ferritin of 163. Serum folate, TSH and B12 levels were normal. peripheral blood smear showed slight normochromic, normocytic anemia, no increase in erythrocyte regeneration and no e/o hemolysis.   Anemia was felt to be related to anemia of chronic disease (RA). The patient was recommended to try ferrous sulfate 325mg PO QOD.  She was on iron for a few weeks but then discontinued it and does not want to resume.  Her hemoglobin has improved to over 11 g/dL, as below.  She is feeling well and has no health related concerns except for chronic joint pains which she rates at 4/10 and uses Tylenol prn.  In addition, a complete 12 point  review of systems is negative.    Past Medical/Surgical History:  Past Medical History:    Diagnosis Date     Chronic pain      Psoriasis      Rheumatoid arthritis(714.0)      Steroid long-term use      Past Surgical History:   Procedure Laterality Date     ARTHROPLASTY KNEE  9/20/2011    Procedure:ARTHROPLASTY KNEE; Left Total Knee Replacement ; Surgeon:BLAYNE GAYTAN; Location:[facility]     ARTHROSCOPY KNEE BILATERAL       EXTRACAPSULAR CATARACT EXTRATION WITH INTRAOCULAR LENS IMPLANT      right eye 1/2011     PHACOEMULSIFICATION CLEAR CORNEA WITH STANDARD INTRAOCULAR LENS IMPLANT  4/16/2013    Procedure: PHACOEMULSIFICATION CLEAR CORNEA WITH STANDARD INTRAOCULAR LENS IMPLANT;  LEFT EYE PHACOEMULSIFICATION CLEAR CORNEA WITH STANDARD INTRAOCULAR LENS IMPLANT ;  Surgeon: Tawanda Sanchez MD;  Location: [facility]     SURGICAL HISTORY OF -   1/19/2011    Cataract, right eye     SYNOVECTOMY HIP      not hip/ bilateral hands   She saw Dr. Sanchez in 2015. and had a L groin lesion biopsied and it was c/w psoriasis on Bx and she will be following up with him.   As far as RA, she was diagnosed in 2000, had focally erosive disease and has been on Enbrel and Remicade in the past. Prior to Dx of DLBCL she was on Humira and MTX.   FHx and Social Hx reviewed.    Allergies:  Allergies as of 03/21/2019 - Reviewed 09/14/2018   Allergen Reaction Noted     Codeine Nausea and Vomiting 01/31/2006     Ibuprofen Itching 07/06/2005     Pneumovax [pneumococcal polysaccharides] Other (See Comments) 08/07/2017     Sulfa drugs Hives 01/31/2006     Current Medications:  Current Outpatient Medications   Medication Sig Dispense Refill     acetaminophen (TYLENOL) 325 MG tablet Take 2 tablets by mouth 3 times daily as needed. 60 tablet 0     albuterol (PROAIR HFA/PROVENTIL HFA/VENTOLIN HFA) 108 (90 Base) MCG/ACT inhaler Inhale 1 puff into the lungs 3 times daily as needed for shortness of breath / dyspnea or wheezing       amoxicillin (AMOXIL) 500 MG capsule Take 4 capsules (2 grams), by mouth, 1 hour before dental work. 8 capsule 1      "aspirin 81 MG tablet Take 1 tablet by mouth daily.       augmented betamethasone dipropionate (DIPROLENE AF) 0.05 % cream Apply topically 2 times daily 30 g 0     azelaic acid (AZELEX) 20 % cream Apply topically 2 times daily (Patient not taking: Reported on 2/8/2018) 30 g 11     Azelaic Acid (FINACEA) 15 % gel Apply 0.5 inches topically See Admin Instructions massage thin film gently into afected areas morning and evening 150 g 3     benzonatate (TESSALON) 100 MG capsule Take 1 capsule (100 mg) by mouth 3 times daily as needed 42 capsule 1     calcipotriene 0.005 % OINT Can use twice daily to affected areas of psoriasis, in between topical steroid uses. 120 g 1     Calcium Carbonate-Vitamin D (CALCIUM 600+D PO) Take 1,200 mg by mouth daily.       desonide (DESOWEN) 0.05 % ointment Apply topically 2 times daily Apply to affected areas of face twice daily until resolved, then taper to once daily for one week. 60 g 1     hydroxychloroquine (PLAQUENIL) 200 MG tablet Take 2 tablets (400 mg) by mouth daily 180 tablet 0     ketoconazole (NIZORAL) 2 % cream Apply topically 2 times daily 60 g 1     ORDER FOR DME Equipment being ordered: cranial prosthesis.  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 2 Units 0     ORDER FOR DME A pair of foot orthotics. 1 Package 0     predniSONE (DELTASONE) 5 MG tablet Take 4 tablets (20 mg) daily for 2 weeks, then take 2 tablets (10 mg) daily for 2 weeks, then back to 5 mg daily. (Patient not taking: Reported on 9/14/2018) 98 tablet 0     predniSONE (DELTASONE) 5 MG tablet Take 1 tablet (5 mg) by mouth daily 90 tablet 0     triamcinolone (KENALOG) 0.1 % ointment Apply to affected area twice daily for two weeks at a time. (Patient not taking: Reported on 9/14/2018) 30 g 2        Physical Exam:    /84 (BP Location: Right arm)   Pulse 84   Temp 98.3  F (36.8  C) (Oral)   Resp 16   Ht 1.575 m (5' 2.01\")   Wt 74.9 kg (165 lb 3 oz)   SpO2 98%   BMI 30.21 " kg/m          GENERAL APPEARANCE: healthy, alert and no distress     HENT: Mouth without ulcers or lesions. No throat erythema.     NECK: no adenopathy, no asymmetry or masses     LYMPHATICS: No cervical, supraclavicular, axillary lymphadenopathy b/l     RESP: lungs clear to auscultation - no rales, rhonchi or wheezes     CARDIOVASCULAR: regular rates and rhythm, normal S1 S2, no S3 or S4 and no murmur.     ABDOMEN:  soft, nontender, no HSM or masses and bowel sounds normal     MUSCULOSKELETAL: extremities normal- no gross deformities noted, no evidence of inflammation in joints, FROM in all extremities. No edema b/l LE.  Chest: No right chest wall masses. Incision healed well.      SKIN: no suspicious lesions or rashes     PSYCHIATRIC: mentation appears normal and affect normal  Breast: No axillary lymphadenopathy b/l.    Laboratory/Imaging Studies  Orders Only on 03/21/2019   Component Date Value Ref Range Status     Uric Acid 03/21/2019 3.7  2.6 - 6.0 mg/dL Final     Lactate Dehydrogenase 03/21/2019 178  81 - 234 U/L Final     Sodium 03/21/2019 142  133 - 144 mmol/L Final     Potassium 03/21/2019 4.2  3.4 - 5.3 mmol/L Final     Chloride 03/21/2019 110* 94 - 109 mmol/L Final     Carbon Dioxide 03/21/2019 27  20 - 32 mmol/L Final     Anion Gap 03/21/2019 5  3 - 14 mmol/L Final     Glucose 03/21/2019 100* 70 - 99 mg/dL Final    Non Fasting     Urea Nitrogen 03/21/2019 21  7 - 30 mg/dL Final     Creatinine 03/21/2019 0.68  0.52 - 1.04 mg/dL Final     GFR Estimate 03/21/2019 85  >60 mL/min/[1.73_m2] Final    Comment: Non  GFR Calc  Starting 12/18/2018, serum creatinine based estimated GFR (eGFR) will be   calculated using the Chronic Kidney Disease Epidemiology Collaboration   (CKD-EPI) equation.       GFR Estimate If Black 03/21/2019 >90  >60 mL/min/[1.73_m2] Final    Comment:  GFR Calc  Starting 12/18/2018, serum creatinine based estimated GFR (eGFR) will be   calculated using the  Chronic Kidney Disease Epidemiology Collaboration   (CKD-EPI) equation.       Calcium 03/21/2019 9.0  8.5 - 10.1 mg/dL Final     Bilirubin Total 03/21/2019 0.2  0.2 - 1.3 mg/dL Final     Albumin 03/21/2019 3.4  3.4 - 5.0 g/dL Final     Protein Total 03/21/2019 6.4* 6.8 - 8.8 g/dL Final     Alkaline Phosphatase 03/21/2019 75  40 - 150 U/L Final     ALT 03/21/2019 23  0 - 50 U/L Final     AST 03/21/2019 15  0 - 45 U/L Final     WBC 03/21/2019 7.3  4.0 - 11.0 10e9/L Final     RBC Count 03/21/2019 4.33  3.8 - 5.2 10e12/L Final     Hemoglobin 03/21/2019 11.5* 11.7 - 15.7 g/dL Final     Hematocrit 03/21/2019 37.2  35.0 - 47.0 % Final     MCV 03/21/2019 86  78 - 100 fl Final     MCH 03/21/2019 26.6  26.5 - 33.0 pg Final     MCHC 03/21/2019 30.9* 31.5 - 36.5 g/dL Final     RDW 03/21/2019 15.2* 10.0 - 15.0 % Final     Platelet Count 03/21/2019 253  150 - 450 10e9/L Final     Diff Method 03/21/2019 Automated Method   Final     % Neutrophils 03/21/2019 85.2  % Final     % Lymphocytes 03/21/2019 7.2  % Final     % Monocytes 03/21/2019 6.3  % Final     % Eosinophils 03/21/2019 0.8  % Final     % Basophils 03/21/2019 0.4  % Final     % Immature Granulocytes 03/21/2019 0.1  % Final     Absolute Neutrophil 03/21/2019 6.2  1.6 - 8.3 10e9/L Final     Absolute Lymphocytes 03/21/2019 0.5* 0.8 - 5.3 10e9/L Final     Absolute Monocytes 03/21/2019 0.5  0.0 - 1.3 10e9/L Final     Absolute Eosinophils 03/21/2019 0.1  0.0 - 0.7 10e9/L Final     Absolute Basophils 03/21/2019 0.0  0.0 - 0.2 10e9/L Final     Abs Immature Granulocytes 03/21/2019 0.0  0 - 0.4 10e9/L Final       ASSESSMENT/PLAN:  The patient is a very pleasant 73 year old woman with stage IIAE Diffuse large B cell lymphoma involving right anterior chest wall, right supraclavicular LN and eroding into sternum. She falls in the low-intermediate risk IPI group when not adjusted for age, with IPI score of 2 which with associated with 67% CR rate with treatment and 51% 5 year overall  survival rate. If adjusted for age, she would fall into low risk or IPI of 0 score, associated with CR rate of 91% and 56% 5 year overall survival.  She has had an excellent clinical response after one cycle of RCHOP and is in complete radiographic CR after 3 cycles of  R-CHOP, last on 6/2/2015. She has completed 3000cGY to R anterior CW from 7/2/2015-7/22/2015 under direction of Dr. Foss in [place].  She had no e/o disease recurrence on her last CT chest form 9/2018.    1. Diffuse large B cell lymphoma -  She has no e/o disease recurrence clinically. F/up of stage II Diffuse large B cell lymphoma in years 4 and 5 includes H&P and labs every 3-6 months for 5 years, then annually and imaging as clinically indicated.  Her 5-year ruth will be in July 2020.  Until then she would like to keep seeing us every 6 months, and will plan to see her in September, with labs.    2. Breast Cancer Screening - b/l screening mammogram negative 9/2018. F/up in one year.  She will schedule with our next visit.  3. Colon Cancer Screening - colonoscopy on 4/16/2015. The exam was normal and repeat was recommended in 5 years for screening purposes.    4. RA - currently off MTX and on low dose  Prednisone. She is s/p Rituxan weekly x 2 in October 2017. F/up with Dr. Montilla in  [place].     5. Psoriasis (n/a today)- f/up with dermatology. She saw Dr. Sanchze in  in the past.    At the end of our visit patient verbalized understanding and concurred with the plan.

## 2019-03-21 NOTE — NURSING NOTE
"Oncology Rooming Note    March 21, 2019 4:03 PM   Randi Garcia is a 73 year old female who presents for:    Chief Complaint   Patient presents with     Oncology Clinic Visit     Follow up     Initial Vitals: /84 (BP Location: Right arm)   Pulse 84   Temp 98.3  F (36.8  C) (Oral)   Resp 16   Ht 1.575 m (5' 2.01\")   Wt 74.9 kg (165 lb 3 oz)   SpO2 98%   BMI 30.21 kg/m   Estimated body mass index is 30.21 kg/m  as calculated from the following:    Height as of this encounter: 1.575 m (5' 2.01\").    Weight as of this encounter: 74.9 kg (165 lb 3 oz). Body surface area is 1.81 meters squared.  Moderate Pain (4) Comment: Data Unavailable   No LMP recorded. Patient is postmenopausal.  Allergies reviewed: Yes  Medications reviewed: Yes    Medications: Medication refills not needed today.  Pharmacy name entered into EPIC:    [facility]  [facility] - [facility], [place] - [address] [place] [address] [facility]CRIPT  [facility] - [place], [place] - [address] [place] [id number] - [place], [place] - [address] [facility] [place] [address] [place] [id number] - [place], [place] - [address] [id number] - [place], [place] - [address] [id number] - [place] - [id number] [place] [address]      Georgie Simms LPN            "

## 2019-07-29 DIAGNOSIS — L71.9 ROSACEA: ICD-10-CM

## 2019-07-30 NOTE — TELEPHONE ENCOUNTER
Pharmacy called-refill denied patient needs to make appointment.  Message was sent to clinic scheduler.

## 2019-08-14 ENCOUNTER — MYC MEDICAL ADVICE (OUTPATIENT)
Dept: CARE COORDINATION | Facility: CLINIC | Age: 74
End: 2019-08-14

## 2019-08-14 ENCOUNTER — DOCUMENTATION ONLY (OUTPATIENT)
Dept: CARE COORDINATION | Facility: CLINIC | Age: 74
End: 2019-08-14

## 2019-09-05 DIAGNOSIS — Z96.659 S/P TKR (TOTAL KNEE REPLACEMENT): Primary | ICD-10-CM

## 2019-09-05 RX ORDER — AMOXICILLIN 500 MG/1
TABLET, FILM COATED ORAL
Qty: 12 TABLET | Refills: 1 | Status: SHIPPED | OUTPATIENT
Start: 2019-09-05 | End: 2022-09-28

## 2019-09-13 ENCOUNTER — OFFICE VISIT (OUTPATIENT)
Dept: DERMATOLOGY | Facility: CLINIC | Age: 74
End: 2019-09-13
Payer: COMMERCIAL

## 2019-09-13 ENCOUNTER — ANCILLARY PROCEDURE (OUTPATIENT)
Dept: MAMMOGRAPHY | Facility: CLINIC | Age: 74
End: 2019-09-13
Attending: INTERNAL MEDICINE
Payer: COMMERCIAL

## 2019-09-13 DIAGNOSIS — L71.9 ROSACEA: Primary | ICD-10-CM

## 2019-09-13 DIAGNOSIS — Z12.31 VISIT FOR SCREENING MAMMOGRAM: ICD-10-CM

## 2019-09-13 PROCEDURE — 77067 SCR MAMMO BI INCL CAD: CPT | Performed by: RADIOLOGY

## 2019-09-13 PROCEDURE — 77063 BREAST TOMOSYNTHESIS BI: CPT | Performed by: RADIOLOGY

## 2019-09-13 ASSESSMENT — PAIN SCALES - GENERAL: PAINLEVEL: MODERATE PAIN (5)

## 2019-09-13 NOTE — LETTER
9/13/2019       RE: Randi Garcia  [address] [facility] [address]     Dear Colleague,    Thank you for referring your patient, Randi Garcia, to the [facility] DERMATOLOGY at [facility]. Please see a copy of my visit note below.    [facility] Dermatology Note      Dermatology Problem List:  1. Rosacea- using azelaic acid 20% bid.   2. Seborrheic dermatitis- using desonide ointment bid prn  3. Inverse psoriasis- resolved  4. History of B cell lymphoma- s/p chemo (R-CHOP) and radiation to the chest wall    Encounter Date: Sep 13, 2019    CC:  Chief Complaint   Patient presents with     Derm Problem     Estefanimarinoa, is being seen today for a follow up Rosacea, no real change since last visit, as reported by patient.         History of Present Illness:  Ms. Randi Garcia is a 73 year old female who presents as a follow-up for rosacea. The patient was last seen on 01/13/2017 where her azelaic acid was increased from 15% to 20%. Since increase the strength she has been experiencing less breakthrough redness. Overall she is pleased with this change and denies burning or irritation with medication application. She denies having any papules or ocular symptoms. Since she was last seen she has been healthy and denies any new medical conditions or medications. She has no other skin concerns.     Past Medical History:   Patient Active Problem List   Diagnosis     HYPERLIPIDEMIA LDL GOAL <160     Generalized osteoarthritis of multiple sites     Advanced directives, counseling/discussion     High risk medications (not anticoagulants) long-term use     Atrophic vaginitis     Encounter for long-term current use of medication     Skin exam, screening for cancer     DLBCL (diffuse large B cell lymphoma) (H)     Pulmonary nodules     Inverse psoriasis     Long-term use of immunosuppressant medication     Rheumatoid arthritis of multiple sites with  negative rheumatoid factor (H)     Drug-induced neutropenia (H)     Past Medical History:   Diagnosis Date     Chronic pain      Psoriasis      Rheumatoid arthritis(714.0)      Steroid long-term use      Past Surgical History:   Procedure Laterality Date     ARTHROPLASTY KNEE  9/20/2011    Procedure:ARTHROPLASTY KNEE; Left Total Knee Replacement ; Surgeon:BLAYNE GAYTAN; Location:[facility]     ARTHROSCOPY KNEE BILATERAL       EXTRACAPSULAR CATARACT EXTRATION WITH INTRAOCULAR LENS IMPLANT      right eye 1/2011     PHACOEMULSIFICATION CLEAR CORNEA WITH STANDARD INTRAOCULAR LENS IMPLANT  4/16/2013    Procedure: PHACOEMULSIFICATION CLEAR CORNEA WITH STANDARD INTRAOCULAR LENS IMPLANT;  LEFT EYE PHACOEMULSIFICATION CLEAR CORNEA WITH STANDARD INTRAOCULAR LENS IMPLANT ;  Surgeon: Tawanda Sanchez MD;  Location: [facility]     SURGICAL HISTORY OF -   1/19/2011    Cataract, right eye     SYNOVECTOMY HIP      not hip/ bilateral hands       Social History:  Patient reports that she quit smoking about 52 years ago. She has never used smokeless tobacco. She reports that she does not drink alcohol or use drugs.    Family History:  Family History   Problem Relation Age of Onset     Respiratory Mother      C.A.D. Maternal Grandmother      Cancer Sister         Brain tumor     Heart Disease Brother      Respiratory Brother      Cancer Sister         stomach/bowels     Asthma No family hx of      Diabetes No family hx of      Hypertension No family hx of      Cerebrovascular Disease No family hx of      Breast Cancer No family hx of      Cancer - colorectal No family hx of      Prostate Cancer No family hx of      Melanoma No family hx of      Skin Cancer No family hx of        Medications:  Current Outpatient Medications   Medication Sig Dispense Refill     acetaminophen (TYLENOL) 325 MG tablet Take 2 tablets by mouth 3 times daily as needed. 60 tablet 0     albuterol (PROAIR HFA/PROVENTIL HFA/VENTOLIN HFA) 108 (90 Base) MCG/ACT inhaler  Inhale 1 puff into the lungs 3 times daily as needed for shortness of breath / dyspnea or wheezing       amoxicillin (AMOXIL) 500 MG capsule Take 4 capsules (2 grams), by mouth, 1 hour before dental work. 8 capsule 1     amoxicillin (AMOXIL) 500 MG tablet Take 2 grams (4 tabs) by mouth one hour before dental appointment 12 tablet 1     aspirin 81 MG tablet Take 1 tablet by mouth daily.       augmented betamethasone dipropionate (DIPROLENE AF) 0.05 % cream Apply topically 2 times daily 30 g 0     azelaic acid (AZELEX) 20 % cream Apply topically 2 times daily 30 g 11     Azelaic Acid (FINACEA) 15 % gel Apply 0.5 inches topically See Admin Instructions massage thin film gently into afected areas morning and evening 150 g 3     benzonatate (TESSALON) 100 MG capsule Take 1 capsule (100 mg) by mouth 3 times daily as needed 42 capsule 1     calcipotriene 0.005 % OINT Can use twice daily to affected areas of psoriasis, in between topical steroid uses. 120 g 1     Calcium Carbonate-Vitamin D (CALCIUM 600+D PO) Take 1,200 mg by mouth daily.       desonide (DESOWEN) 0.05 % ointment Apply topically 2 times daily Apply to affected areas of face twice daily until resolved, then taper to once daily for one week. 60 g 1     hydroxychloroquine (PLAQUENIL) 200 MG tablet Take 2 tablets (400 mg) by mouth daily 180 tablet 0     ketoconazole (NIZORAL) 2 % cream Apply topically 2 times daily 60 g 1     predniSONE (DELTASONE) 5 MG tablet Take 4 tablets (20 mg) daily for 2 weeks, then take 2 tablets (10 mg) daily for 2 weeks, then back to 5 mg daily. 98 tablet 0     predniSONE (DELTASONE) 5 MG tablet Take 1 tablet (5 mg) by mouth daily 90 tablet 0     triamcinolone (KENALOG) 0.1 % ointment Apply to affected area twice daily for two weeks at a time. 30 g 2     ORDER FOR DME Equipment being ordered: cranial prosthesis.  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 (Patient not taking: Reported on 3/21/2019) 2 Units 0      ORDER FOR DME A pair of foot orthotics. (Patient not taking: Reported on 3/21/2019) 1 Package 0     Allergies   Allergen Reactions     Codeine Nausea and Vomiting     Ibuprofen Itching     Pneumovax [Pneumococcal Polysaccharides] Other (See Comments)     Pain in the arm and difficulty moving the arm where injection was received     Sulfa Drugs Hives         Review of Systems:  -Skin Establ Pt: The patient denies any new rash, pruritus, or lesions that are symptomatic, changing or bleeding, except as per HPI.  -Constitutional: Otherwise feeling well today, in usual state of health.  -HEENT: Patient denies nonhealing oral sores.  -Skin: As above in HPI. No additional skin concerns.    Physical exam:  Vitals: There were no vitals taken for this visit.  GEN: This is a well developed, well-nourished female in no acute distress, in a pleasant mood.    SKIN: Focused examination of the face was performed.  - Montes skin type: II  - Mild pink erythema with few scattered telangiectasias on bilateral cheeks.   - No papules or pustules.   - No other lesions of concern on areas examined.     Impression/Plan:  1. Rosacea: Currently under good control with mild erythema and few telangiectasias. She is overall pleased with her current regimen.    Continue azelaic acid 20% cream, apply twice daily    [facility]  [address] [place] [address] on close of this encounter.  Follow-up in 1 years, earlier for new or changing lesions.     Staff Involved:  I, Katja Oglesby, MS4, saw and examined the patient in the presence of Dr. Sanchez.       Staff Physician Comments:   I saw and evaluated the patient with the medical student and I agree with the assessment and plan.  I was present for the examination..    Manuel Sanchez MD  Adjunct Clinical Professor  Department of Dermatology  [facility]: Phone: [phone], Fax:  [phone]

## 2019-09-13 NOTE — NURSING NOTE
Dermatology Rooming Note    Randi Garcia's goals for this visit include:   Chief Complaint   Patient presents with     Derm Problem     J Carlos, is being seen today for a follow up Rosacea, no real change since last visit, as reported by patient.     Mitali Holbrook LPN

## 2019-09-13 NOTE — PROGRESS NOTES
[facility] Dermatology Note      Dermatology Problem List:  1. Rosacea- using azelaic acid 20% bid.   2. Seborrheic dermatitis- using desonide ointment bid prn  3. Inverse psoriasis- resolved  4. History of B cell lymphoma- s/p chemo (R-CHOP) and radiation to the chest wall    Encounter Date: Sep 13, 2019    CC:  Chief Complaint   Patient presents with     Derm Problem     J Carlos, is being seen today for a follow up Rosacea, no real change since last visit, as reported by patient.         History of Present Illness:  Ms. Randi Garcia is a 73 year old female who presents as a follow-up for rosacea. The patient was last seen on 01/13/2017 where her azelaic acid was increased from 15% to 20%. Since increase the strength she has been experiencing less breakthrough redness. Overall she is pleased with this change and denies burning or irritation with medication application. She denies having any papules or ocular symptoms. Since she was last seen she has been healthy and denies any new medical conditions or medications. She has no other skin concerns.     Past Medical History:   Patient Active Problem List   Diagnosis     HYPERLIPIDEMIA LDL GOAL <160     Generalized osteoarthritis of multiple sites     Advanced directives, counseling/discussion     High risk medications (not anticoagulants) long-term use     Atrophic vaginitis     Encounter for long-term current use of medication     Skin exam, screening for cancer     DLBCL (diffuse large B cell lymphoma) (H)     Pulmonary nodules     Inverse psoriasis     Long-term use of immunosuppressant medication     Rheumatoid arthritis of multiple sites with negative rheumatoid factor (H)     Drug-induced neutropenia (H)     Past Medical History:   Diagnosis Date     Chronic pain      Psoriasis      Rheumatoid arthritis(714.0)      Steroid long-term use      Past Surgical History:   Procedure Laterality Date     ARTHROPLASTY KNEE  9/20/2011     Procedure:ARTHROPLASTY KNEE; Left Total Knee Replacement ; Surgeon:BLAYNE GAYTAN; Location[facility]     ARTHROSCOPY KNEE BILATERAL       EXTRACAPSULAR CATARACT EXTRATION WITH INTRAOCULAR LENS IMPLANT      right eye 1/2011     PHACOEMULSIFICATION CLEAR CORNEA WITH STANDARD INTRAOCULAR LENS IMPLANT  4/16/2013    Procedure: PHACOEMULSIFICATION CLEAR CORNEA WITH STANDARD INTRAOCULAR LENS IMPLANT;  LEFT EYE PHACOEMULSIFICATION CLEAR CORNEA WITH STANDARD INTRAOCULAR LENS IMPLANT ;  Surgeon: Tawanda Sanchez MD;  Location: [facility]     SURGICAL HISTORY OF -   1/19/2011    Cataract, right eye     SYNOVECTOMY HIP      not hip/ bilateral hands       Social History:  Patient reports that she quit smoking about 52 years ago. She has never used smokeless tobacco. She reports that she does not drink alcohol or use drugs.    Family History:  Family History   Problem Relation Age of Onset     Respiratory Mother      C.A.D. Maternal Grandmother      Cancer Sister         Brain tumor     Heart Disease Brother      Respiratory Brother      Cancer Sister         stomach/bowels     Asthma No family hx of      Diabetes No family hx of      Hypertension No family hx of      Cerebrovascular Disease No family hx of      Breast Cancer No family hx of      Cancer - colorectal No family hx of      Prostate Cancer No family hx of      Melanoma No family hx of      Skin Cancer No family hx of        Medications:  Current Outpatient Medications   Medication Sig Dispense Refill     acetaminophen (TYLENOL) 325 MG tablet Take 2 tablets by mouth 3 times daily as needed. 60 tablet 0     albuterol (PROAIR HFA/PROVENTIL HFA/VENTOLIN HFA) 108 (90 Base) MCG/ACT inhaler Inhale 1 puff into the lungs 3 times daily as needed for shortness of breath / dyspnea or wheezing       amoxicillin (AMOXIL) 500 MG capsule Take 4 capsules (2 grams), by mouth, 1 hour before dental work. 8 capsule 1     amoxicillin (AMOXIL) 500 MG tablet Take 2 grams (4 tabs) by mouth one  hour before dental appointment 12 tablet 1     aspirin 81 MG tablet Take 1 tablet by mouth daily.       augmented betamethasone dipropionate (DIPROLENE AF) 0.05 % cream Apply topically 2 times daily 30 g 0     azelaic acid (AZELEX) 20 % cream Apply topically 2 times daily 30 g 11     Azelaic Acid (FINACEA) 15 % gel Apply 0.5 inches topically See Admin Instructions massage thin film gently into afected areas morning and evening 150 g 3     benzonatate (TESSALON) 100 MG capsule Take 1 capsule (100 mg) by mouth 3 times daily as needed 42 capsule 1     calcipotriene 0.005 % OINT Can use twice daily to affected areas of psoriasis, in between topical steroid uses. 120 g 1     Calcium Carbonate-Vitamin D (CALCIUM 600+D PO) Take 1,200 mg by mouth daily.       desonide (DESOWEN) 0.05 % ointment Apply topically 2 times daily Apply to affected areas of face twice daily until resolved, then taper to once daily for one week. 60 g 1     hydroxychloroquine (PLAQUENIL) 200 MG tablet Take 2 tablets (400 mg) by mouth daily 180 tablet 0     ketoconazole (NIZORAL) 2 % cream Apply topically 2 times daily 60 g 1     predniSONE (DELTASONE) 5 MG tablet Take 4 tablets (20 mg) daily for 2 weeks, then take 2 tablets (10 mg) daily for 2 weeks, then back to 5 mg daily. 98 tablet 0     predniSONE (DELTASONE) 5 MG tablet Take 1 tablet (5 mg) by mouth daily 90 tablet 0     triamcinolone (KENALOG) 0.1 % ointment Apply to affected area twice daily for two weeks at a time. 30 g 2     ORDER FOR DME Equipment being ordered: cranial prosthesis.  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 (Patient not taking: Reported on 3/21/2019) 2 Units 0     ORDER FOR DME A pair of foot orthotics. (Patient not taking: Reported on 3/21/2019) 1 Package 0     Allergies   Allergen Reactions     Codeine Nausea and Vomiting     Ibuprofen Itching     Pneumovax [Pneumococcal Polysaccharides] Other (See Comments)     Pain in the arm and difficulty  moving the arm where injection was received     Sulfa Drugs Hives         Review of Systems:  -Skin Establ Pt: The patient denies any new rash, pruritus, or lesions that are symptomatic, changing or bleeding, except as per HPI.  -Constitutional: Otherwise feeling well today, in usual state of health.  -HEENT: Patient denies nonhealing oral sores.  -Skin: As above in HPI. No additional skin concerns.    Physical exam:  Vitals: There were no vitals taken for this visit.  GEN: This is a well developed, well-nourished female in no acute distress, in a pleasant mood.    SKIN: Focused examination of the face was performed.  - Montes skin type: II  - Mild pink erythema with few scattered telangiectasias on bilateral cheeks.   - No papules or pustules.   - No other lesions of concern on areas examined.     Impression/Plan:  1. Rosacea: Currently under good control with mild erythema and few telangiectasias. She is overall pleased with her current regimen.    Continue azelaic acid 20% cream, apply twice daily    [facility]  [address]  [place] [address] on close of this encounter.  Follow-up in 1 years, earlier for new or changing lesions.     Staff Involved:  I, Katja Oglesby, MS4, saw and examined the patient in the presence of Dr. Sanchez.

## 2019-09-13 NOTE — PROGRESS NOTES
Staff Physician Comments:   I saw and evaluated the patient with the medical student and I agree with the assessment and plan.  I was present for the examination..    Manuel Sanchez MD  Adjunct Clinical Professor  Department of Dermatology  [facility]: Phone: [phone], Fax: [phone]

## 2019-09-17 ENCOUNTER — ONCOLOGY VISIT (OUTPATIENT)
Dept: ONCOLOGY | Facility: CLINIC | Age: 74
End: 2019-09-17
Attending: INTERNAL MEDICINE
Payer: COMMERCIAL

## 2019-09-17 VITALS
HEART RATE: 80 BPM | SYSTOLIC BLOOD PRESSURE: 127 MMHG | RESPIRATION RATE: 16 BRPM | DIASTOLIC BLOOD PRESSURE: 67 MMHG | BODY MASS INDEX: 30.59 KG/M2 | HEIGHT: 62 IN | WEIGHT: 166.2 LBS | OXYGEN SATURATION: 99 % | TEMPERATURE: 98.4 F

## 2019-09-17 DIAGNOSIS — C83.30 DIFFUSE LARGE B-CELL LYMPHOMA, UNSPECIFIED BODY REGION (H): Primary | ICD-10-CM

## 2019-09-17 DIAGNOSIS — Z79.899 ENCOUNTER FOR LONG-TERM CURRENT USE OF MEDICATION: ICD-10-CM

## 2019-09-17 DIAGNOSIS — Z12.31 VISIT FOR SCREENING MAMMOGRAM: ICD-10-CM

## 2019-09-17 DIAGNOSIS — Z12.11 COLON CANCER SCREENING: ICD-10-CM

## 2019-09-17 DIAGNOSIS — Z51.81 ENCOUNTER FOR THERAPEUTIC DRUG MONITORING: ICD-10-CM

## 2019-09-17 DIAGNOSIS — C83.30 DIFFUSE LARGE B-CELL LYMPHOMA, UNSPECIFIED BODY REGION (H): ICD-10-CM

## 2019-09-17 DIAGNOSIS — C83.30 DLBCL (DIFFUSE LARGE B CELL LYMPHOMA) (H): ICD-10-CM

## 2019-09-17 LAB
ALBUMIN SERPL-MCNC: 3.2 G/DL (ref 3.4–5)
ALP SERPL-CCNC: 70 U/L (ref 40–150)
ALT SERPL W P-5'-P-CCNC: 23 U/L (ref 0–50)
ANION GAP SERPL CALCULATED.3IONS-SCNC: 6 MMOL/L (ref 3–14)
AST SERPL W P-5'-P-CCNC: 13 U/L (ref 0–45)
BASOPHILS # BLD AUTO: 0.1 10E9/L (ref 0–0.2)
BASOPHILS NFR BLD AUTO: 0.5 %
BILIRUB SERPL-MCNC: 0.3 MG/DL (ref 0.2–1.3)
BUN SERPL-MCNC: 19 MG/DL (ref 7–30)
CALCIUM SERPL-MCNC: 9.3 MG/DL (ref 8.5–10.1)
CHLORIDE SERPL-SCNC: 110 MMOL/L (ref 94–109)
CO2 SERPL-SCNC: 28 MMOL/L (ref 20–32)
CREAT SERPL-MCNC: 0.66 MG/DL (ref 0.52–1.04)
DIFFERENTIAL METHOD BLD: ABNORMAL
EOSINOPHIL # BLD AUTO: 0.2 10E9/L (ref 0–0.7)
EOSINOPHIL NFR BLD AUTO: 1.6 %
ERYTHROCYTE [DISTWIDTH] IN BLOOD BY AUTOMATED COUNT: 15.3 % (ref 10–15)
GFR SERPL CREATININE-BSD FRML MDRD: 86 ML/MIN/{1.73_M2}
GLUCOSE SERPL-MCNC: 95 MG/DL (ref 70–99)
HCT VFR BLD AUTO: 37.5 % (ref 35–47)
HGB BLD-MCNC: 11.7 G/DL (ref 11.7–15.7)
IMM GRANULOCYTES # BLD: 0 10E9/L (ref 0–0.4)
IMM GRANULOCYTES NFR BLD: 0.4 %
LDH SERPL L TO P-CCNC: 171 U/L (ref 81–234)
LYMPHOCYTES # BLD AUTO: 0.9 10E9/L (ref 0.8–5.3)
LYMPHOCYTES NFR BLD AUTO: 8.9 %
MCH RBC QN AUTO: 26.9 PG (ref 26.5–33)
MCHC RBC AUTO-ENTMCNC: 31.2 G/DL (ref 31.5–36.5)
MCV RBC AUTO: 86 FL (ref 78–100)
MONOCYTES # BLD AUTO: 0.7 10E9/L (ref 0–1.3)
MONOCYTES NFR BLD AUTO: 6.9 %
NEUTROPHILS # BLD AUTO: 8.6 10E9/L (ref 1.6–8.3)
NEUTROPHILS NFR BLD AUTO: 81.7 %
PLATELET # BLD AUTO: 259 10E9/L (ref 150–450)
POTASSIUM SERPL-SCNC: 4 MMOL/L (ref 3.4–5.3)
PROT SERPL-MCNC: 6.2 G/DL (ref 6.8–8.8)
RBC # BLD AUTO: 4.35 10E12/L (ref 3.8–5.2)
SODIUM SERPL-SCNC: 144 MMOL/L (ref 133–144)
URATE SERPL-MCNC: 3.8 MG/DL (ref 2.6–6)
WBC # BLD AUTO: 10.5 10E9/L (ref 4–11)

## 2019-09-17 PROCEDURE — 80053 COMPREHEN METABOLIC PANEL: CPT | Performed by: INTERNAL MEDICINE

## 2019-09-17 PROCEDURE — 84550 ASSAY OF BLOOD/URIC ACID: CPT | Performed by: INTERNAL MEDICINE

## 2019-09-17 PROCEDURE — 99213 OFFICE O/P EST LOW 20 MIN: CPT | Performed by: INTERNAL MEDICINE

## 2019-09-17 PROCEDURE — 83615 LACTATE (LD) (LDH) ENZYME: CPT | Performed by: INTERNAL MEDICINE

## 2019-09-17 PROCEDURE — 36415 COLL VENOUS BLD VENIPUNCTURE: CPT | Performed by: INTERNAL MEDICINE

## 2019-09-17 PROCEDURE — 85025 COMPLETE CBC W/AUTO DIFF WBC: CPT | Performed by: INTERNAL MEDICINE

## 2019-09-17 ASSESSMENT — MIFFLIN-ST. JEOR: SCORE: 1202.26

## 2019-09-17 ASSESSMENT — PAIN SCALES - GENERAL: PAINLEVEL: MODERATE PAIN (5)

## 2019-09-17 NOTE — PROGRESS NOTES
Oncology Follow-up visit:  Date on this visit: Sep 17, 2019      Primary Care Physician: Dr. Balwinder Hutton, [place], [place]    Rheumatology team: Dr. Jevon Moreno [place]      Dx:  Diffuse large B cell lymphoma    Oncologic History:  She woke up on 3/19/2015 with R chest wall bump. CT chest with contrast on 3/31/2015 showed a soft tissue lesion inseparable from the right costosternal junction which measured 6.1x6.6cm. There was no associated mediastinal, hilar or axillary lymphadenopathy. She underwent an US and a biopsy of the chest wall lesion and pathology showed findings c/w Diffuse large B cell lymphoma, germinal center type large B-cell lymphoma. The lymphoma cells were CD20+, BCL-6+, and CD23+. BCL-2 demonstrated only scattered weak equivocal staining. There was no significant staining for CD30, CD10, MUM-1, or CD15 is noted in the cells of interest. Ki-67 demonstrated a high proliferation rate estimated at 90% of the tumor cells. Chromogenic in situ  hybridization for EBV (VÍCTOR) was  negative in the lymphomatous cells.  Bone marrow biopsy performed on 4/8/2015 showed no morphologic evidence of lymphoma. There was no immunophenotypic evidence of lymphoma.  She saw Dr. Benavidez with hematology/oncology in [facility] on 4/8/2015 and was recommended to enroll in a clinical trial, [facility]  where patients with early stage DLBCL are given RCHOPx3 followed by PET ct scan and it the PET is positive, she would get involved field radiation and Zevalin and if the PET is negaitve she would get an additional cycle of RCHOP.   She sought a second opinion with us.   PET/CT of the chest, abdomen and pelvis on 4/10/2015 showed multiple hypermetabolic soft tissue nodules in the right anterior chest wall as well as a hypermetabolic right supraclavicular lymph node (hypermetabolic, 1.2 x 1.6 cm).  There was a 3.0 x 5.1 cm soft tissue mass at the right second sternocostal junction is hypermetabolic  with a maximum SUV of 15.3 and a 0.8 x 2.0 cm soft tissue nodule between the third and fourth ribs anteriorly which was mildly hypermetabolic with a maximum SUV of 6.9. 0.7 x 1.9 and 1.4 x 2.4 cm soft tissue nodules at the fourth sternocostal junction have a maximal  SUV of 12.9 and 7.1, respectively. Additionally, there was a  2 mm right upper lobe pulmonary nodule.    Given extranodal disease, she had an LP performed at [facility] on 4/13/2015. 4cc clear CSF removed. 2 cc sent for flow cytometry. Cytology showed rare lymphocytes that showed smooth nuclear contours. Flow cytometry did not show a monoclonal B cell population.      She proceeded with cycle 1 of R-CHOP on 4/20/2015, with Neulasta support. She has received 3 cycles so far, last on 6/2/2015.  She has tolerated chemotherapy very well and her R chest wall mass has almost entirely disappeared over the course of the first 3-4 days of the first cycle.   She underwent PET/CT scan on 6/20/2015 which showed complete radiographic response to treatment.  She has completed 3000cGY to R anterior CW from 7/2/2015-7/22/2015 under direction of Dr. Foss in [place].    She was noted to have a mildly low IgG in [facility] [employer] [facility] on 4/27/2018.    Echocardiogram 4/10/2015:  Left Ventricle EF of 60-65%. RV function normal. Mild left atrial enlargement.      History Of Present Illness:  Ms. Garcia is a 73 year old female who presents for follow-up of treated Diffuse large B cell lymphoma, as above.  She moved to [place] but continues to visit family in [facility] and continues her f/up with us. She has been seeing Dr. Jevon Moreno, in [place], [place]. She is on  prednisone for RA and remains on 5 mg PO daily but was not able to completely wean off prednisone. .She has received two doses of Rituxan in October 2017 but not since. She is not on MTX.  She is still on low-dose prednisone and her rheumatologist is planning to obtain a DEXA scan in the near  future.  She also gets labs locally with her rheumatologist.  She has tried Otezla but had a reaction to it only after couple doses.  She had a negative screening mammogram on September 13, 2019.  She has a history of anemia of chronic disease (secondary to RA), which has corrected with iron supplementation in the past.    She is feeling well and has no health related concerns except for chronic joint pains which she rates at 5/10 and uses Tylenol prn.  She reports today that she is feeling really well.  She is here for her brother's 80th birthday.  In addition, a complete 12 point  review of systems is negative.    Past Medical/Surgical History:  Past Medical History:   Diagnosis Date     Chronic pain      Psoriasis      Rheumatoid arthritis(714.0)      Steroid long-term use      Past Surgical History:   Procedure Laterality Date     ARTHROPLASTY KNEE  9/20/2011    Procedure:ARTHROPLASTY KNEE; Left Total Knee Replacement ; Surgeon:BLAYNE GAYTAN; Location:[facility]     ARTHROSCOPY KNEE BILATERAL       EXTRACAPSULAR CATARACT EXTRATION WITH INTRAOCULAR LENS IMPLANT      right eye 1/2011     PHACOEMULSIFICATION CLEAR CORNEA WITH STANDARD INTRAOCULAR LENS IMPLANT  4/16/2013    Procedure: PHACOEMULSIFICATION CLEAR CORNEA WITH STANDARD INTRAOCULAR LENS IMPLANT;  LEFT EYE PHACOEMULSIFICATION CLEAR CORNEA WITH STANDARD INTRAOCULAR LENS IMPLANT ;  Surgeon: Tawanda Sanchez MD;  Location: [facility]     SURGICAL HISTORY OF -   1/19/2011    Cataract, right eye     SYNOVECTOMY HIP      not hip/ bilateral hands   She saw Dr. Sanchez in 2015. and had a L groin lesion biopsied and it was c/w psoriasis on Bx and she will be following up with him.   As far as RA, she was diagnosed in 2000, had focally erosive disease and has been on Enbrel and Remicade in the past. Prior to Dx of DLBCL she was on Humira and MTX.   FHx and Social Hx reviewed.    Allergies:  Allergies as of 09/17/2019 - Reviewed 09/17/2019   Allergen Reaction Noted      Codeine Nausea and Vomiting 01/31/2006     Ibuprofen Itching 07/06/2005     Pneumovax [pneumococcal polysaccharides] Other (See Comments) 08/07/2017     Sulfa drugs Hives 01/31/2006     Current Medications:  Current Outpatient Medications   Medication Sig Dispense Refill     acetaminophen (TYLENOL) 325 MG tablet Take 2 tablets by mouth 3 times daily as needed. 60 tablet 0     albuterol (PROAIR HFA/PROVENTIL HFA/VENTOLIN HFA) 108 (90 Base) MCG/ACT inhaler Inhale 1 puff into the lungs 3 times daily as needed for shortness of breath / dyspnea or wheezing       amoxicillin (AMOXIL) 500 MG capsule Take 4 capsules (2 grams), by mouth, 1 hour before dental work. 8 capsule 1     amoxicillin (AMOXIL) 500 MG tablet Take 2 grams (4 tabs) by mouth one hour before dental appointment 12 tablet 1     aspirin 81 MG tablet Take 1 tablet by mouth daily.       augmented betamethasone dipropionate (DIPROLENE AF) 0.05 % cream Apply topically 2 times daily 30 g 0     azelaic acid (AZELEX) 20 % cream Apply topically 2 times daily 30 g 11     azelaic acid (AZELEX) 20 % external cream Apply topically 2 times daily 30 g 1     Azelaic Acid (FINACEA) 15 % gel Apply 0.5 inches topically See Admin Instructions massage thin film gently into afected areas morning and evening 150 g 3     benzonatate (TESSALON) 100 MG capsule Take 1 capsule (100 mg) by mouth 3 times daily as needed 42 capsule 1     calcipotriene 0.005 % OINT Can use twice daily to affected areas of psoriasis, in between topical steroid uses. 120 g 1     Calcium Carbonate-Vitamin D (CALCIUM 600+D PO) Take 1,200 mg by mouth daily.       desonide (DESOWEN) 0.05 % ointment Apply topically 2 times daily Apply to affected areas of face twice daily until resolved, then taper to once daily for one week. 60 g 1     hydroxychloroquine (PLAQUENIL) 200 MG tablet Take 2 tablets (400 mg) by mouth daily 180 tablet 0     ketoconazole (NIZORAL) 2 % cream Apply topically 2 times daily 60 g 1      "ORDER FOR DME Equipment being ordered: cranial prosthesis.  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 2 Units 0     ORDER FOR DME A pair of foot orthotics. 1 Package 0     predniSONE (DELTASONE) 5 MG tablet Take 4 tablets (20 mg) daily for 2 weeks, then take 2 tablets (10 mg) daily for 2 weeks, then back to 5 mg daily. 98 tablet 0     predniSONE (DELTASONE) 5 MG tablet Take 1 tablet (5 mg) by mouth daily 90 tablet 0     triamcinolone (KENALOG) 0.1 % ointment Apply to affected area twice daily for two weeks at a time. 30 g 2        Physical Exam:    /67 (BP Location: Right arm)   Pulse 80   Temp 98.4  F (36.9  C) (Oral)   Resp 16   Ht 1.575 m (5' 2.01\")   Wt 75.4 kg (166 lb 3.2 oz)   SpO2 99%   BMI 30.39 kg/m          GENERAL APPEARANCE: healthy, alert and no distress     HENT: Mouth without ulcers or lesions. No throat erythema.     NECK: no adenopathy, no asymmetry or masses     LYMPHATICS: No cervical, supraclavicular, axillary lymphadenopathy b/l     RESP: lungs clear to auscultation - no rales, rhonchi or wheezes     CARDIOVASCULAR: regular rates and rhythm, normal S1 S2, no S3 or S4 and no murmur.     ABDOMEN:  soft, nontender, no HSM or masses and bowel sounds normal     MUSCULOSKELETAL: extremities normal- no gross deformities noted, no evidence of inflammation in joints, FROM in all extremities. No edema b/l LE.  Chest: No right chest wall masses. Incision healed well.      SKIN: no suspicious lesions or rashes     PSYCHIATRIC: mentation appears normal and affect normal  Breast: No axillary lymphadenopathy b/l.    Laboratory/Imaging Studies  Orders Only on 09/17/2019   Component Date Value Ref Range Status     Lactate Dehydrogenase 09/17/2019 171  81 - 234 U/L Final     Sodium 09/17/2019 144  133 - 144 mmol/L Final     Potassium 09/17/2019 4.0  3.4 - 5.3 mmol/L Final     Chloride 09/17/2019 110* 94 - 109 mmol/L Final     Carbon Dioxide 09/17/2019 28  20 - 32 mmol/L " Final     Anion Gap 09/17/2019 6  3 - 14 mmol/L Final     Glucose 09/17/2019 95  70 - 99 mg/dL Final    Non Fasting     Urea Nitrogen 09/17/2019 19  7 - 30 mg/dL Final     Creatinine 09/17/2019 0.66  0.52 - 1.04 mg/dL Final     GFR Estimate 09/17/2019 86  >60 mL/min/[1.73_m2] Final    Comment: Non  GFR Calc  Starting 12/18/2018, serum creatinine based estimated GFR (eGFR) will be   calculated using the Chronic Kidney Disease Epidemiology Collaboration   (CKD-EPI) equation.       GFR Estimate If Black 09/17/2019 >90  >60 mL/min/[1.73_m2] Final    Comment:  GFR Calc  Starting 12/18/2018, serum creatinine based estimated GFR (eGFR) will be   calculated using the Chronic Kidney Disease Epidemiology Collaboration   (CKD-EPI) equation.       Calcium 09/17/2019 9.3  8.5 - 10.1 mg/dL Final     Bilirubin Total 09/17/2019 0.3  0.2 - 1.3 mg/dL Final     Albumin 09/17/2019 3.2* 3.4 - 5.0 g/dL Final     Protein Total 09/17/2019 6.2* 6.8 - 8.8 g/dL Final     Alkaline Phosphatase 09/17/2019 70  40 - 150 U/L Final     ALT 09/17/2019 23  0 - 50 U/L Final     AST 09/17/2019 13  0 - 45 U/L Final     WBC 09/17/2019 10.5  4.0 - 11.0 10e9/L Final     RBC Count 09/17/2019 4.35  3.8 - 5.2 10e12/L Final     Hemoglobin 09/17/2019 11.7  11.7 - 15.7 g/dL Final     Hematocrit 09/17/2019 37.5  35.0 - 47.0 % Final     MCV 09/17/2019 86  78 - 100 fl Final     MCH 09/17/2019 26.9  26.5 - 33.0 pg Final     MCHC 09/17/2019 31.2* 31.5 - 36.5 g/dL Final     RDW 09/17/2019 15.3* 10.0 - 15.0 % Final     Platelet Count 09/17/2019 259  150 - 450 10e9/L Final     % Neutrophils 09/17/2019 81.7  % Final     % Lymphocytes 09/17/2019 8.9  % Final     % Monocytes 09/17/2019 6.9  % Final     % Eosinophils 09/17/2019 1.6  % Final     % Basophils 09/17/2019 0.5  % Final     % Immature Granulocytes 09/17/2019 0.4  % Final     Absolute Neutrophil 09/17/2019 8.6* 1.6 - 8.3 10e9/L Final     Absolute Lymphocytes 09/17/2019 0.9  0.8 - 5.3  10e9/L Final     Absolute Monocytes 09/17/2019 0.7  0.0 - 1.3 10e9/L Final     Absolute Eosinophils 09/17/2019 0.2  0.0 - 0.7 10e9/L Final     Absolute Basophils 09/17/2019 0.1  0.0 - 0.2 10e9/L Final     Abs Immature Granulocytes 09/17/2019 0.0  0 - 0.4 10e9/L Final     Diff Method 09/17/2019 Automated Method   Final     Uric Acid 09/17/2019 3.8  2.6 - 6.0 mg/dL Final       ASSESSMENT/PLAN:  The patient is a very pleasant 73 year old woman with stage IIAE Diffuse large B cell lymphoma involving right anterior chest wall, right supraclavicular LN and eroding into sternum. She falls in the low-intermediate risk IPI group when not adjusted for age, with IPI score of 2 which with associated with 67% CR rate with treatment and 51% 5 year overall survival rate. If adjusted for age, she would fall into low risk or IPI of 0 score, associated with CR rate of 91% and 56% 5 year overall survival.  She has had an excellent clinical response after one cycle of RCHOP and is in complete radiographic CR after 3 cycles of  R-CHOP, last on 6/2/2015. She has completed 3000cGY to R anterior CW from 7/2/2015-7/22/2015 under direction of Dr. Foss in [place].  She had no e/o disease recurrence on her last CT chest form 9/2018.    1. Diffuse large B cell lymphoma -  She has no e/o disease recurrence clinically. F/up of stage II Diffuse large B cell lymphoma in years 4 and 5 includes H&P and labs every 3-6 months for 5 years, then annually and imaging as clinically indicated.  Her 5-year ruth will be in July 2020.  If she will be coming back to [place] in 6 months, she will be seen us with labs in 6 months.  If she is not coming back to [place] in 6 months, then we will see her in 1 year, with labs in.  Patient is comfortable with this plan.  She will also be getting labs locally in the interim through her rheumatologist.    2. Breast Cancer Screening - b/l screening mammogram negative 9/2019. F/up in one year.    3. Colon Cancer  Screening - colonoscopy on 4/16/2015. The exam was normal and repeat was recommended in 5 years for screening purposes.    4. RA - currently off MTX and on low dose  Prednisone. She is s/p Rituxan weekly x 2 in October 2017. F/up with Dr. Montilla in  [place].     5. Psoriasis (n/a today)- f/up with dermatology. She saw Dr. Sanchez in  in the past.    At the end of our visit patient verbalized understanding and concurred with the plan.

## 2019-09-17 NOTE — NURSING NOTE
"Oncology Rooming Note    September 17, 2019 11:33 AM   Randi Garcia is a 73 year old female who presents for:    Chief Complaint   Patient presents with     Oncology Clinic Visit     Follow up     Initial Vitals: /67 (BP Location: Right arm)   Pulse 80   Temp 98.4  F (36.9  C) (Oral)   Resp 16   Ht 1.575 m (5' 2.01\")   Wt 75.4 kg (166 lb 3.2 oz)   SpO2 99%   BMI 30.39 kg/m   Estimated body mass index is 30.39 kg/m  as calculated from the following:    Height as of this encounter: 1.575 m (5' 2.01\").    Weight as of this encounter: 75.4 kg (166 lb 3.2 oz). Body surface area is 1.82 meters squared.  Moderate Pain (5) Comment: Data Unavailable   No LMP recorded. Patient is postmenopausal.  Allergies reviewed: Yes  Medications reviewed: Yes    Medications: Medication refills not needed today.  Pharmacy name entered into EPIC:    [facility]  [facility] - [facility], [place] - [address] [place] [address] [facility] - [place], [place] - [address] [place] [id number] - [place], [place] - [address] [facility] [address] [place], [place] - [address] [id number] - [place], [place] - [address] [id number] - [place], [place] - [address] [id number] - [place] - [id number] [place] [address]    Georgie Simms LPN            "

## 2019-09-17 NOTE — LETTER
9/17/2019         RE: Randi Garcia  [address] [facility] [address]        Dear Colleague,    Thank you for referring your patient, Randi Garcia, to the [facility]. Please see a copy of my visit note below.    Oncology Follow-up visit:  Date on this visit: Sep 17, 2019      Primary Care Physician: Dr. Balwinder Hutton, [place], [place]    Rheumatology team: Dr. Jevon Moreno, [place]      Dx:  Diffuse large B cell lymphoma    Oncologic History:  She woke up on 3/19/2015 with R chest wall bump. CT chest with contrast on 3/31/2015 showed a soft tissue lesion inseparable from the right costosternal junction which measured 6.1x6.6cm. There was no associated mediastinal, hilar or axillary lymphadenopathy. She underwent an US and a biopsy of the chest wall lesion and pathology showed findings c/w Diffuse large B cell lymphoma, germinal center type large B-cell lymphoma. The lymphoma cells were CD20+, BCL-6+, and CD23+. BCL-2 demonstrated only scattered weak equivocal staining. There was no significant staining for CD30, CD10, MUM-1, or CD15 is noted in the cells of interest. Ki-67 demonstrated a high proliferation rate estimated at 90% of the tumor cells. Chromogenic in situ  hybridization for EBV (VÍCTOR) was  negative in the lymphomatous cells.  Bone marrow biopsy performed on 4/8/2015 showed no morphologic evidence of lymphoma. There was no immunophenotypic evidence of lymphoma.  She saw Dr. Benavidez with hematology/oncology in [facility] on 4/8/2015 and was recommended to enroll in a clinical trial, [facility]  where patients with early stage DLBCL are given RCHOPx3 followed by PET ct scan and it the PET is positive, she would get involved field radiation and Zevalin and if the PET is negaitve she would get an additional cycle of RCHOP.   She sought a second opinion with us.   PET/CT of the chest, abdomen and pelvis on 4/10/2015 showed multiple  hypermetabolic soft tissue nodules in the right anterior chest wall as well as a hypermetabolic right supraclavicular lymph node (hypermetabolic, 1.2 x 1.6 cm).  There was a 3.0 x 5.1 cm soft tissue mass at the right second sternocostal junction is hypermetabolic with a maximum SUV of 15.3 and a 0.8 x 2.0 cm soft tissue nodule between the third and fourth ribs anteriorly which was mildly hypermetabolic with a maximum SUV of 6.9. 0.7 x 1.9 and 1.4 x 2.4 cm soft tissue nodules at the fourth sternocostal junction have a maximal  SUV of 12.9 and 7.1, respectively. Additionally, there was a  2 mm right upper lobe pulmonary nodule.    Given extranodal disease, she had an LP performed at [facility] on 4/13/2015. 4cc clear CSF removed. 2 cc sent for flow cytometry. Cytology showed rare lymphocytes that showed smooth nuclear contours. Flow cytometry did not show a monoclonal B cell population.      She proceeded with cycle 1 of R-CHOP on 4/20/2015, with Neulasta support. She has received 3 cycles so far, last on 6/2/2015.  She has tolerated chemotherapy very well and her R chest wall mass has almost entirely disappeared over the course of the first 3-4 days of the first cycle.   She underwent PET/CT scan on 6/20/2015 which showed complete radiographic response to treatment.  She has completed 3000cGY to R anterior CW from 7/2/2015-7/22/2015 under direction of Dr. Foss in [place].    She was noted to have a mildly [facility] 4[facility].    Echocardiogram 4/10/2015:  Left Ventricle EF of 60-65%. RV function normal. Mild left atrial enlargement.      History Of Present Illness:  Ms. Garcia is a 73 year old female who presents for follow-up of treated Diffuse large B cell lymphoma, as above.  She moved to [place] but continues to visit family in [facility] and continues her f/up with us. She has been seeing Dr. Jevon Moreno, in [place], [place]. She is on  prednisone for RA and remains  on 5 mg PO daily but was not able to completely wean off prednisone. .She has received two doses of Rituxan in October 2017 but not since. She is not on MTX.  She is still on low-dose prednisone and her rheumatologist is planning to obtain a DEXA scan in the near future.  She also gets labs locally with her rheumatologist.  She has tried Otezla but had a reaction to it only after couple doses.  She had a negative screening mammogram on September 13, 2019.  She has a history of anemia of chronic disease (secondary to RA), which has corrected with iron supplementation in the past.    She is feeling well and has no health related concerns except for chronic joint pains which she rates at 5/10 and uses Tylenol prn.  She reports today that she is feeling really well.  She is here for her brother's 80th birthday.  In addition, a complete 12 point  review of systems is negative.    Past Medical/Surgical History:  Past Medical History:   Diagnosis Date     Chronic pain      Psoriasis      Rheumatoid arthritis(714.0)      Steroid long-term use      Past Surgical History:   Procedure Laterality Date     ARTHROPLASTY KNEE  9/20/2011    Procedure:ARTHROPLASTY KNEE; Left Total Knee Replacement ; Surgeon:BLAYNE GAYTAN; Location:[facility]     ARTHROSCOPY KNEE BILATERAL       EXTRACAPSULAR CATARACT EXTRATION WITH INTRAOCULAR LENS IMPLANT      right eye 1/2011     PHACOEMULSIFICATION CLEAR CORNEA WITH STANDARD INTRAOCULAR LENS IMPLANT  4/16/2013    Procedure: PHACOEMULSIFICATION CLEAR CORNEA WITH STANDARD INTRAOCULAR LENS IMPLANT;  LEFT EYE PHACOEMULSIFICATION CLEAR CORNEA WITH STANDARD INTRAOCULAR LENS IMPLANT ;  Surgeon: Tawanda Sanchez MD;  Location: [facility]     SURGICAL HISTORY OF -   1/19/2011    Cataract, right eye     SYNOVECTOMY HIP      not hip/ bilateral hands   She saw Dr. Sanchez in 2015. and had a L groin lesion biopsied and it was c/w psoriasis on Bx and she will be following up with him.   As far as RA, she was  diagnosed in 2000, had focally erosive disease and has been on Enbrel and Remicade in the past. Prior to Dx of DLBCL she was on Humira and MTX.   FHx and Social Hx reviewed.    Allergies:  Allergies as of 09/17/2019 - Reviewed 09/17/2019   Allergen Reaction Noted     Codeine Nausea and Vomiting 01/31/2006     Ibuprofen Itching 07/06/2005     Pneumovax [pneumococcal polysaccharides] Other (See Comments) 08/07/2017     Sulfa drugs Hives 01/31/2006     Current Medications:  Current Outpatient Medications   Medication Sig Dispense Refill     acetaminophen (TYLENOL) 325 MG tablet Take 2 tablets by mouth 3 times daily as needed. 60 tablet 0     albuterol (PROAIR HFA/PROVENTIL HFA/VENTOLIN HFA) 108 (90 Base) MCG/ACT inhaler Inhale 1 puff into the lungs 3 times daily as needed for shortness of breath / dyspnea or wheezing       amoxicillin (AMOXIL) 500 MG capsule Take 4 capsules (2 grams), by mouth, 1 hour before dental work. 8 capsule 1     amoxicillin (AMOXIL) 500 MG tablet Take 2 grams (4 tabs) by mouth one hour before dental appointment 12 tablet 1     aspirin 81 MG tablet Take 1 tablet by mouth daily.       augmented betamethasone dipropionate (DIPROLENE AF) 0.05 % cream Apply topically 2 times daily 30 g 0     azelaic acid (AZELEX) 20 % cream Apply topically 2 times daily 30 g 11     azelaic acid (AZELEX) 20 % external cream Apply topically 2 times daily 30 g 1     Azelaic Acid (FINACEA) 15 % gel Apply 0.5 inches topically See Admin Instructions massage thin film gently into afected areas morning and evening 150 g 3     benzonatate (TESSALON) 100 MG capsule Take 1 capsule (100 mg) by mouth 3 times daily as needed 42 capsule 1     calcipotriene 0.005 % OINT Can use twice daily to affected areas of psoriasis, in between topical steroid uses. 120 g 1     Calcium Carbonate-Vitamin D (CALCIUM 600+D PO) Take 1,200 mg by mouth daily.       desonide (DESOWEN) 0.05 % ointment Apply topically 2 times daily Apply to affected  "areas of face twice daily until resolved, then taper to once daily for one week. 60 g 1     hydroxychloroquine (PLAQUENIL) 200 MG tablet Take 2 tablets (400 mg) by mouth daily 180 tablet 0     ketoconazole (NIZORAL) 2 % cream Apply topically 2 times daily 60 g 1     ORDER FOR DME Equipment being ordered: cranial prosthesis.  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 2 Units 0     ORDER FOR DME A pair of foot orthotics. 1 Package 0     predniSONE (DELTASONE) 5 MG tablet Take 4 tablets (20 mg) daily for 2 weeks, then take 2 tablets (10 mg) daily for 2 weeks, then back to 5 mg daily. 98 tablet 0     predniSONE (DELTASONE) 5 MG tablet Take 1 tablet (5 mg) by mouth daily 90 tablet 0     triamcinolone (KENALOG) 0.1 % ointment Apply to affected area twice daily for two weeks at a time. 30 g 2        Physical Exam:    /67 (BP Location: Right arm)   Pulse 80   Temp 98.4  F (36.9  C) (Oral)   Resp 16   Ht 1.575 m (5' 2.01\")   Wt 75.4 kg (166 lb 3.2 oz)   SpO2 99%   BMI 30.39 kg/m           GENERAL APPEARANCE: healthy, alert and no distress     HENT: Mouth without ulcers or lesions. No throat erythema.     NECK: no adenopathy, no asymmetry or masses     LYMPHATICS: No cervical, supraclavicular, axillary lymphadenopathy b/l     RESP: lungs clear to auscultation - no rales, rhonchi or wheezes     CARDIOVASCULAR: regular rates and rhythm, normal S1 S2, no S3 or S4 and no murmur.     ABDOMEN:  soft, nontender, no HSM or masses and bowel sounds normal     MUSCULOSKELETAL: extremities normal- no gross deformities noted, no evidence of inflammation in joints, FROM in all extremities. No edema b/l LE.  Chest: No right chest wall masses. Incision healed well.      SKIN: no suspicious lesions or rashes     PSYCHIATRIC: mentation appears normal and affect normal  Breast: No axillary lymphadenopathy b/l.    Laboratory/Imaging Studies  Orders Only on 09/17/2019   Component Date Value Ref Range " Status     Lactate Dehydrogenase 09/17/2019 171  81 - 234 U/L Final     Sodium 09/17/2019 144  133 - 144 mmol/L Final     Potassium 09/17/2019 4.0  3.4 - 5.3 mmol/L Final     Chloride 09/17/2019 110* 94 - 109 mmol/L Final     Carbon Dioxide 09/17/2019 28  20 - 32 mmol/L Final     Anion Gap 09/17/2019 6  3 - 14 mmol/L Final     Glucose 09/17/2019 95  70 - 99 mg/dL Final    Non Fasting     Urea Nitrogen 09/17/2019 19  7 - 30 mg/dL Final     Creatinine 09/17/2019 0.66  0.52 - 1.04 mg/dL Final     GFR Estimate 09/17/2019 86  >60 mL/min/[1.73_m2] Final    Comment: Non  GFR Calc  Starting 12/18/2018, serum creatinine based estimated GFR (eGFR) will be   calculated using the Chronic Kidney Disease Epidemiology Collaboration   (CKD-EPI) equation.       GFR Estimate If Black 09/17/2019 >90  >60 mL/min/[1.73_m2] Final    Comment:  GFR Calc  Starting 12/18/2018, serum creatinine based estimated GFR (eGFR) will be   calculated using the Chronic Kidney Disease Epidemiology Collaboration   (CKD-EPI) equation.       Calcium 09/17/2019 9.3  8.5 - 10.1 mg/dL Final     Bilirubin Total 09/17/2019 0.3  0.2 - 1.3 mg/dL Final     Albumin 09/17/2019 3.2* 3.4 - 5.0 g/dL Final     Protein Total 09/17/2019 6.2* 6.8 - 8.8 g/dL Final     Alkaline Phosphatase 09/17/2019 70  40 - 150 U/L Final     ALT 09/17/2019 23  0 - 50 U/L Final     AST 09/17/2019 13  0 - 45 U/L Final     WBC 09/17/2019 10.5  4.0 - 11.0 10e9/L Final     RBC Count 09/17/2019 4.35  3.8 - 5.2 10e12/L Final     Hemoglobin 09/17/2019 11.7  11.7 - 15.7 g/dL Final     Hematocrit 09/17/2019 37.5  35.0 - 47.0 % Final     MCV 09/17/2019 86  78 - 100 fl Final     MCH 09/17/2019 26.9  26.5 - 33.0 pg Final     MCHC 09/17/2019 31.2* 31.5 - 36.5 g/dL Final     RDW 09/17/2019 15.3* 10.0 - 15.0 % Final     Platelet Count 09/17/2019 259  150 - 450 10e9/L Final     % Neutrophils 09/17/2019 81.7  % Final     % Lymphocytes 09/17/2019 8.9  % Final     % Monocytes  09/17/2019 6.9  % Final     % Eosinophils 09/17/2019 1.6  % Final     % Basophils 09/17/2019 0.5  % Final     % Immature Granulocytes 09/17/2019 0.4  % Final     Absolute Neutrophil 09/17/2019 8.6* 1.6 - 8.3 10e9/L Final     Absolute Lymphocytes 09/17/2019 0.9  0.8 - 5.3 10e9/L Final     Absolute Monocytes 09/17/2019 0.7  0.0 - 1.3 10e9/L Final     Absolute Eosinophils 09/17/2019 0.2  0.0 - 0.7 10e9/L Final     Absolute Basophils 09/17/2019 0.1  0.0 - 0.2 10e9/L Final     Abs Immature Granulocytes 09/17/2019 0.0  0 - 0.4 10e9/L Final     Diff Method 09/17/2019 Automated Method   Final     Uric Acid 09/17/2019 3.8  2.6 - 6.0 mg/dL Final       ASSESSMENT/PLAN:  The patient is a very pleasant 73 year old woman with stage IIAE Diffuse large B cell lymphoma involving right anterior chest wall, right supraclavicular LN and eroding into sternum. She falls in the low-intermediate risk IPI group when not adjusted for age, with IPI score of 2 which with associated with 67% CR rate with treatment and 51% 5 year overall survival rate. If adjusted for age, she would fall into low risk or IPI of 0 score, associated with CR rate of 91% and 56% 5 year overall survival.  She has had an excellent clinical response after one cycle of RCHOP and is in complete radiographic CR after 3 cycles of  R-CHOP, last on 6/2/2015. She has completed 3000cGY to R anterior CW from 7/2/2015-7/22/2015 under direction of Dr. Foss in [place].  She had no e/o disease recurrence on her last CT chest form 9/2018.    1. Diffuse large B cell lymphoma -  She has no e/o disease recurrence clinically. F/up of stage II Diffuse large B cell lymphoma in years 4 and 5 includes H&P and labs every 3-6 months for 5 years, then annually and imaging as clinically indicated.  Her 5-year ruth will be in July 2020.  If she will be coming back to [place] in 6 months, she will be seen us with labs in 6 months.  If she is not coming back to [place] in 6 months, then we  will see her in 1 year, with labs in.  Patient is comfortable with this plan.  She will also be getting labs locally in the interim through her rheumatologist.    2. Breast Cancer Screening - b/l screening mammogram negative 9/2019. F/up in one year.    3. Colon Cancer Screening - colonoscopy on 4/16/2015. The exam was normal and repeat was recommended in 5 years for screening purposes.    4. RA - currently off MTX and on low dose  Prednisone. She is s/p Rituxan weekly x 2 in October 2017. F/up with Dr. Montilla in  [place].     5. Psoriasis (n/a today)- f/up with dermatology. She saw Dr. Sanchez in  in the past.    At the end of our visit patient verbalized understanding and concurred with the plan.          Again, thank you for allowing me to participate in the care of your patient.        Sincerely,        Sue Garcia MD, MD

## 2019-10-02 ENCOUNTER — HEALTH MAINTENANCE LETTER (OUTPATIENT)
Age: 74
End: 2019-10-02

## 2019-12-15 ENCOUNTER — HEALTH MAINTENANCE LETTER (OUTPATIENT)
Age: 74
End: 2019-12-15

## 2020-01-21 ENCOUNTER — TRANSFERRED RECORDS (OUTPATIENT)
Dept: HEALTH INFORMATION MANAGEMENT | Facility: CLINIC | Age: 75
End: 2020-01-21

## 2020-01-21 LAB
ALT SERPL-CCNC: 19 U/L (ref 5–40)
AST SERPL-CCNC: 20 U/L (ref 9–40)
CREAT SERPL-MCNC: 0.77 MG/DL (ref 0.4–1.4)

## 2020-05-13 NOTE — PROGRESS NOTES
"Randi Garcia is a 74 year old female who is being evaluated via a billable video visit.      The patient has been notified of following:     \"This video visit will be conducted via a call between you and your physician/provider. We have found that certain health care needs can be provided without the need for an in-person physical exam.  This service lets us provide the care you need with a video conversation.  If a prescription is necessary we can send it directly to your pharmacy.  If lab work is needed we can place an order for that and you can then stop by our lab to have the test done at a later time.    Video visits are billed at different rates depending on your insurance coverage.  Please reach out to your insurance provider with any questions.    If during the course of the call the physician/provider feels a video visit is not appropriate, you will not be charged for this service.\"    Patient has given verbal consent for Video visit? yes    Video-Visit Details    Type of service:  Video Visit    Video visit duration: 13 min    Originating Location (pt. Location):home    Distant Location (provider location):  [facility]     Platform used for Video Visit:JOVON Garcia MD, MD            Oncology Follow-up visit:  Date on this visit: May 14, 2020    Primary Care Physician: Dr. Balwinder Hutton, [place], [place]    Rheumatology team: Dr. Jevon Moreno [place], [place]      Dx:  Diffuse large B cell lymphoma    Oncologic History:  She woke up on 3/19/2015 with R chest wall bump. CT chest with contrast on 3/31/2015 showed a soft tissue lesion inseparable from the right costosternal junction which measured 6.1x6.6cm. There was no associated mediastinal, hilar or axillary lymphadenopathy. She underwent an US and a biopsy of the chest wall lesion and pathology showed findings c/w Diffuse large B cell lymphoma, germinal center type large B-cell lymphoma. The lymphoma cells were " CD20+, BCL-6+, and CD23+. BCL-2 demonstrated only scattered weak equivocal staining. There was no significant staining for CD30, CD10, MUM-1, or CD15 is noted in the cells of interest. Ki-67 demonstrated a high proliferation rate estimated at 90% of the tumor cells. Chromogenic in situ  hybridization for EBV (VÍCTOR) was  negative in the lymphomatous cells.  Bone marrow biopsy performed on 4/8/2015 showed no morphologic evidence of lymphoma. There was no immunophenotypic evidence of lymphoma.  She saw Dr. Benavidez with hematology/oncology in [facility] on 4/8/2015 and was recommended to enroll in a clinical trial, [facility]  where patients with early stage DLBCL are given RCHOPx3 followed by PET ct scan and it the PET is positive, she would get involved field radiation and Zevalin and if the PET is negaitve she would get an additional cycle of RCHOP.   She sought a second opinion with us.   PET/CT of the chest, abdomen and pelvis on 4/10/2015 showed multiple hypermetabolic soft tissue nodules in the right anterior chest wall as well as a hypermetabolic right supraclavicular lymph node (hypermetabolic, 1.2 x 1.6 cm).  There was a 3.0 x 5.1 cm soft tissue mass at the right second sternocostal junction is hypermetabolic with a maximum SUV of 15.3 and a 0.8 x 2.0 cm soft tissue nodule between the third and fourth ribs anteriorly which was mildly hypermetabolic with a maximum SUV of 6.9. 0.7 x 1.9 and 1.4 x 2.4 cm soft tissue nodules at the fourth sternocostal junction have a maximal  SUV of 12.9 and 7.1, respectively. Additionally, there was a  2 mm right upper lobe pulmonary nodule.    Given extranodal disease, she had an LP performed at [facility] on 4/13/2015. 4cc clear CSF removed. 2 cc sent for flow cytometry. Cytology showed rare lymphocytes that showed smooth nuclear contours. Flow cytometry did not show a monoclonal B cell population.      She proceeded with cycle 1 of R-CHOP on 4/20/2015, with  Neulasta support. She has received 3 cycles so far, last on 6/2/2015.  She has tolerated chemotherapy very well and her R chest wall mass has almost entirely disappeared over the course of the first 3-4 days of the first cycle.   She underwent PET/CT scan on 6/20/2015 which showed complete radiographic response to treatment.  She has completed 3000cGY to R anterior CW from 7/2/2015-7/22/2015 under direction of Dr. Foss in [place].    She was noted to have a mildly low IgG in [facility] on 4/27/2018.    Echocardiogram 4/10/2015:  Left Ventricle EF of 60-65%. RV function normal. Mild left atrial enlargement.      History Of Present Illness:  Ms. Garcia is a 74 year old female who presents for follow-up of treated Diffuse large B cell lymphoma, as above.   She has been seeing Dr. Jevon Moreno, from rheumatology in [place], [place]. She is on  prednisone for RA and remains on 5 mg PO daily but was not able to completely wean off prednisone. She had a negative screening mammogram on September 13, 2019.  She has a history of anemia of chronic disease (secondary to RA), which has corrected with iron supplementation in the past.   I have reviewed her outside labs from [place].  These were done on January 21, 2020.  They demonstrated normal LFTs with the exception of low total protein of 5.9.  She had normal creatinine of 0.77.  She had immunoglobulin panel checked and her IgM was low at 24, IgG was low at 484 mg/dL and serum IgA was normal.  Rheumatoid factor was negative.  CBC with differential counts demonstrated white blood cell count of 7.4.  Hemoglobin was borderline low at 11.8 g/dL (normal reference range 11.9 to 16 g/dL), MCV of 82 (reference range ), normal platelet count of 2 79,000/mcL.  Absolute neutrophil count was normal at 6.49 and absolute lymphocytes were slightly low at 0.51.  The rest of absolute differential counts were normal.  ESR was normal at 16.  Her last ferritin was  checked in our system in September 2018 and was 163.  She is feeling well and has no health related concerns except for chronic joint pains especially in her L replaced knee, associated with some fluid in it, chronic, moderate pain.   She reports today that she is feeling really well.    In addition, a complete 12 point  review of systems is negative.    Past Medical/Surgical History:  Past Medical History:   Diagnosis Date     Chronic pain      Psoriasis      Rheumatoid arthritis(714.0)      Steroid long-term use      Past Surgical History:   Procedure Laterality Date     ARTHROPLASTY KNEE  9/20/2011    Procedure:ARTHROPLASTY KNEE; Left Total Knee Replacement ; Surgeon:BLAYNE GAYTAN; Location:[facility]     ARTHROSCOPY KNEE BILATERAL       EXTRACAPSULAR CATARACT EXTRATION WITH INTRAOCULAR LENS IMPLANT      right eye 1/2011     PHACOEMULSIFICATION CLEAR CORNEA WITH STANDARD INTRAOCULAR LENS IMPLANT  4/16/2013    Procedure: PHACOEMULSIFICATION CLEAR CORNEA WITH STANDARD INTRAOCULAR LENS IMPLANT;  LEFT EYE PHACOEMULSIFICATION CLEAR CORNEA WITH STANDARD INTRAOCULAR LENS IMPLANT ;  Surgeon: Tawanda Sanchez MD;  Location: [facility]     SURGICAL HISTORY OF -   1/19/2011    Cataract, right eye     SYNOVECTOMY HIP      not hip/ bilateral hands   She saw Dr. Sanchez in 2015. and had a L groin lesion biopsied and it was c/w psoriasis on Bx and she will be following up with him.   As far as RA, she was diagnosed in 2000, had focally erosive disease and has been on Enbrel and Remicade in the past. Prior to Dx of DLBCL she was on Humira and MTX.   FHx and Social Hx reviewed.    Allergies:  Allergies as of 05/14/2020 - Reviewed 09/17/2019   Allergen Reaction Noted     Codeine Nausea and Vomiting 01/31/2006     Ibuprofen Itching 07/06/2005     Pneumovax [pneumococcal polysaccharides] Other (See Comments) 08/07/2017     Sulfa drugs Hives 01/31/2006     Current Medications:  Current Outpatient Medications   Medication Sig Dispense  Refill     acetaminophen (TYLENOL) 325 MG tablet Take 2 tablets by mouth 3 times daily as needed. 60 tablet 0     albuterol (PROAIR HFA/PROVENTIL HFA/VENTOLIN HFA) 108 (90 Base) MCG/ACT inhaler Inhale 1 puff into the lungs 3 times daily as needed for shortness of breath / dyspnea or wheezing       amoxicillin (AMOXIL) 500 MG capsule Take 4 capsules (2 grams), by mouth, 1 hour before dental work. 8 capsule 1     amoxicillin (AMOXIL) 500 MG tablet Take 2 grams (4 tabs) by mouth one hour before dental appointment 12 tablet 1     aspirin 81 MG tablet Take 1 tablet by mouth daily.       augmented betamethasone dipropionate (DIPROLENE AF) 0.05 % cream Apply topically 2 times daily 30 g 0     azelaic acid (AZELEX) 20 % cream Apply topically 2 times daily 30 g 11     azelaic acid (AZELEX) 20 % external cream Apply topically 2 times daily 30 g 1     Azelaic Acid (FINACEA) 15 % gel Apply 0.5 inches topically See Admin Instructions massage thin film gently into afected areas morning and evening 150 g 3     benzonatate (TESSALON) 100 MG capsule Take 1 capsule (100 mg) by mouth 3 times daily as needed 42 capsule 1     calcipotriene 0.005 % OINT Can use twice daily to affected areas of psoriasis, in between topical steroid uses. 120 g 1     Calcium Carbonate-Vitamin D (CALCIUM 600+D PO) Take 1,200 mg by mouth daily.       desonide (DESOWEN) 0.05 % ointment Apply topically 2 times daily Apply to affected areas of face twice daily until resolved, then taper to once daily for one week. 60 g 1     hydroxychloroquine (PLAQUENIL) 200 MG tablet Take 2 tablets (400 mg) by mouth daily 180 tablet 0     ketoconazole (NIZORAL) 2 % cream Apply topically 2 times daily 60 g 1     ORDER FOR DME Equipment being ordered: cranial prosthesis.  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 2 Units 0     ORDER FOR DME A pair of foot orthotics. 1 Package 0     predniSONE (DELTASONE) 5 MG tablet Take 4 tablets (20 mg) daily for  2 weeks, then take 2 tablets (10 mg) daily for 2 weeks, then back to 5 mg daily. 98 tablet 0     predniSONE (DELTASONE) 5 MG tablet Take 1 tablet (5 mg) by mouth daily 90 tablet 0     triamcinolone (KENALOG) 0.1 % ointment Apply to affected area twice daily for two weeks at a time. 30 g 2        Physical Exam:  Constitutional: alert and in no distress  Eyes: No redness or discharge  Respiratory: No cough or labored breathing.  Musculoskeletal: Full range of motion in extremities.  Skin: no visible skin lesions or discoloration  Neurological: No tremors and denies headache.  Psychiatric: Mentation appears normal and affect is normal as well.  Alert and oriented x3.  The rest the comprehensive physical examination is deferred due to public health emergency video visit restrictions.      Laboratory/Imaging Studies  Outside labs reviewed.  ASSESSMENT/PLAN:  The patient is a very pleasant 74 year old woman with stage IIAE Diffuse large B cell lymphoma involving right anterior chest wall, right supraclavicular LN and eroding into sternum. She falls in the low-intermediate risk IPI group when not adjusted for age, with IPI score of 2 which with associated with 67% CR rate with treatment and 51% 5 year overall survival rate. If adjusted for age, she would fall into low risk or IPI of 0 score, associated with CR rate of 91% and 56% 5 year overall survival.  She has had an excellent clinical response after one cycle of RCHOP and is in complete radiographic CR after 3 cycles of  R-CHOP, last on 6/2/2015. She has completed 3000cGY to R anterior CW from 7/2/2015-7/22/2015 under direction of Dr. Foss in [place].  She had no e/o disease recurrence on her last CT chest form 9/2018.    1. Diffuse large B cell lymphoma -  She has no e/o disease recurrence clinically. F/up of stage II Diffuse large B cell lymphoma in years 4 and 5 includes H&P and labs every 3-6 months for 5 years, then annually and imaging as clinically  indicated. She is coming up on her 5-year ruth  in July 2020. F/up in one year, with labs if she is in [place] at that time.  She will also be getting labs locally in the interim through her rheumatologist.    2. Breast Cancer Screening - b/l screening mammogram negative 9/2019. F/up due in 09/2020. She was reminded to schedule.   3. Colon Cancer Screening - colonoscopy on 4/16/2015. The exam was normal and repeat was recommended in 5 years for screening purposes. Schedule post Coronavirus outbreak.    4. RA -  on low dose  Prednisone. She is s/p Rituxan weekly x 2 in October 2017. F/up with Dr. Montilla in  [place].     5. Low serum IgG level- recommend that she has it repeated- she is due for repeat labs with rheumatologist. If still low recommend that she see immunology for further evaluation.    6. Borderline low Hb- could be related to anemia of chronic inflammation, relatively stable compared to last year. Recommend repeating iron, TIBC, ferritin. She prefers to have that done locally in [place].  7. Intermittent lymphocytopenia- relatively stable. ? RA, vs. Prior chemotherapy and radiation.    At the end of our visit patient verbalized understanding and concurred with the plan.

## 2020-05-14 ENCOUNTER — VIRTUAL VISIT (OUTPATIENT)
Dept: ONCOLOGY | Facility: CLINIC | Age: 75
End: 2020-05-14
Payer: COMMERCIAL

## 2020-05-14 VITALS — HEIGHT: 62 IN | WEIGHT: 163 LBS | BODY MASS INDEX: 30 KG/M2

## 2020-05-14 DIAGNOSIS — D80.1 HYPOGAMMAGLOBULINEMIA (H): ICD-10-CM

## 2020-05-14 DIAGNOSIS — C83.398 DIFFUSE LARGE B-CELL LYMPHOMA OF EXTRANODAL SITE EXCLUDING SPLEEN AND OTHER SOLID ORGANS: Primary | ICD-10-CM

## 2020-05-14 DIAGNOSIS — D64.9 NORMOCYTIC ANEMIA: ICD-10-CM

## 2020-05-14 PROCEDURE — 99213 OFFICE O/P EST LOW 20 MIN: CPT | Mod: 95 | Performed by: INTERNAL MEDICINE

## 2020-05-14 RX ORDER — MONTELUKAST SODIUM 10 MG/1
TABLET ORAL
COMMUNITY
Start: 2020-03-12 | End: 2022-09-28

## 2020-05-14 RX ORDER — CETIRIZINE HYDROCHLORIDE 10 MG/1
TABLET ORAL EVERY 24 HOURS
COMMUNITY
Start: 2018-10-30

## 2020-05-14 RX ORDER — LOPERAMIDE HCL 2 MG
2 CAPSULE ORAL 4 TIMES DAILY PRN
COMMUNITY

## 2020-05-14 ASSESSMENT — MIFFLIN-ST. JEOR: SCORE: 1182.61

## 2020-05-14 ASSESSMENT — PAIN SCALES - GENERAL: PAINLEVEL: NO PAIN (0)

## 2020-05-14 NOTE — PROGRESS NOTES
"Randi Garcia is a 74 year old female who is being evaluated via a billable video visit.      The patient has been notified of following:     \"This video visit will be conducted via a call between you and your physician/provider. We have found that certain health care needs can be provided without the need for an in-person physical exam.  This service lets us provide the care you need with a video conversation.  If a prescription is necessary we can send it directly to your pharmacy.  If lab work is needed we can place an order for that and you can then stop by our lab to have the test done at a later time.    Video visits are billed at different rates depending on your insurance coverage.  Please reach out to your insurance provider with any questions.    If during the course of the call the physician/provider feels a video visit is not appropriate, you will not be charged for this service.\"    Patient has given verbal consent for Video visit? Yes    How would you like to obtain your AVS? Carrie    Patient would like the video invitation sent by: Text to cell phone: [phone]          "

## 2020-05-14 NOTE — NURSING NOTE
SYMPTOM ASSESSMENT    Pain: no    Nausea/Vomiting: no    Mouth Sores: no    Shortness of Breath: no    Smoking: no    Fever or Chills: no    Hard Stools: no    Soft Stools: yes x 2 days- taking imodium    Weight Loss: no    Weakness: no    Burning, numbness or tingling in hands or feet: no    Problems with skin or swelling: no    Memory Loss: no    Anxiety or Depression: no    Trouble Sleeping: no    Maria Del Carmen Patricia LPN on 5/14/2020 at 11:47 AM

## 2020-11-30 DIAGNOSIS — L71.9 ROSACEA: ICD-10-CM

## 2020-12-03 RX ORDER — AZELAIC ACID 0.2 G/G
CREAM CUTANEOUS 2 TIMES DAILY
Qty: 30 G | Refills: 0 | Status: SHIPPED | OUTPATIENT
Start: 2020-12-03

## 2020-12-03 NOTE — TELEPHONE ENCOUNTER
azelaic acid (AZELEX) 20 % external cream    Last Written Prescription Date:  9/13/19  Last Fill Quantity: 30g,   # refills: 1  Last Office Visit : 9/13/19  Future Office visit: none    Follow-up in 1 year    Scheduling has been notified to contact the pt for appointment.      Process 1

## 2021-01-15 ENCOUNTER — HEALTH MAINTENANCE LETTER (OUTPATIENT)
Age: 76
End: 2021-01-15

## 2021-04-09 ENCOUNTER — ANCILLARY PROCEDURE (OUTPATIENT)
Dept: ULTRASOUND IMAGING | Facility: CLINIC | Age: 76
End: 2021-04-09
Attending: INTERNAL MEDICINE
Payer: COMMERCIAL

## 2021-04-09 ENCOUNTER — ONCOLOGY VISIT (OUTPATIENT)
Dept: ONCOLOGY | Facility: CLINIC | Age: 76
End: 2021-04-09
Payer: COMMERCIAL

## 2021-04-09 VITALS
RESPIRATION RATE: 16 BRPM | HEART RATE: 80 BPM | TEMPERATURE: 98.1 F | BODY MASS INDEX: 30.42 KG/M2 | SYSTOLIC BLOOD PRESSURE: 112 MMHG | DIASTOLIC BLOOD PRESSURE: 70 MMHG | WEIGHT: 166.3 LBS | OXYGEN SATURATION: 98 %

## 2021-04-09 DIAGNOSIS — C83.398 DIFFUSE LARGE B-CELL LYMPHOMA OF EXTRANODAL SITE EXCLUDING SPLEEN AND OTHER SOLID ORGANS: ICD-10-CM

## 2021-04-09 DIAGNOSIS — R22.2 LUMP IN CHEST: Primary | ICD-10-CM

## 2021-04-09 DIAGNOSIS — R22.2 LUMP IN CHEST: ICD-10-CM

## 2021-04-09 DIAGNOSIS — D80.1 HYPOGAMMAGLOBULINEMIA (H): ICD-10-CM

## 2021-04-09 DIAGNOSIS — D64.9 NORMOCYTIC ANEMIA: ICD-10-CM

## 2021-04-09 LAB
ALBUMIN SERPL-MCNC: 3.3 G/DL (ref 3.4–5)
ALP SERPL-CCNC: 65 U/L (ref 40–150)
ALT SERPL W P-5'-P-CCNC: 24 U/L (ref 0–50)
AST SERPL W P-5'-P-CCNC: 15 U/L (ref 0–45)
BASOPHILS # BLD AUTO: 0 10E9/L (ref 0–0.2)
BASOPHILS NFR BLD AUTO: 0.4 %
BILIRUB DIRECT SERPL-MCNC: <0.1 MG/DL (ref 0–0.2)
BILIRUB SERPL-MCNC: 0.3 MG/DL (ref 0.2–1.3)
CREAT SERPL-MCNC: 0.85 MG/DL (ref 0.52–1.04)
DIFFERENTIAL METHOD BLD: ABNORMAL
EOSINOPHIL # BLD AUTO: 0.2 10E9/L (ref 0–0.7)
EOSINOPHIL NFR BLD AUTO: 1.6 %
ERYTHROCYTE [DISTWIDTH] IN BLOOD BY AUTOMATED COUNT: 15.3 % (ref 10–15)
FERRITIN SERPL-MCNC: 98 NG/ML (ref 8–252)
GFR SERPL CREATININE-BSD FRML MDRD: 65 ML/MIN/{1.73_M2}
HCT VFR BLD AUTO: 37.7 % (ref 35–47)
HGB BLD-MCNC: 11.6 G/DL (ref 11.7–15.7)
IMM GRANULOCYTES # BLD: 0.1 10E9/L (ref 0–0.4)
IMM GRANULOCYTES NFR BLD: 0.6 %
IRON SATN MFR SERPL: 11 % (ref 15–46)
IRON SERPL-MCNC: 33 UG/DL (ref 35–180)
LDH SERPL L TO P-CCNC: 201 U/L (ref 81–234)
LYMPHOCYTES # BLD AUTO: 0.8 10E9/L (ref 0.8–5.3)
LYMPHOCYTES NFR BLD AUTO: 7.7 %
MCH RBC QN AUTO: 25.6 PG (ref 26.5–33)
MCHC RBC AUTO-ENTMCNC: 30.8 G/DL (ref 31.5–36.5)
MCV RBC AUTO: 83 FL (ref 78–100)
MONOCYTES # BLD AUTO: 0.6 10E9/L (ref 0–1.3)
MONOCYTES NFR BLD AUTO: 5.9 %
NEUTROPHILS # BLD AUTO: 8.5 10E9/L (ref 1.6–8.3)
NEUTROPHILS NFR BLD AUTO: 83.8 %
PLATELET # BLD AUTO: 284 10E9/L (ref 150–450)
PROT SERPL-MCNC: 6.4 G/DL (ref 6.8–8.8)
RBC # BLD AUTO: 4.53 10E12/L (ref 3.8–5.2)
TIBC SERPL-MCNC: 290 UG/DL (ref 240–430)
WBC # BLD AUTO: 10.1 10E9/L (ref 4–11)

## 2021-04-09 PROCEDURE — 36415 COLL VENOUS BLD VENIPUNCTURE: CPT | Performed by: INTERNAL MEDICINE

## 2021-04-09 PROCEDURE — 82565 ASSAY OF CREATININE: CPT | Performed by: INTERNAL MEDICINE

## 2021-04-09 PROCEDURE — 99214 OFFICE O/P EST MOD 30 MIN: CPT | Performed by: INTERNAL MEDICINE

## 2021-04-09 PROCEDURE — 82728 ASSAY OF FERRITIN: CPT | Performed by: INTERNAL MEDICINE

## 2021-04-09 PROCEDURE — 83540 ASSAY OF IRON: CPT | Performed by: INTERNAL MEDICINE

## 2021-04-09 PROCEDURE — 80076 HEPATIC FUNCTION PANEL: CPT | Performed by: INTERNAL MEDICINE

## 2021-04-09 PROCEDURE — 85025 COMPLETE CBC W/AUTO DIFF WBC: CPT | Performed by: INTERNAL MEDICINE

## 2021-04-09 PROCEDURE — 76705 ECHO EXAM OF ABDOMEN: CPT | Performed by: RADIOLOGY

## 2021-04-09 PROCEDURE — 83550 IRON BINDING TEST: CPT | Performed by: INTERNAL MEDICINE

## 2021-04-09 PROCEDURE — 82784 ASSAY IGA/IGD/IGG/IGM EACH: CPT | Performed by: INTERNAL MEDICINE

## 2021-04-09 PROCEDURE — 83615 LACTATE (LD) (LDH) ENZYME: CPT | Performed by: INTERNAL MEDICINE

## 2021-04-09 NOTE — LETTER
4/9/2021         RE: Randi Garcia  [address] [facility] [address]        Dear Colleague,    Thank you for referring your patient, Randi Garcia, to the [facility]. Please see a copy of my visit note below.    Oncology Follow-up visit:  Date on this visit: Apr 9, 2021    Primary Care Physician: Dr. Balwinder Hutton, [place], [place]    Rheumatology team: Dr. Jevon Moreno, [place], [place]      Dx:  Diffuse large B cell lymphoma    Oncologic History:  She woke up on 3/19/2015 with R chest wall bump. CT chest with contrast on 3/31/2015 showed a soft tissue lesion inseparable from the right costosternal junction which measured 6.1x6.6cm. There was no associated mediastinal, hilar or axillary lymphadenopathy. She underwent an US and a biopsy of the chest wall lesion and pathology showed findings c/w Diffuse large B cell lymphoma, germinal center type large B-cell lymphoma. The lymphoma cells were CD20+, BCL-6+, and CD23+. BCL-2 demonstrated only scattered weak equivocal staining. There was no significant staining for CD30, CD10, MUM-1, or CD15 is noted in the cells of interest. Ki-67 demonstrated a high proliferation rate estimated at 90% of the tumor cells. Chromogenic in situ  hybridization for EBV (VÍCTOR) was  negative in the lymphomatous cells.  Bone marrow biopsy performed on 4/8/2015 showed no morphologic evidence of lymphoma. There was no immunophenotypic evidence of lymphoma.  She saw Dr. Benavidez with hematology/oncology in [facility] on 4/8/2015 and was recommended to enroll in a clinical trial, [facility]  where patients with early stage DLBCL are given RCHOPx3 followed by PET ct scan and it the PET is positive, she would get involved field radiation and Zevalin and if the PET is negaitve she would get an additional cycle of RCHOP.   She sought a second opinion with us.   PET/CT of the chest, abdomen and pelvis on 4/10/2015 showed  multiple hypermetabolic soft tissue nodules in the right anterior chest wall as well as a hypermetabolic right supraclavicular lymph node (hypermetabolic, 1.2 x 1.6 cm).  There was a 3.0 x 5.1 cm soft tissue mass at the right second sternocostal junction is hypermetabolic with a maximum SUV of 15.3 and a 0.8 x 2.0 cm soft tissue nodule between the third and fourth ribs anteriorly which was mildly hypermetabolic with a maximum SUV of 6.9. 0.7 x 1.9 and 1.4 x 2.4 cm soft tissue nodules at the fourth sternocostal junction have a maximal  SUV of 12.9 and 7.1, respectively. Additionally, there was a  2 mm right upper lobe pulmonary nodule.    Given extranodal disease, she had an LP performed at [facility] on 4/13/2015. 4cc clear CSF removed. 2 cc sent for flow cytometry. Cytology showed rare lymphocytes that showed smooth nuclear contours. Flow cytometry did not show a monoclonal B cell population.      She proceeded with cycle 1 of R-CHOP on 4/20/2015, with Neulasta support. She has received 3 cycles so far, last on 6/2/2015.  She has tolerated chemotherapy very well and her R chest wall mass has almost entirely disappeared over the course of the first 3-4 days of the first cycle.   She underwent PET/CT scan on 6/20/2015 which showed complete radiographic response to treatment.  She has completed 3000cGY to R anterior CW from 7/2/2015-7/22/2015 under direction of Dr. Foss in [place].    [facility] a [facility].    Echocardiogram 4/10/2015:  Left Ventricle EF of 60-65%. RV function normal. Mild left atrial enlargement.      History Of Present Illness:  Ms. Garcia is a 75 year old female who presents for follow-up of treated Diffuse large B cell lymphoma, as above.   She has been seeing Dr. Jevon Moreno, from rheumatology in [place], [place]. She is on 5 mg daily prednisone for RA and has not been  able to completely wean off prednisone. She had a  negative screening mammogram on September 13, 2019.  She has a history of anemia of chronic disease (secondary to RA), which has corrected with iron supplementation in the past.  She has not been on iron supplementation recently.  She is feeling well and has no health related concerns except for chronic joint pains related to RA.  She reports today that she is feeling really well.  She is moving to [facility].  She is overdue for screening mammogram.  She is also overdue for screening colonoscopy.  She has not had recent infections.  She is up-to-date with 2 doses of forward vaccinations.  Her new complaint today is a lump onto her left rib cage, couple of centimeters in diameter, somewhat soft and has been there for a few months, unchanged.  She has not had any fevers, chills or night sweats.  Weight has been stable in the last year the 166 pounds approximately.  In addition, a complete 12 point  review of systems is negative.    Past Medical/Surgical History:  Past Medical History:   Diagnosis Date     Chronic pain      Psoriasis      Rheumatoid arthritis(714.0)      Steroid long-term use      Past Surgical History:   Procedure Laterality Date     ARTHROPLASTY KNEE  9/20/2011    Procedure:ARTHROPLASTY KNEE; Left Total Knee Replacement ; Surgeon:BLAYNE GAYTAN; Location:[facility]     ARTHROSCOPY KNEE BILATERAL       EXTRACAPSULAR CATARACT EXTRATION WITH INTRAOCULAR LENS IMPLANT      right eye 1/2011     PHACOEMULSIFICATION CLEAR CORNEA WITH STANDARD INTRAOCULAR LENS IMPLANT  4/16/2013    Procedure: PHACOEMULSIFICATION CLEAR CORNEA WITH STANDARD INTRAOCULAR LENS IMPLANT;  LEFT EYE PHACOEMULSIFICATION CLEAR CORNEA WITH STANDARD INTRAOCULAR LENS IMPLANT ;  Surgeon: Tawanda Sanchez MD;  Location: [facility]     SURGICAL HISTORY OF -   1/19/2011    Cataract, right eye     SYNOVECTOMY HIP      not hip/ bilateral hands   She saw Dr. Sanchez in 2015. and had a L groin lesion biopsied and it was c/w psoriasis  on Bx and she will be following up with him.   As far as RA, she was diagnosed in 2000, had focally erosive disease and has been on Enbrel and Remicade in the past. Prior to Dx of DLBCL she was on Humira and MTX.   FHx and Social Hx reviewed.    Allergies:  Allergies as of 04/09/2021 - Reviewed 04/09/2021   Allergen Reaction Noted     Codeine Nausea and Vomiting 01/31/2006     Ibuprofen Itching 07/06/2005     Pneumovax [pneumococcal polysaccharides] Other (See Comments) 08/07/2017     Sulfa drugs Hives 01/31/2006     Current Medications:  Current Outpatient Medications   Medication Sig Dispense Refill     acetaminophen (TYLENOL) 325 MG tablet Take 2 tablets by mouth 3 times daily as needed. 60 tablet 0     albuterol (PROAIR HFA/PROVENTIL HFA/VENTOLIN HFA) 108 (90 Base) MCG/ACT inhaler Inhale 1 puff into the lungs 3 times daily as needed for shortness of breath / dyspnea or wheezing       aspirin 81 MG tablet Take 1 tablet by mouth daily.       augmented betamethasone dipropionate (DIPROLENE AF) 0.05 % cream Apply topically 2 times daily 30 g 0     azelaic acid (AZELEX) 20 % external cream Apply topically 2 times daily Call clinic to schedule follow up appointment. 30 g 0     Calcium Carbonate-Vitamin D (CALCIUM 600+D PO) Take 1,200 mg by mouth daily.       cetirizine (KLS ALLER-SINCERE) 10 MG tablet Take by mouth every 24 hours       desonide (DESOWEN) 0.05 % ointment Apply topically 2 times daily Apply to affected areas of face twice daily until resolved, then taper to once daily for one week. 60 g 1     hydroxychloroquine (PLAQUENIL) 200 MG tablet Take 2 tablets (400 mg) by mouth daily 180 tablet 0     ketoconazole (NIZORAL) 2 % cream Apply topically 2 times daily 60 g 1     loperamide (IMODIUM) 2 MG capsule Take 2 mg by mouth 4 times daily as needed for diarrhea       montelukast (SINGULAIR) 10 MG tablet        ORDER FOR DME Equipment being ordered: cranial prosthesis.  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 2 Units 0     ORDER FOR DME A pair of foot orthotics. 1 Package 0     predniSONE (DELTASONE) 5 MG tablet Take 4 tablets (20 mg) daily for 2 weeks, then take 2 tablets (10 mg) daily for 2 weeks, then back to 5 mg daily. 98 tablet 0     predniSONE (DELTASONE) 5 MG tablet Take 1 tablet (5 mg) by mouth daily 90 tablet 0     triamcinolone (KENALOG) 0.1 % ointment Apply to affected area twice daily for two weeks at a time. 30 g 2     amoxicillin (AMOXIL) 500 MG capsule Take 4 capsules (2 grams), by mouth, 1 hour before dental work. (Patient not taking: Reported on 5/14/2020) 8 capsule 1     amoxicillin (AMOXIL) 500 MG tablet Take 2 grams (4 tabs) by mouth one hour before dental appointment (Patient not taking: Reported on 5/14/2020) 12 tablet 1     Azelaic Acid (FINACEA) 15 % gel Apply 0.5 inches topically See Admin Instructions massage thin film gently into afected areas morning and evening (Patient not taking: Reported on 5/14/2020) 150 g 3     benzonatate (TESSALON) 100 MG capsule Take 1 capsule (100 mg) by mouth 3 times daily as needed (Patient not taking: Reported on 5/14/2020) 42 capsule 1     calcipotriene 0.005 % OINT Can use twice daily to affected areas of psoriasis, in between topical steroid uses. (Patient not taking: Reported on 5/14/2020) 120 g 1        Physical Exam:  Wt Readings from Last 5 Encounters:   04/09/21 75.4 kg (166 lb 4.8 oz)   05/14/20 73.9 kg (163 lb)   09/17/19 75.4 kg (166 lb 3.2 oz)   03/21/19 74.9 kg (165 lb 3 oz)   09/14/18 72.1 kg (159 lb)         GENERAL APPEARANCE: alert and no distress  HEENT: PEERLA, no neck asymmetry or masses  Lymphatics: No cervical, supraclavicular, axillary lymphadenopathy bilaterally    CV: S1S2 reg no murmur  Respiratory: CTA b/l  GI: soft,  BS+, NT, ND  Extremities: no edema.    SKIN: no suspicious lesions or rashes    PSYCHIATRIC: mentation appears normal and affect normal  Neuro: gait intact. axox3  Chest wall: Right  underneath the left rib cage in midclavicular line there is a 2 x 2 centimeter soft subcutaneous tissue mass, movable, nontender.    Laboratory/Imaging Studies  Component      Latest Ref Rng & Units 4/9/2021   WBC      4.0 - 11.0 10e9/L 10.1   RBC Count      3.8 - 5.2 10e12/L 4.53   Hemoglobin      11.7 - 15.7 g/dL 11.6 (L)   Hematocrit      35.0 - 47.0 % 37.7   MCV      78 - 100 fl 83   MCH      26.5 - 33.0 pg 25.6 (L)   MCHC      31.5 - 36.5 g/dL 30.8 (L)   RDW      10.0 - 15.0 % 15.3 (H)   Platelet Count      150 - 450 10e9/L 284   Diff Method       Automated Method   % Neutrophils      % 83.8   % Lymphocytes      % 7.7   % Monocytes      % 5.9   % Eosinophils      % 1.6   % Basophils      % 0.4   % Immature Granulocytes      % 0.6   Absolute Neutrophil      1.6 - 8.3 10e9/L 8.5 (H)   Absolute Lymphocytes      0.8 - 5.3 10e9/L 0.8   Absolute Monocytes      0.0 - 1.3 10e9/L 0.6   Absolute Eosinophils      0.0 - 0.7 10e9/L 0.2   Absolute Basophils      0.0 - 0.2 10e9/L 0.0   Abs Immature Granulocytes      0 - 0.4 10e9/L 0.1   Bilirubin Direct      0.0 - 0.2 mg/dL <0.1   Bilirubin Total      0.2 - 1.3 mg/dL 0.3   Albumin      3.4 - 5.0 g/dL 3.3 (L)   Protein Total      6.8 - 8.8 g/dL 6.4 (L)   Alkaline Phosphatase      40 - 150 U/L 65   ALT      0 - 50 U/L 24   AST      0 - 45 U/L 15   Creatinine      0.52 - 1.04 mg/dL 0.85   GFR Estimate      >60 mL/min/1.73:m2 PENDING   GFR Estimate If Black      >60 mL/min/1.73:m2 PENDING   Iron      35 - 180 ug/dL 33 (L)   Iron Binding Cap      240 - 430 ug/dL 290   Iron Saturation Index      15 - 46 % 11 (L)   Lactate Dehydrogenase      81 - 234 U/L 201   Ferritin      8 - 252 ng/mL 98       ASSESSMENT/PLAN:  The patient is a very pleasant 75 year old woman with stage IIAE Diffuse large B cell lymphoma involving right anterior chest wall, right supraclavicular LN and eroding into sternum. She falls in the low-intermediate risk IPI group when not adjusted for age, with IPI score  of 2 which with associated with 67% CR rate with treatment and 51% 5 year overall survival rate. If adjusted for age, she would fall into low risk or IPI of 0 score, associated with CR rate of 91% and 56% 5 year overall survival.  She has had an excellent clinical response after one cycle of RCHOP and is in complete radiographic CR after 3 cycles of  R-CHOP, last on 6/2/2015. She has completed 3000cGY to R anterior CW from 7/2/2015-7/22/2015 under direction of Dr. Foss in [place].  She had no e/o disease recurrence on her last CT chest form 9/2018.    1. Diffuse large B cell lymphoma -  She is over 5 years out from her treatment completion (from July 2015).  She will need to be seen once a year with labs and physical exam, to monitor for late effects of chemotherapy.  She will be establishing care with a new healthcare provider in [place], [place].  She may also come back and see us on an annual basis.    2.  New lump times a couple months underneath left rib cage, stable in size per patient and patient has had no constitutional symptoms.  This is likely a lipoma, but would recommend further evaluation with focused ultrasound of this area.  Order was placed and we will try to schedule today as she is going out of [place] on [place].  If she is not able to complete the study today, she was recommended to complete it locally.    3. Borderline low Hb- could be related to anemia of chronic inflammation, with a component of anemia of iron deficiency since ferritin is under 100 and iron and Fe% are low.  Recommend oral iron supplementation with ferrous sulfate 325 mg p.o. every other day.  Again, screening colonoscopy recommended to rule out occult blood loss from lower GI tract.    4.  Mild neutrophilia-likely related to prednisone use.  5.  History of low IgG levels no recurrent infections.  Today's serum IgG level pending.    6. Breast Cancer Screening -due for b/l screening mammogram and was reminded to  schedule.    7. Colon Cancer Screening - colonoscopy on 4/16/2015. The exam was normal and repeat was recommended in 5 years for screening purposes.  She is overdue for colonoscopy and was reminded to schedule.  8. RA -  on low dose  Prednisone. She is s/p Rituxan weekly x 2 in October 2017.   At the end of our visit patient verbalized understanding and concurred with the plan.                Again, thank you for allowing me to participate in the care of your patient.        Sincerely,        Sue Garcia MD, MD

## 2021-04-09 NOTE — PROGRESS NOTES
Oncology Follow-up visit:  Date on this visit: Apr 9, 2021    Primary Care Physician: Dr. Balwinder Hutton, [place], [place]    Rheumatology team: Dr. Jevon Moreno [place]      Dx:  Diffuse large B cell lymphoma    Oncologic History:  She woke up on 3/19/2015 with R chest wall bump. CT chest with contrast on 3/31/2015 showed a soft tissue lesion inseparable from the right costosternal junction which measured 6.1x6.6cm. There was no associated mediastinal, hilar or axillary lymphadenopathy. She underwent an US and a biopsy of the chest wall lesion and pathology showed findings c/w Diffuse large B cell lymphoma, germinal center type large B-cell lymphoma. The lymphoma cells were CD20+, BCL-6+, and CD23+. BCL-2 demonstrated only scattered weak equivocal staining. There was no significant staining for CD30, CD10, MUM-1, or CD15 is noted in the cells of interest. Ki-67 demonstrated a high proliferation rate estimated at 90% of the tumor cells. Chromogenic in situ  hybridization for EBV (VÍCTOR) was  negative in the lymphomatous cells.  Bone marrow biopsy performed on 4/8/2015 showed no morphologic evidence of lymphoma. There was no immunophenotypic evidence of lymphoma.  She saw Dr. Benavidez with hematology/oncology in [facility] on 4/8/2015 and was recommended to enroll in a clinical trial, S[facility]  where patients with early stage DLBCL are given RCHOPx3 followed by PET ct scan and it the PET is positive, she would get involved field radiation and Zevalin and if the PET is negaitve she would get an additional cycle of RCHOP.   She sought a second opinion with us.   PET/CT of the chest, abdomen and pelvis on 4/10/2015 showed multiple hypermetabolic soft tissue nodules in the right anterior chest wall as well as a hypermetabolic right supraclavicular lymph node (hypermetabolic, 1.2 x 1.6 cm).  There was a 3.0 x 5.1 cm soft tissue mass at the right second sternocostal junction is hypermetabolic with a  maximum SUV of 15.3 and a 0.8 x 2.0 cm soft tissue nodule between the third and fourth ribs anteriorly which was mildly hypermetabolic with a maximum SUV of 6.9. 0.7 x 1.9 and 1.4 x 2.4 cm soft tissue nodules at the fourth sternocostal junction have a maximal  SUV of 12.9 and 7.1, respectively. Additionally, there was a  2 mm right upper lobe pulmonary nodule.    Given extranodal disease, she had an LP performed at [facility] on 4/13/2015. 4cc clear CSF removed. 2 cc sent for flow cytometry. Cytology showed rare lymphocytes that showed smooth nuclear contours. Flow cytometry did not show a monoclonal B cell population.      She proceeded with cycle 1 of R-CHOP on 4/20/2015, with Neulasta support. She has received 3 cycles so far, last on 6/2/2015.  She has tolerated chemotherapy very well and her R chest wall mass has almost entirely disappeared over the course of the first 3-4 days of the first cycle.   She underwent PET/CT scan on 6/20/2015 which showed complete radiographic response to treatment.  She has completed 3000cGY to R anterior CW from 7/2/2015-7/22/2015 under direction of Dr. Foss in [place].    She was noted to have a mildly low IgG in [email or web address] [facility] on 4/27/2018.    Echocardiogram 4/10/2015:  Left Ventricle EF of 60-65%. RV function normal. Mild left atrial enlargement.      History Of Present Illness:  Ms. Garcia is a 75 year old female who presents for follow-up of treated Diffuse large B cell lymphoma, as above.   She has been seeing Dr. Jevon Moreno, from rheumatology in [place], [place]. She is on 5 mg daily prednisone for RA and has not been  able to completely wean off prednisone. She had a negative screening mammogram on September 13, 2019.  She has a history of anemia of chronic disease (secondary to RA), which has corrected with iron supplementation in the past.  She has not been on iron supplementation recently.  She is feeling well and has no health related concerns  except for chronic joint pains related to RA.  She reports today that she is feeling really well.  She is moving to [facility].  She is overdue for screening mammogram.  She is also overdue for screening colonoscopy.  She has not had recent infections.  She is up-to-date with 2 doses of forward vaccinations.  Her new complaint today is a lump onto her left rib cage, couple of centimeters in diameter, somewhat soft and has been there for a few months, unchanged.  She has not had any fevers, chills or night sweats.  Weight has been stable in the last year the 166 pounds approximately.  In addition, a complete 12 point  review of systems is negative.    Past Medical/Surgical History:  Past Medical History:   Diagnosis Date     Chronic pain      Psoriasis      Rheumatoid arthritis(714.0)      Steroid long-term use      Past Surgical History:   Procedure Laterality Date     ARTHROPLASTY KNEE  9/20/2011    Procedure:ARTHROPLASTY KNEE; Left Total Knee Replacement ; Surgeon:BLAYNE GAYTAN; Location:[facility]     ARTHROSCOPY KNEE BILATERAL       EXTRACAPSULAR CATARACT EXTRATION WITH INTRAOCULAR LENS IMPLANT      right eye 1/2011     PHACOEMULSIFICATION CLEAR CORNEA WITH STANDARD INTRAOCULAR LENS IMPLANT  4/16/2013    Procedure: PHACOEMULSIFICATION CLEAR CORNEA WITH STANDARD INTRAOCULAR LENS IMPLANT;  LEFT EYE PHACOEMULSIFICATION CLEAR CORNEA WITH STANDARD INTRAOCULAR LENS IMPLANT ;  Surgeon: Tawanda Sanchez MD;  Location: [facility]     SURGICAL HISTORY OF -   1/19/2011    Cataract, right eye     SYNOVECTOMY HIP      not hip/ bilateral hands   She saw Dr. Sanchez in 2015. and had a L groin lesion biopsied and it was c/w psoriasis on Bx and she will be following up with him.   As far as RA, she was diagnosed in 2000, had focally erosive disease and has been on Enbrel and Remicade in the past. Prior to Dx of DLBCL she was on Humira and MTX.   FHx and Social Hx reviewed.    Allergies:  Allergies as of 04/09/2021 -  Reviewed 04/09/2021   Allergen Reaction Noted     Codeine Nausea and Vomiting 01/31/2006     Ibuprofen Itching 07/06/2005     Pneumovax [pneumococcal polysaccharides] Other (See Comments) 08/07/2017     Sulfa drugs Hives 01/31/2006     Current Medications:  Current Outpatient Medications   Medication Sig Dispense Refill     acetaminophen (TYLENOL) 325 MG tablet Take 2 tablets by mouth 3 times daily as needed. 60 tablet 0     albuterol (PROAIR HFA/PROVENTIL HFA/VENTOLIN HFA) 108 (90 Base) MCG/ACT inhaler Inhale 1 puff into the lungs 3 times daily as needed for shortness of breath / dyspnea or wheezing       aspirin 81 MG tablet Take 1 tablet by mouth daily.       augmented betamethasone dipropionate (DIPROLENE AF) 0.05 % cream Apply topically 2 times daily 30 g 0     azelaic acid (AZELEX) 20 % external cream Apply topically 2 times daily Call clinic to schedule follow up appointment. 30 g 0     Calcium Carbonate-Vitamin D (CALCIUM 600+D PO) Take 1,200 mg by mouth daily.       cetirizine (KLS ALLER-SINCERE) 10 MG tablet Take by mouth every 24 hours       desonide (DESOWEN) 0.05 % ointment Apply topically 2 times daily Apply to affected areas of face twice daily until resolved, then taper to once daily for one week. 60 g 1     hydroxychloroquine (PLAQUENIL) 200 MG tablet Take 2 tablets (400 mg) by mouth daily 180 tablet 0     ketoconazole (NIZORAL) 2 % cream Apply topically 2 times daily 60 g 1     loperamide (IMODIUM) 2 MG capsule Take 2 mg by mouth 4 times daily as needed for diarrhea       montelukast (SINGULAIR) 10 MG tablet        ORDER FOR DME Equipment being ordered: cranial prosthesis.  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 2 Units 0     ORDER FOR DME A pair of foot orthotics. 1 Package 0     predniSONE (DELTASONE) 5 MG tablet Take 4 tablets (20 mg) daily for 2 weeks, then take 2 tablets (10 mg) daily for 2 weeks, then back to 5 mg daily. 98 tablet 0     predniSONE (DELTASONE) 5 MG  tablet Take 1 tablet (5 mg) by mouth daily 90 tablet 0     triamcinolone (KENALOG) 0.1 % ointment Apply to affected area twice daily for two weeks at a time. 30 g 2     amoxicillin (AMOXIL) 500 MG capsule Take 4 capsules (2 grams), by mouth, 1 hour before dental work. (Patient not taking: Reported on 5/14/2020) 8 capsule 1     amoxicillin (AMOXIL) 500 MG tablet Take 2 grams (4 tabs) by mouth one hour before dental appointment (Patient not taking: Reported on 5/14/2020) 12 tablet 1     Azelaic Acid (FINACEA) 15 % gel Apply 0.5 inches topically See Admin Instructions massage thin film gently into afected areas morning and evening (Patient not taking: Reported on 5/14/2020) 150 g 3     benzonatate (TESSALON) 100 MG capsule Take 1 capsule (100 mg) by mouth 3 times daily as needed (Patient not taking: Reported on 5/14/2020) 42 capsule 1     calcipotriene 0.005 % OINT Can use twice daily to affected areas of psoriasis, in between topical steroid uses. (Patient not taking: Reported on 5/14/2020) 120 g 1        Physical Exam:  Wt Readings from Last 5 Encounters:   04/09/21 75.4 kg (166 lb 4.8 oz)   05/14/20 73.9 kg (163 lb)   09/17/19 75.4 kg (166 lb 3.2 oz)   03/21/19 74.9 kg (165 lb 3 oz)   09/14/18 72.1 kg (159 lb)         GENERAL APPEARANCE: alert and no distress  HEENT: PEERLA, no neck asymmetry or masses  Lymphatics: No cervical, supraclavicular, axillary lymphadenopathy bilaterally    CV: S1S2 reg no murmur  Respiratory: CTA b/l  GI: soft,  BS+, NT, ND  Extremities: no edema.    SKIN: no suspicious lesions or rashes    PSYCHIATRIC: mentation appears normal and affect normal  Neuro: gait intact. axox3  Chest wall: Right underneath the left rib cage in midclavicular line there is a 2 x 2 centimeter soft subcutaneous tissue mass, movable, nontender.    Laboratory/Imaging Studies  Component      Latest Ref Rng & Units 4/9/2021   WBC      4.0 - 11.0 10e9/L 10.1   RBC Count      3.8 - 5.2 10e12/L 4.53   Hemoglobin       11.7 - 15.7 g/dL 11.6 (L)   Hematocrit      35.0 - 47.0 % 37.7   MCV      78 - 100 fl 83   MCH      26.5 - 33.0 pg 25.6 (L)   MCHC      31.5 - 36.5 g/dL 30.8 (L)   RDW      10.0 - 15.0 % 15.3 (H)   Platelet Count      150 - 450 10e9/L 284   Diff Method       Automated Method   % Neutrophils      % 83.8   % Lymphocytes      % 7.7   % Monocytes      % 5.9   % Eosinophils      % 1.6   % Basophils      % 0.4   % Immature Granulocytes      % 0.6   Absolute Neutrophil      1.6 - 8.3 10e9/L 8.5 (H)   Absolute Lymphocytes      0.8 - 5.3 10e9/L 0.8   Absolute Monocytes      0.0 - 1.3 10e9/L 0.6   Absolute Eosinophils      0.0 - 0.7 10e9/L 0.2   Absolute Basophils      0.0 - 0.2 10e9/L 0.0   Abs Immature Granulocytes      0 - 0.4 10e9/L 0.1   Bilirubin Direct      0.0 - 0.2 mg/dL <0.1   Bilirubin Total      0.2 - 1.3 mg/dL 0.3   Albumin      3.4 - 5.0 g/dL 3.3 (L)   Protein Total      6.8 - 8.8 g/dL 6.4 (L)   Alkaline Phosphatase      40 - 150 U/L 65   ALT      0 - 50 U/L 24   AST      0 - 45 U/L 15   Creatinine      0.52 - 1.04 mg/dL 0.85   GFR Estimate      >60 mL/min/1.73:m2 PENDING   GFR Estimate If Black      >60 mL/min/1.73:m2 PENDING   Iron      35 - 180 ug/dL 33 (L)   Iron Binding Cap      240 - 430 ug/dL 290   Iron Saturation Index      15 - 46 % 11 (L)   Lactate Dehydrogenase      81 - 234 U/L 201   Ferritin      8 - 252 ng/mL 98       ASSESSMENT/PLAN:  The patient is a very pleasant 75 year old woman with stage IIAE Diffuse large B cell lymphoma involving right anterior chest wall, right supraclavicular LN and eroding into sternum. She falls in the low-intermediate risk IPI group when not adjusted for age, with IPI score of 2 which with associated with 67% CR rate with treatment and 51% 5 year overall survival rate. If adjusted for age, she would fall into low risk or IPI of 0 score, associated with CR rate of 91% and 56% 5 year overall survival.  She has had an excellent clinical response after one cycle of RCHOP  and is in complete radiographic CR after 3 cycles of  R-CHOP, last on 6/2/2015. She has completed 3000cGY to R anterior CW from 7/2/2015-7/22/2015 under direction of Dr. Foss in [place].  She had no e/o disease recurrence on her last CT chest form 9/2018.    1. Diffuse large B cell lymphoma -  She is over 5 years out from her treatment completion (from July 2015).  She will need to be seen once a year with labs and physical exam, to monitor for late effects of chemotherapy.  She will be establishing care with a new healthcare provider in [place], [place].  She may also come back and see us on an annual basis.    2.  New lump times a couple months underneath left rib cage, stable in size per patient and patient has had no constitutional symptoms.  This is likely a lipoma, but would recommend further evaluation with focused ultrasound of this area.  Order was placed and we will try to schedule today as she is going out of state on Monday.  If she is not able to complete the study today, she was recommended to complete it locally.    3. Borderline low Hb- could be related to anemia of chronic inflammation, with a component of anemia of iron deficiency since ferritin is under 100 and iron and Fe% are low.  Recommend oral iron supplementation with ferrous sulfate 325 mg p.o. every other day.  Again, screening colonoscopy recommended to rule out occult blood loss from lower GI tract.    4.  Mild neutrophilia-likely related to prednisone use.  5.  History of low IgG levels no recurrent infections.  Today's serum IgG level pending.    6. Breast Cancer Screening -due for b/l screening mammogram and was reminded to schedule.    7. Colon Cancer Screening - colonoscopy on 4/16/2015. The exam was normal and repeat was recommended in 5 years for screening purposes.  She is overdue for colonoscopy and was reminded to schedule.  8. RA -  on low dose  Prednisone. She is s/p Rituxan weekly x 2 in October 2017.   At the end of  our visit patient verbalized understanding and concurred with the plan.    Addendum:  US lump showed findings c/w lipoma.  Serum IgG level over 400.    Addendum: Outside Labs 6/8/21:  WBC 7.3, Hb 10.9 MCV 84 (nromal range ), plt 312. Absolute differential counts within normal limits. LFTs unremarkable.

## 2021-04-09 NOTE — NURSING NOTE
"Oncology Rooming Note    April 9, 2021 10:32 AM   Randi Garcia is a 75 year old female who presents for:    Chief Complaint   Patient presents with     Oncology Clinic Visit     Folow up     Initial Vitals: /70 (BP Location: Right arm)   Pulse 80   Temp 98.1  F (36.7  C) (Oral)   Resp 16   Wt 75.4 kg (166 lb 4.8 oz)   SpO2 98%   BMI 30.42 kg/m   Estimated body mass index is 30.42 kg/m  as calculated from the following:    Height as of 5/14/20: 1.575 m (5' 2\").    Weight as of this encounter: 75.4 kg (166 lb 4.8 oz). Body surface area is 1.82 meters squared.  Data Unavailable Comment: aches related to RA   No LMP recorded. Patient is postmenopausal.  Allergies reviewed: Yes  Medications reviewed: Yes    Medications: Medication refills not needed today.  Pharmacy name entered into EPIC:    [facility] - [facility], [place] - [facility]      Maria Del Carmen Patricia LPN                "

## 2021-04-12 LAB — IGG SERPL-MCNC: 462 MG/DL (ref 610–1616)

## 2021-08-24 ENCOUNTER — TRANSFERRED RECORDS (OUTPATIENT)
Dept: HEALTH INFORMATION MANAGEMENT | Facility: CLINIC | Age: 76
End: 2021-08-24

## 2021-09-04 ENCOUNTER — HEALTH MAINTENANCE LETTER (OUTPATIENT)
Age: 76
End: 2021-09-04

## 2021-09-20 ENCOUNTER — TELEPHONE (OUTPATIENT)
Dept: OPHTHALMOLOGY | Facility: CLINIC | Age: 76
End: 2021-09-20

## 2021-09-20 ENCOUNTER — TRANSCRIBE ORDERS (OUTPATIENT)
Dept: OTHER | Age: 76
End: 2021-09-20

## 2021-09-20 DIAGNOSIS — H47.011 ISCHEMIC OPTIC NEUROPATHY OF RIGHT EYE: Primary | ICD-10-CM

## 2021-09-20 NOTE — TELEPHONE ENCOUNTER
Called pt to schedule appointment with Dr. Martin,     Pt is now scheduled for 9/24/21 @ 10am.    Confirmed location of [facility] clinic. Advised that eye clinic appointments can last as long as 2-4 hours, and if dilation is required then vision can be blurry for up to 4-6 hours.     Maria Esther Franco on 9/20/2021 at 2:29 PM

## 2021-09-24 ENCOUNTER — OFFICE VISIT (OUTPATIENT)
Dept: OPHTHALMOLOGY | Facility: CLINIC | Age: 76
End: 2021-09-24
Attending: OPHTHALMOLOGY
Payer: COMMERCIAL

## 2021-09-24 DIAGNOSIS — H53.40 VISUAL FIELD DEFECT: ICD-10-CM

## 2021-09-24 DIAGNOSIS — Z79.899 OTHER LONG TERM (CURRENT) DRUG THERAPY: ICD-10-CM

## 2021-09-24 DIAGNOSIS — H47.011 ISCHEMIC OPTIC NEUROPATHY OF RIGHT EYE: Primary | ICD-10-CM

## 2021-09-24 DIAGNOSIS — H35.89 OTHER SPECIFIED RETINAL DISORDERS: ICD-10-CM

## 2021-09-24 DIAGNOSIS — H53.10 SUBJECTIVE VISUAL DISTURBANCE: ICD-10-CM

## 2021-09-24 PROCEDURE — G0463 HOSPITAL OUTPT CLINIC VISIT: HCPCS

## 2021-09-24 PROCEDURE — 92083 EXTENDED VISUAL FIELD XM: CPT | Performed by: OPHTHALMOLOGY

## 2021-09-24 PROCEDURE — 92133 CPTRZD OPH DX IMG PST SGM ON: CPT | Performed by: OPHTHALMOLOGY

## 2021-09-24 PROCEDURE — 99205 OFFICE O/P NEW HI 60 MIN: CPT | Mod: GC | Performed by: OPHTHALMOLOGY

## 2021-09-24 ASSESSMENT — VISUAL ACUITY
CORRECTION_TYPE: GLASSES
OS_CC: 20/20
OD_CC+: +2
OD_CC: 20/50
METHOD: SNELLEN - LINEAR

## 2021-09-24 ASSESSMENT — SLIT LAMP EXAM - LIDS
COMMENTS: NORMAL
COMMENTS: NORMAL

## 2021-09-24 ASSESSMENT — TONOMETRY
OD_IOP_MMHG: 11
IOP_METHOD: ICARE
OS_IOP_MMHG: 15

## 2021-09-24 ASSESSMENT — EXTERNAL EXAM - RIGHT EYE: OD_EXAM: NORMAL

## 2021-09-24 ASSESSMENT — EXTERNAL EXAM - LEFT EYE: OS_EXAM: NORMAL

## 2021-09-24 ASSESSMENT — CUP TO DISC RATIO
OD_RATIO: 0.05
OS_RATIO: 0.15

## 2021-09-24 ASSESSMENT — CONF VISUAL FIELD
OS_NORMAL: 1
OD_NORMAL: 1

## 2021-09-24 NOTE — LETTER
2021    RE: Randi Garcia  : 1945  MRN: [id number]    Dear Dr. Brown,    Thank you for referring your patient, Randi Garcia, to my neuro-ophthalmology clinic recently.  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     She does not have any identifiable risk factors beyond her cup-to-disc ratio in the left eye and her age though she has not had A1c or lipids checked recently. Discussed that age >60 is still a significant risk factor for NAION. She has no symptoms consistent with giant cell arteritis and had normal CRP and ESR after onset of symptoms. She reportedly had a CT of the head that was unremarkable per her report.    Discuss that studies have demonstrated anywhere from a 15-40% of NAION  In the fellow eye and that there are unfortunately no evidence based treatments at this time.    Will check Hgb A1c and lipid panel and recommended that she establish with a PCP while she is here in [place]    Recommend follow-up with me in 2-3 months to ensure resolution of disc edema, acuity is not worsening in the right eye, and that there is no evidence of new pathology in the fellow eye. If she is not here in [place] (she travels between here and [place]) she can follow-up with Dr. Brown.     Letters to Dr. Hamlet Brown (fax [phone])    Randi Garcia is a pleasant 75 year old White female who presents to my neuro-ophthalmology clinic today on referral from Dr. Gar Retina Specialist for evaluation of ischemic optic neuropathy.     HPI:  Estefani lives in [place] and is back in town for doctors visits and to see family.. She used to live in [place]. She first noticed 2021 that her right eye started seeing funny. This  change occurred during the day while she was out driving.   She has a fuzziness/cloudiness in the lower half of her visual field in her right eye. This was clearly noted the following day after symptom onset when she covered her left eye to carefully evaluate what was going on with her right eye. She sought the care of ophthalmology specialists in [place].    Around the time of her symptoms - no headaches, pain in the muscle in the back side of her right neck. No jaw claudication. No scalp tenderness. No black outs or gray outs of vision and no diplopia.     She saw Dr. Gar first, general ophthalmologist and started patient on the medrol dose-brant.     Then she saw a retina specialist Dr. Brown, who ordered ESR, CRP and temporal artery ultrasound all of which were reportedly within normal limits.   Labs 9/4/2021  Platelets 311 nl 150-400  CRP 0.8 mg/dL nl <0.9 mgdL  ESR 9 mm/hr     I did see radiology interpretation of an ultrasound of the bilateral temporal arteries.  No evidence of temporal arteritis was found.  This was performed on September 11, 2021.    I reviewed very detailed excellent documentation from this 9/3/21 visit including colored fundus photographs that showed moderate optic disc swelling of the right optic nerve with a superior peripapillary hemorrhage okay.  There was documentation that at that time the patient denied any symptoms to suggest giant cell arteritis.  Visual acuity at that time was 20/40 in the right eye.      On September 17, 2021 the patient followed up with her retina specialist.  Acuity in the right eye again was 20/40 +2.  There was resolution of a superior.  Papillary splinter hemorrhage in the right eye along with some interval development of pallor of the optic nerve head.    Since symptom onset she has noticed very little change. She was placed on a medrol dose-pack. She feels that her visual field cut in the right eye may have been somewhat darker prior to starting  "steroids, but otherwise little change since first noticed. No symptoms in her left eye.  Today again the patient denies any symptoms suggestive of giant cell arteritis.    Referred her here for neuro-opthhalmology when she shared she would be coming to [place].     Medications:  81mg aspirin  hydroxychloroquine for rheumatoid arthritis - on for >20 years   Oral Prednisone 5mg daily for RA     Dr. Brown - Retina Specialist   Dr. Gar - Comprehensive ophthalmologist     Medical history significant for:  Ocular Migraine  B-Cell lmyphoma 6 years in remission - treated here in [facility].  No HTN, no diabetes HLD.     The patient is presenting with an acute illness that potentially poses a threat to life or bodily function (vision).    Independent historians:  Daughter here today    Review of outside testing:  August 19th labs   CRP 5.5 mg/dL ESR 13    Labs 9/4/2021  Platelets 311 nl 150-400  CRP 0.8 mg/dL nl <0.9 mgdL  ESR 9 mm/hr     Temporal Artery Ultrasound 9/11/2021   No halo sign seen,   Impression \" no evidence of temporal arteritis seen in this study\"    My interpretation performed today of outside testing:  See above review of color fundus photos    Review of outside clinical notes:  Faxed Records From Dr. 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     Family history / social history:  No pertinent family history  Smoker in early 20s quit about 52 years ago     Exam:  Today with visual acuity 20/50 right eye 20/20 left eye with APD in the right eye, and decreased color in the right eye 7/11 ishihara plates compared to full color plates " in the left eye.  Slit-lamp examination was unremarkable bilaterally aside from posterior chamber intraocular lenses in good location in both eyes. There was trace posterior capsular opacity in both eyes.  There is mild superior segmental pallor of the right optic nerve head and mild diffuse edema (dramatically improved from photographs from September 30, 2021.  The left optic nerve had a cup-to-disc ratio of 0.15 and was normal-appearing.    Tests ordered and interpreted today:  VF:  right eye: good reliability inferior altitudinal defect   Left eye: good reliability, non-specific mild depression inferiorly     OCT RNFL:  Right eye: moderate thickening of the inferior RNFL   Left eye: normal thickness compared to age match controls        Again, thank you for trusting me with the care of your patient.  For further exam details, please feel free to contact our office for additional records.  If you wish to contact me regarding this patient please email me at [email or web address] or give my clinic a call to arrange a phone conversation.    Sincerely,    Francis Martin MD  , Neuro-Ophthalmology and Adult Strabismus Surgery  The Luis Decker Chair in Neuro-Ophthalmology  Department of Ophthalmology and Visual Neurosciences  [facility]    DX: non-arteritic anterior ischemic optic neuropathy

## 2021-09-24 NOTE — NURSING NOTE
Chief Complaints and History of Present Illnesses   Patient presents with     Blurred Vision Evaluation     Chief Complaint(s) and History of Present Illness(es)     Blurred Vision Evaluation               Comments     Randi Garcia is a 75 year old female who presents today for    1.  Diffuse large B cell lymphoma    Chief complaint: vision loss, right eye.   Sudden onset. Relies on monocular closure of right eye.   Currently taking 5 mg prednisone every day and tolerating well.   Jean Marie DU 9:55 AM September 24, 2021

## 2021-09-24 NOTE — PROGRESS NOTES
1. Nonarteritic ischemic optic neuropathy right eye  - presentation with disc edema, peripapillary hemorrhage, altitudinal defect, and crowded nerves indicates the diagnosis of NAION.     She does not have any identifiable risk factors beyond her cup-to-disc ratio in the left eye and her age though she has not had A1c or lipids checked recently. Discussed that age >60 is still a significant risk factor for NAION. She has no symptoms consistent with giant cell arteritis and had normal CRP and ESR after onset of symptoms. She reportedly had a CT of the head that was unremarkable per her report.    Discuss that studies have demonstrated anywhere from a 15-40% of NAION  In the fellow eye and that there are unfortunately no evidence based treatments at this time.    Will check Hgb A1c and lipid panel and recommended that she establish with a PCP while she is here in [place]    Recommend follow-up with me in 2-3 months to ensure resolution of disc edema, acuity is not worsening in the right eye, and that there is no evidence of new pathology in the fellow eye. If she is not here in [place] (she travels between here and [place]) she can follow-up with Dr. Brown.     Letters to Dr. Hamlet Brown (fax [phone])    Randi Garcia is a pleasant 75 year old White female who presents to my neuro-ophthalmology clinic today on referral from Dr. Gar Retina Specialist for evaluation of ischemic optic neuropathy.     HPI:  Estefani lives in [place] and is back in town for doctors visits and to see family.. She used to live in [place]. She first noticed 8/28/2021 Saturday that her right eye started seeing funny. This change occurred during the day while she was out driving.   She has a fuzziness/cloudiness in the lower half of her visual field in her right eye. This was clearly noted the following day after symptom onset when she covered her left eye to carefully evaluate what was going on with her right eye. She  sought the care of ophthalmology specialists in [place].    Around the time of her symptoms - no headaches, pain in the muscle in the back side of her right neck. No jaw claudication. No scalp tenderness. No black outs or gray outs of vision and no diplopia.     She saw Dr. Gar first, general ophthalmologist and started patient on the medrol dose-brant.     Then she saw a retina specialist Dr. Brown, who ordered ESR, CRP and temporal artery ultrasound all of which were reportedly within normal limits.   Labs 9/4/2021  Platelets 311 nl 150-400  CRP 0.8 mg/dL nl <0.9 mgdL  ESR 9 mm/hr     I did see radiology interpretation of an ultrasound of the bilateral temporal arteries.  No evidence of temporal arteritis was found.  This was performed on September 11, 2021.    I reviewed very detailed excellent documentation from this 9/3/21 visit including colored fundus photographs that showed moderate optic disc swelling of the right optic nerve with a superior peripapillary hemorrhage okay.  There was documentation that at that time the patient denied any symptoms to suggest giant cell arteritis.  Visual acuity at that time was 20/40 in the right eye.      On September 17, 2021 the patient followed up with her retina specialist.  Acuity in the right eye again was 20/40 +2.  There was resolution of a superior.  Papillary splinter hemorrhage in the right eye along with some interval development of pallor of the optic nerve head.    Since symptom onset she has noticed very little change. She was placed on a medrol dose-pack. She feels that her visual field cut in the right eye may have been somewhat darker prior to starting steroids, but otherwise little change since first noticed. No symptoms in her left eye.  Today again the patient denies any symptoms suggestive of giant cell arteritis.    Referred her here for neuro-opthhalmology when she shared she would be coming to [place].     Medications:  81mg  "aspirin  hydroxychloroquine for rheumatoid arthritis - on for >20 years   Oral Prednisone 5mg daily for RA     Dr. Brown - Retina Specialist   Dr. Gar - Comprehensive ophthalmologist     Medical history significant for:  Ocular Migraine  B-Cell lmyphoma 6 years in remission - treated here in [facility].  No HTN, no diabetes HLD.     The patient is presenting with an acute illness that potentially poses a threat to life or bodily function (vision).    Independent historians:  Daughter here today    Review of outside testing:  August 19th labs   CRP 5.5 mg/dL ESR 13    Labs 9/4/2021  Platelets 311 nl 150-400  CRP 0.8 mg/dL nl <0.9 mgdL  ESR 9 mm/hr     Temporal Artery Ultrasound 9/11/2021   No halo sign seen,   Impression \" no evidence of temporal arteritis seen in this study\"    My interpretation performed today of outside testing:  See above review of color fundus photos    Review of outside clinical notes:  Faxed Records From Dr. 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     Family history / social history:  No pertinent family history  Smoker in early 20s quit about 52 years ago     Exam:  Today with visual acuity 20/50 right eye 20/20 left eye with APD in the right eye, and decreased color in the right eye 7/11 ishihara plates compared to full color plates in the left eye.  Slit-lamp examination was unremarkable bilaterally aside from posterior chamber intraocular lenses in good location in both eyes. There was trace posterior capsular opacity in both eyes.  There is mild superior segmental pallor of the right optic nerve head and mild " diffuse edema (dramatically improved from photographs from September 30, 2021.  The left optic nerve had a cup-to-disc ratio of 0.15 and was normal-appearing.    Tests ordered and interpreted today:  VF:  right eye: good reliability inferior altitudinal defect   Left eye: good reliability, non-specific mild depression inferiorly     OCT RNFL:  Right eye: moderate thickening of the inferior RNFL   Left eye: normal thickness compared to age match controls         Complete documentation of historical and exam elements from today's encounter can be found in the full encounter summary report (not reduplicated in this progress note).  I personally obtained the chief complaint(s) and history of present illness.  I confirmed and edited as necessary the review of systems, past medical/surgical history, family history, social history, and examination findings as documented by others; and I examined the patient myself.  I personally reviewed the relevant tests, images, and reports as documented above.  I formulated and edited as necessary the assessment and plan and discussed the findings and management plan with the patient and family     MD Nancy Jackson MD  Ophthalmology Resident PGY3  [facility]

## 2021-09-27 ENCOUNTER — LAB (OUTPATIENT)
Dept: LAB | Facility: CLINIC | Age: 76
End: 2021-09-27
Payer: COMMERCIAL

## 2021-09-27 DIAGNOSIS — H35.89 OTHER SPECIFIED RETINAL DISORDERS: ICD-10-CM

## 2021-09-27 DIAGNOSIS — Z79.899 OTHER LONG TERM (CURRENT) DRUG THERAPY: ICD-10-CM

## 2021-09-27 DIAGNOSIS — H47.011 ISCHEMIC OPTIC NEUROPATHY OF RIGHT EYE: ICD-10-CM

## 2021-09-27 LAB
CHOLEST SERPL-MCNC: 149 MG/DL
FASTING STATUS PATIENT QL REPORTED: YES
HBA1C MFR BLD: 5.6 % (ref 0–5.6)
HDLC SERPL-MCNC: 75 MG/DL
LDLC SERPL CALC-MCNC: 53 MG/DL
NONHDLC SERPL-MCNC: 74 MG/DL
TRIGL SERPL-MCNC: 106 MG/DL

## 2021-09-27 PROCEDURE — 36415 COLL VENOUS BLD VENIPUNCTURE: CPT

## 2021-09-27 PROCEDURE — 83036 HEMOGLOBIN GLYCOSYLATED A1C: CPT

## 2021-09-27 PROCEDURE — 80061 LIPID PANEL: CPT

## 2021-09-29 ENCOUNTER — TELEPHONE (OUTPATIENT)
Dept: OPHTHALMOLOGY | Facility: CLINIC | Age: 76
End: 2021-09-29

## 2021-09-29 NOTE — TELEPHONE ENCOUNTER
Spoke with pt to relay the following lab results per Dr. Martin.    Maria Esther Salvador on 9/29/2021 at 1:22 PM        ----- Message from Francis Martin MD sent at 9/28/2021  5:14 PM CDT -----  Regarding: Results  Maria Esther,    Please call this patient and let her know that her cholesterol testing and her diabetes test all came back normal.  Besides her age we were unable to identify any other risk factor for her optic nerve stroke.  She should follow-up in 2 months with her ophthalmologist in [place] as we discussed during our visit.  My note is now completed and I am faxing a copy over to her [place] ophthalmologist.    Thank you. - Francis

## 2022-01-24 ENCOUNTER — TELEPHONE (OUTPATIENT)
Dept: ORTHOPEDICS | Facility: CLINIC | Age: 77
End: 2022-01-24
Payer: COMMERCIAL

## 2022-01-24 DIAGNOSIS — Z96.652 S/P TOTAL KNEE ARTHROPLASTY, LEFT: Primary | ICD-10-CM

## 2022-01-24 RX ORDER — AMOXICILLIN 500 MG/1
CAPSULE ORAL
Qty: 12 CAPSULE | Refills: 3 | Status: SHIPPED | OUTPATIENT
Start: 2022-01-24

## 2022-01-24 NOTE — TELEPHONE ENCOUNTER
[facility] Call Center    Phone Message    May a detailed message be left on voicemail: yes     Reason for Call: Other: Patient has a dental appt this afternoon and was requesting antibiotics. Please contact patient     Action Taken: Message routed to:  [facility]o    Travel Screening: Not Applicable

## 2022-01-24 NOTE — TELEPHONE ENCOUNTER
Returned call to Estefani, she had knee surgery with Dr. Dozier in 2011.  Estefani now lives in [place].  Facilitated refill to [facility] in [place].    Rose Barth, BSN, RN  RN Care Coordinator, Dr. Dozier  [facility] [place] [facility]

## 2022-02-19 ENCOUNTER — HEALTH MAINTENANCE LETTER (OUTPATIENT)
Age: 77
End: 2022-02-19

## 2022-07-08 ENCOUNTER — TELEPHONE (OUTPATIENT)
Dept: OPHTHALMOLOGY | Facility: CLINIC | Age: 77
End: 2022-07-08

## 2022-07-08 NOTE — TELEPHONE ENCOUNTER
[facility] Call Center    Phone Message    May a detailed message be left on voicemail: yes     Reason for Call: Other: Pt will be in [place] 9/28-10/3 and would like an appt with Dr Martin. Writer did not have this option. Please call to schedule if possible. Thank you.      Action Taken: Message routed to:  [facility] EYE    Travel Screening: Not Applicable

## 2022-09-28 ENCOUNTER — ONCOLOGY VISIT (OUTPATIENT)
Dept: ONCOLOGY | Facility: CLINIC | Age: 77
End: 2022-09-28
Payer: COMMERCIAL

## 2022-09-28 ENCOUNTER — OFFICE VISIT (OUTPATIENT)
Dept: OPHTHALMOLOGY | Facility: CLINIC | Age: 77
End: 2022-09-28
Attending: OPHTHALMOLOGY
Payer: COMMERCIAL

## 2022-09-28 VITALS
BODY MASS INDEX: 29.81 KG/M2 | SYSTOLIC BLOOD PRESSURE: 125 MMHG | HEIGHT: 62 IN | OXYGEN SATURATION: 99 % | RESPIRATION RATE: 16 BRPM | DIASTOLIC BLOOD PRESSURE: 75 MMHG | WEIGHT: 162 LBS | HEART RATE: 83 BPM | TEMPERATURE: 97.9 F

## 2022-09-28 DIAGNOSIS — D50.0 IRON DEFICIENCY ANEMIA DUE TO CHRONIC BLOOD LOSS: ICD-10-CM

## 2022-09-28 DIAGNOSIS — H53.10 SUBJECTIVE VISUAL DISTURBANCE: Primary | ICD-10-CM

## 2022-09-28 DIAGNOSIS — C83.38 DIFFUSE LARGE B-CELL LYMPHOMA OF LYMPH NODES OF MULTIPLE REGIONS (H): Primary | ICD-10-CM

## 2022-09-28 DIAGNOSIS — H53.40 VISUAL FIELD DEFECT: ICD-10-CM

## 2022-09-28 DIAGNOSIS — H47.20 PARTIAL OPTIC ATROPHY: ICD-10-CM

## 2022-09-28 PROCEDURE — 99214 OFFICE O/P EST MOD 30 MIN: CPT | Mod: GC | Performed by: OPHTHALMOLOGY

## 2022-09-28 PROCEDURE — 92133 CPTRZD OPH DX IMG PST SGM ON: CPT | Performed by: OPHTHALMOLOGY

## 2022-09-28 PROCEDURE — 99214 OFFICE O/P EST MOD 30 MIN: CPT | Performed by: INTERNAL MEDICINE

## 2022-09-28 PROCEDURE — 92083 EXTENDED VISUAL FIELD XM: CPT | Performed by: OPHTHALMOLOGY

## 2022-09-28 PROCEDURE — G0463 HOSPITAL OUTPT CLINIC VISIT: HCPCS | Mod: 25

## 2022-09-28 ASSESSMENT — EXTERNAL EXAM - RIGHT EYE: OD_EXAM: NORMAL

## 2022-09-28 ASSESSMENT — CUP TO DISC RATIO
OS_RATIO: 0.1
OD_RATIO: 0.2

## 2022-09-28 ASSESSMENT — REFRACTION_WEARINGRX
OD_SPHERE: PLANO
OS_CYLINDER: SPHERE
SPECS_TYPE: PAL
OS_SPHERE: +0.50
OD_ADD: +2.75
OD_AXIS: 014
OS_ADD: +2.50
OD_CYLINDER: +0.50

## 2022-09-28 ASSESSMENT — SLIT LAMP EXAM - LIDS
COMMENTS: NORMAL
COMMENTS: NORMAL

## 2022-09-28 ASSESSMENT — TONOMETRY
IOP_METHOD: ICARE
OS_IOP_MMHG: 11
OD_IOP_MMHG: 10

## 2022-09-28 ASSESSMENT — VISUAL ACUITY
METHOD: SNELLEN - LINEAR
OD_CC: 20/30
OS_CC: 20/20

## 2022-09-28 ASSESSMENT — PAIN SCALES - GENERAL: PAINLEVEL: NO PAIN (0)

## 2022-09-28 ASSESSMENT — EXTERNAL EXAM - LEFT EYE: OS_EXAM: NORMAL

## 2022-09-28 NOTE — LETTER
2022         RE: Randi Garcia  [address] [place] [place] [address]        Dear Colleague,    Thank you for referring your patient, Randi Garcia, to the [facility]. Please see a copy of my visit note below.    [facility] Hematology / Oncology  Progress Note  Name: Randi Garcia  :  1945    MRN:  [id number]    --------------------    Assessment / Plan:  Diffuse large B-cell lymphoma, stage IIAE, low-IPI:  # May 2015 Presented w/ chest wall mass; imaging w/ soft tissue lesion invading costosternal junction measuring 6.6 cm; bx w/ diffuse large B-cell lymphoma, germinal center type, VÍCTOR-KENISHA negative; staging marrow and LP w/o involvement; Staging PET w/ primary mass, additional chest wall lesions and right supraclavicular node   # 2015 - 2015 RCHOP x 3 cycles; CR.  # 2015 Consolidative radiation 3000 cGy.    Randi remains well clinically and no evidence of disease.  No long-term side effects from prior therapy.  Continue age-appropriate cancer surveillance.  Reviewed importance of flu and COVID vaccinations among others (pneumonia, shingles).  No imaging this far out from completion of therapy.  Outside labs show resolution of anemia; can discontinue iron supplement at this time  Left shift neutrophilia 2/2 prednisone use for RA.  Return to clinic 12 months with labs.    Rafy Borja MD    --------------------    Interval History:  Randi returns for follow up of diffuse large B-cell lymphoma.  All in all, she continues to do quite well.  No complaints or concerns today.  No long-term side effects from chemotherapy.  No long-term side effects from radiation.  Unexplained fevers, chills or sweats.  No adenopathy.  Stable weight, appetite and energy.  No recurrent or severe infections.    --------------------    Physical Exam:  VS: /75 (BP Location: Left arm)   Pulse 83   Temp 97.9  F (36.6  C) (Oral)   Resp  "16   Ht 1.562 m (5' 1.5\")   Wt 73.5 kg (162 lb)   SpO2 99%   BMI 30.11 kg/m    GEN: Well appearing.    Labs / Imaging:  We reviewed outside labs (CBC, CMP).      Again, thank you for allowing me to participate in the care of your patient.        Sincerely,        Rafy Borja MD    "

## 2022-09-28 NOTE — PROGRESS NOTES
"[facility] Hematology / Oncology  Progress Note  Name: Randi Garcia  :  1945    MRN:  [id number]    --------------------    Assessment / Plan:  Diffuse large B-cell lymphoma, stage IIAE, low-IPI:  # May 2015 Presented w/ chest wall mass; imaging w/ soft tissue lesion invading costosternal junction measuring 6.6 cm; bx w/ diffuse large B-cell lymphoma, germinal center type, VÍCTOR-KENISHA negative; staging marrow and LP w/o involvement; Staging PET w/ primary mass, additional chest wall lesions and right supraclavicular node   # 2015 - 2015 RCHOP x 3 cycles; CR.  # 2015 Consolidative radiation 3000 cGy.    Randi remains well clinically and no evidence of disease.  No long-term side effects from prior therapy.  Continue age-appropriate cancer surveillance.  Reviewed importance of flu and COVID vaccinations among others (pneumonia, shingles).  No imaging this far out from completion of therapy.  Outside labs show resolution of anemia; can discontinue iron supplement at this time  Left shift neutrophilia 2/2 prednisone use for RA.  Return to clinic 12 months with labs.    Rafy Borja MD    --------------------    Interval History:  Randi returns for follow up of diffuse large B-cell lymphoma.  All in all, she continues to do quite well.  No complaints or concerns today.  No long-term side effects from chemotherapy.  No long-term side effects from radiation.  Unexplained fevers, chills or sweats.  No adenopathy.  Stable weight, appetite and energy.  No recurrent or severe infections.    --------------------    Physical Exam:  VS: /75 (BP Location: Left arm)   Pulse 83   Temp 97.9  F (36.6  C) (Oral)   Resp 16   Ht 1.562 m (5' 1.5\")   Wt 73.5 kg (162 lb)   SpO2 99%   BMI 30.11 kg/m    GEN: Well appearing.    Labs / Imaging:  We reviewed outside labs (CBC, CMP).  "

## 2022-09-28 NOTE — NURSING NOTE
"Oncology Rooming Note    September 28, 2022 8:40 AM   Randi Garcia is a 76 year old female who presents for:    Chief Complaint   Patient presents with     Oncology Clinic Visit     Transfer of care     Initial Vitals: /75 (BP Location: Left arm)   Pulse 83   Temp 97.9  F (36.6  C) (Oral)   Resp 16   Ht 1.562 m (5' 1.5\")   Wt 73.5 kg (162 lb)   SpO2 99%   BMI 30.11 kg/m   Estimated body mass index is 30.11 kg/m  as calculated from the following:    Height as of this encounter: 1.562 m (5' 1.5\").    Weight as of this encounter: 73.5 kg (162 lb). Body surface area is 1.79 meters squared.  No Pain (0) Comment: Data Unavailable   No LMP recorded. Patient is postmenopausal.  Allergies reviewed: Yes  Medications reviewed: Yes    Medications: Medication refills not needed today.  Pharmacy name entered into EPIC:    [facility]  [employer] [facility] - [facility], [place] - [address] [place] [address] PHARMACY [facility] - [place], [place] - [address] [place] [address]    Clinical concerns: No new concerns         Maria Del Carmen Patricia LPN              "

## 2022-09-28 NOTE — NURSING NOTE
Chief Complaints and History of Present Illnesses   Patient presents with     Ischemic Optic Neuropathy Follow Up     Chief Complaint(s) and History of Present Illness(es)     Ischemic Optic Neuropathy Follow Up     Laterality: right eye    Associated symptoms: Negative for dryness, eye pain, redness, flashes, floaters and itching    Treatments tried: no treatments    Pain scale: 0/10              Comments     Pt here today for annual neuro ophthalmology assessment   Pt states RE vision seems about the same.  Pt denies any eye pain  - pt has pain in the back of the head when using eyes for too long.    Ocular meds = none    Eboni RIVAS, MANNIE 3:15 PM 09/28/2022

## 2022-09-28 NOTE — PROGRESS NOTES
1. Nonarteritic ischemic optic neuropathy right eye  - last seen 9/24/21 presentation with disc edema, peripapillary hemorrhage, altitudinal defect, and crowded nerves consistent with NAION. She had high-normal Hgb A1c  (5.6%) and normal lipid panel on labs 9/27/21. We advised follow-up in 2-3 months, which she pursued in [place] with Dr. Brown.    Today she has interval conversion of RNFL edema to diffuse atrophy sparing the inferior sector in the right eye consistent with her inferior altitudinal scotoma which is stable from last visit. Her RNFL thickness and automated perimetry are normal in the left eye.    We again discussed that studies have demonstrated anywhere from a 15-40% of NAION  In the fellow eye and that there are unfortunately no evidence based treatments at this time.     Patient's course of optic disc edema followed by resolution and segmental optic atrophy but stable inferior altitudinal visual field defect is the expected natural history of non-arteritic anterior ischemic optic neuropathy.    2. Plaquenil use, long-term    Patient unsure how long she has been on this but has been on since at least 2017.  Patient will plan to follow-up with a primary eye care provider elsewhere for once yearly retinal toxicity monitoring as long as she is on the medication.    I did not make a follow-up appointment, but I would be happy to see the patient back in the future should any new neuro-ophthalmic concern arise.        Historical data including initial visit HPI with me:     Randi Garcia is a pleasant 75 year old White female who presents to my neuro-ophthalmology clinic today on referral from Dr. Gar Retina Specialist for evaluation of ischemic optic neuropathy.      HPI:  Estefani lives in [place] and is back in town for doctors visits and to see family.. She used to live in [place]. She first noticed 8/28/2021 Saturday that her right eye started seeing funny. This change occurred during the day  while she was out driving.   She has a fuzziness/cloudiness in the lower half of her visual field in her right eye. This was clearly noted the following day after symptom onset when she covered her left eye to carefully evaluate what was going on with her right eye. She sought the care of ophthalmology specialists in [place].     Around the time of her symptoms - no headaches, pain in the muscle in the back side of her right neck. No jaw claudication. No scalp tenderness. No black outs or gray outs of vision and no diplopia.      She saw Dr. Gar first, general ophthalmologist and started patient on the medrol dose-brant.      Then she saw a retina specialist Dr. Brown, who ordered ESR, CRP and temporal artery ultrasound all of which were reportedly within normal limits.   Labs 9/4/2021  Platelets 311 nl 150-400  CRP 0.8 mg/dL nl <0.9 mgdL  ESR 9 mm/hr      I did see radiology interpretation of an ultrasound of the bilateral temporal arteries.  No evidence of temporal arteritis was found.  This was performed on September 11, 2021.     I reviewed very detailed excellent documentation from this 9/3/21 visit including colored fundus photographs that showed moderate optic disc swelling of the right optic nerve with a superior peripapillary hemorrhage okay.  There was documentation that at that time the patient denied any symptoms to suggest giant cell arteritis.  Visual acuity at that time was 20/40 in the right eye.       On September 17, 2021 the patient followed up with her retina specialist.  Acuity in the right eye again was 20/40 +2.  There was resolution of a superior.  Papillary splinter hemorrhage in the right eye along with some interval development of pallor of the optic nerve head.     Since symptom onset she has noticed very little change. She was placed on a medrol dose-pack. She feels that her visual field cut in the right eye may have been somewhat darker prior to starting steroids, but otherwise  "little change since first noticed. No symptoms in her left eye.  Today again the patient denies any symptoms suggestive of giant cell arteritis.     Referred her here for neuro-opthhalmology when she shared she would be coming to [place].      Medications:  81mg aspirin  hydroxychloroquine for rheumatoid arthritis - on for >20 years   Oral Prednisone 5mg daily for RA      Dr. Brown - Retina Specialist   Dr. Gar - Comprehensive ophthalmologist      Medical history significant for:  Ocular Migraine  B-Cell lmyphoma 6 years in remission - treated here in [facility].  No HTN, no diabetes HLD.         Review of outside testing:  August 19th labs   CRP 5.5 mg/dL ESR 13     Labs 9/4/2021  Platelets 311 nl 150-400  CRP 0.8 mg/dL nl <0.9 mgdL  ESR 9 mm/hr      Temporal Artery Ultrasound 9/11/2021   No halo sign seen,   Impression \" no evidence of temporal arteritis seen in this study\"     My interpretation performed today of outside testing:  See above review of color fundus photos     Review of outside clinical notes:  Faxed Records From Dr. 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      Family history / social history:  No pertinent family history  Smoker in early 20s quit about 52 years ago        Interval HPI since last visit Sept 2021:    Since last visit with me in Sept 2021 she was seen by Dr. Brown in [place] for follow-up on 3/29/22. She feels her vision fluctuates in both eyes, worse when fatigued. No flashes or floaters. No diplopia. No pain in the eyes. She notes an occasional sharp pain around her occiput, which seems to be elicited " with sustained visual activity. She may have had right-sided jaw pain about a month ago, which lasted a few days and was present all all times; it did not worsen when chewing. She has RA with overall improved arthralgias.     Of note, she is on chronic HCQ for RA at 400 mg/day. She has been on since at least 2017.  She currently weighs 159 lb, yielding dose of 5.55 mg/kg/day.    Exam:  Today with visual acuity 20/30 right eye 20/20 left eye with persistent APD in the right eye, and decreased color in the right eye 7/14 ishihara plates compared to full color plates in the left eye.  Slit-lamp examination was unremarkable bilaterally aside from posterior chamber intraocular lenses in good location in both eyes. There was trace posterior capsular opacity in the right eye.      The right optic nerve head showed moderate superior segmental pallor while the left optic nerve head appeared normal but crowded with an estimated cup-to-disc ratio of 0.1. remainder of dilated fundus exam is unremarkable bilaterally except for a 4 disc diameter nevus without concerning features in the right macula.    Tests ordered and interpreted today:  Octopus automated 30 degree visual field  right eye: good reliability inferior altitudinal defect, stable  Left eye: good reliability, non-specific mild depression inferiorly      OCT RNFL:  Right eye: transition of edema to diffuse thinning, inferior sector spared  Left eye: normal thickness compared to age match controls     Complete documentation of historical and exam elements from today's encounter can be found in the full encounter summary report (not reduplicated in this progress note).  I personally obtained the chief complaint(s) and history of present illness.  I confirmed and edited as necessary the review of systems, past medical/surgical history, family history, social history, and examination findings as documented by others; and I examined the patient myself.  I personally  reviewed the relevant tests, images, and reports as documented above.  I formulated and edited as necessary the assessment and plan and discussed the findings and management plan with the patient and family.  I personally reviewed the ophthalmic test(s) associated with this encounter, agree with the interpretation(s) as documented by the resident/fellow, and have edited the corresponding report(s) as necessary.     MD Isac Jackson MD PhD Neuro-ophthalmology Fellow

## 2022-10-22 ENCOUNTER — HEALTH MAINTENANCE LETTER (OUTPATIENT)
Age: 77
End: 2022-10-22

## 2023-01-08 ENCOUNTER — HOSPITAL ENCOUNTER (EMERGENCY)
Age: 78
Discharge: HOME | End: 2023-01-08
Attending: EMERGENCY MEDICINE
Payer: MEDICARE

## 2023-01-08 ENCOUNTER — APPOINTMENT (OUTPATIENT)
Dept: CT IMAGING | Age: 78
End: 2023-01-08
Attending: EMERGENCY MEDICINE
Payer: MEDICARE

## 2023-01-08 VITALS
BODY MASS INDEX: 28.85 KG/M2 | WEIGHT: 156.8 LBS | OXYGEN SATURATION: 98 % | RESPIRATION RATE: 18 BRPM | DIASTOLIC BLOOD PRESSURE: 67 MMHG | HEIGHT: 62 IN | TEMPERATURE: 98 F | SYSTOLIC BLOOD PRESSURE: 147 MMHG | HEART RATE: 83 BPM

## 2023-01-08 DIAGNOSIS — R07.9 CHEST PAIN, UNSPECIFIED TYPE: Primary | ICD-10-CM

## 2023-01-08 LAB
ALBUMIN SERPL-MCNC: 3.4 G/DL (ref 3.4–5)
ALBUMIN/GLOB SERPL: 1.1 (ref 0.8–1.7)
ALP SERPL-CCNC: 67 U/L (ref 45–117)
ALT SERPL-CCNC: 21 U/L (ref 13–56)
ANION GAP SERPL CALC-SCNC: 8 MMOL/L (ref 3–18)
AST SERPL-CCNC: 18 U/L (ref 10–38)
ATRIAL RATE: 87 BPM
BASOPHILS # BLD: 0.1 K/UL (ref 0–0.1)
BASOPHILS NFR BLD: 0 % (ref 0–2)
BILIRUB SERPL-MCNC: 0.3 MG/DL (ref 0.2–1)
BUN SERPL-MCNC: 21 MG/DL (ref 7–18)
BUN/CREAT SERPL: 25 (ref 12–20)
CALCIUM SERPL-MCNC: 9.4 MG/DL (ref 8.5–10.1)
CALCULATED P AXIS, ECG09: 42 DEGREES
CALCULATED R AXIS, ECG10: -14 DEGREES
CALCULATED T AXIS, ECG11: 41 DEGREES
CHLORIDE SERPL-SCNC: 105 MMOL/L (ref 100–111)
CO2 SERPL-SCNC: 26 MMOL/L (ref 21–32)
CREAT SERPL-MCNC: 0.85 MG/DL (ref 0.6–1.3)
D DIMER PPP FEU-MCNC: 3.56 UG/ML(FEU)
DIAGNOSIS, 93000: NORMAL
DIFFERENTIAL METHOD BLD: ABNORMAL
EOSINOPHIL # BLD: 0.1 K/UL (ref 0–0.4)
EOSINOPHIL NFR BLD: 1 % (ref 0–5)
ERYTHROCYTE [DISTWIDTH] IN BLOOD BY AUTOMATED COUNT: 16.8 % (ref 11.6–14.5)
GLOBULIN SER CALC-MCNC: 3 G/DL (ref 2–4)
GLUCOSE SERPL-MCNC: 99 MG/DL (ref 74–99)
HCT VFR BLD AUTO: 36.6 % (ref 35–45)
HGB BLD-MCNC: 11.3 G/DL (ref 12–16)
IMM GRANULOCYTES # BLD AUTO: 0.1 K/UL (ref 0–0.04)
IMM GRANULOCYTES NFR BLD AUTO: 1 % (ref 0–0.5)
LYMPHOCYTES # BLD: 0.8 K/UL (ref 0.9–3.6)
LYMPHOCYTES NFR BLD: 6 % (ref 21–52)
MCH RBC QN AUTO: 25.1 PG (ref 24–34)
MCHC RBC AUTO-ENTMCNC: 30.9 G/DL (ref 31–37)
MCV RBC AUTO: 81.2 FL (ref 78–100)
MONOCYTES # BLD: 0.6 K/UL (ref 0.05–1.2)
MONOCYTES NFR BLD: 5 % (ref 3–10)
NEUTS SEG # BLD: 11.8 K/UL (ref 1.8–8)
NEUTS SEG NFR BLD: 88 % (ref 40–73)
NRBC # BLD: 0 K/UL (ref 0–0.01)
NRBC BLD-RTO: 0 PER 100 WBC
P-R INTERVAL, ECG05: 136 MS
PLATELET # BLD AUTO: 329 K/UL (ref 135–420)
PMV BLD AUTO: 9.6 FL (ref 9.2–11.8)
POTASSIUM SERPL-SCNC: 4.3 MMOL/L (ref 3.5–5.5)
PROT SERPL-MCNC: 6.4 G/DL (ref 6.4–8.2)
Q-T INTERVAL, ECG07: 370 MS
QRS DURATION, ECG06: 80 MS
QTC CALCULATION (BEZET), ECG08: 445 MS
RBC # BLD AUTO: 4.51 M/UL (ref 4.2–5.3)
SODIUM SERPL-SCNC: 139 MMOL/L (ref 136–145)
TROPONIN-HIGH SENSITIVITY: 10 NG/L (ref 0–54)
TROPONIN-HIGH SENSITIVITY: 10 NG/L (ref 0–54)
VENTRICULAR RATE, ECG03: 87 BPM
WBC # BLD AUTO: 13.4 K/UL (ref 4.6–13.2)

## 2023-01-08 PROCEDURE — 85379 FIBRIN DEGRADATION QUANT: CPT

## 2023-01-08 PROCEDURE — 93005 ELECTROCARDIOGRAM TRACING: CPT

## 2023-01-08 PROCEDURE — 99285 EMERGENCY DEPT VISIT HI MDM: CPT

## 2023-01-08 PROCEDURE — 71275 CT ANGIOGRAPHY CHEST: CPT

## 2023-01-08 PROCEDURE — 85025 COMPLETE CBC W/AUTO DIFF WBC: CPT

## 2023-01-08 PROCEDURE — 84484 ASSAY OF TROPONIN QUANT: CPT

## 2023-01-08 PROCEDURE — 80053 COMPREHEN METABOLIC PANEL: CPT

## 2023-01-08 PROCEDURE — 74011000636 HC RX REV CODE- 636

## 2023-01-08 RX ORDER — HYDROXYCHLOROQUINE SULFATE 200 MG/1
200 TABLET, FILM COATED ORAL DAILY
COMMUNITY

## 2023-01-08 RX ORDER — PREDNISONE 10 MG/1
5 TABLET ORAL DAILY
COMMUNITY

## 2023-01-08 RX ADMIN — IOPAMIDOL 72 ML: 755 INJECTION, SOLUTION INTRAVENOUS at 13:30

## 2023-01-08 NOTE — ED NOTES
Discussed with Dr. Satish Ware,   CTA ordered via verbal order   Patient updated, no change in status  Weight obtained per standing scale.

## 2023-01-08 NOTE — ED PROVIDER NOTES
HER05[birth year]    76 y.o. WHITE/NON- female    Presents to the ED with   Chief Complaint   Patient presents with    Chest Pain         HPI: 1:36 PM  The patient presented to the emergency department complaining of Chest pain. The patient had intermittent chest discomfort for the past 18-24 hours. She has no known history of coronary artery disease, or thromboembolic disease. She does not smoke, she does not have diabetes, she does not have hypertension, she does not have dyslipidemia. No prior history of stress testing, or heart catheterization. She denies any risk factors for thromboembolic disease. [facility] and referred to the emergency department. ROS:  14 organ system review of systems is complete and is negative except as addressed in the HPI        Social History:   Social History     Socioeconomic History    Marital status:      Spouse name: Not on file    Number of children: Not on file    Years of education: Not on file    Highest education level: Not on file   Occupational History    Not on file   Tobacco Use    Smoking status: Former     Types: Cigarettes    Smokeless tobacco: Never   Vaping Use    Vaping Use: Never used   Substance and Sexual Activity    Alcohol use: Yes     Comment: rarely    Drug use: Never    Sexual activity: Not on file   Other Topics Concern    Not on file   Social History Narrative    Not on file     Social Determinants of Health     Financial Resource Strain: Not on file   Food Insecurity: Not on file   Transportation Needs: Not on file   Physical Activity: Not on file   Stress: Not on file   Social Connections: Not on file   Intimate Partner Violence: Not on file   Housing Stability: Not on file      reports that she has quit smoking. Her smoking use included cigarettes. She has never used smokeless tobacco.    Family History: History reviewed. No pertinent family history.     Past Medical History:   Past Medical History: Diagnosis Date    Rheumatoid arthritis ([facility] [address]          Past Surgical History: History reviewed. No pertinent surgical history. Primary Care: None    Immunizations:     Medications: No current facility-administered medications for this encounter. Current Outpatient Medications:     hydrOXYchloroQUINE (PlaqueniL) 200 mg tablet, Take 200 mg by mouth daily. , Disp: , Rfl:     predniSONE (DELTASONE) 10 mg tablet, Take 5 mg by mouth daily. , Disp: , Rfl:     Allergies: Allergies   Allergen Reactions    Motrin [Ibuprofen] Itching         Last Cath      Last Stress Test      Prior:ECHO              Physical Exam:  . Patient Vitals for the past 12 hrs:   Temp Pulse Resp BP SpO2   01/08/23 1533 98 °F (36.7 °C) -- -- -- --   01/08/23 1530 -- -- -- (!) 142/61 100 %   01/08/23 1513 -- -- -- (!) 112/90 99 %   01/08/23 1201 97.3 °F (36.3 °C) 83 18 (!) 156/81 98 %     Gen: Well developed, well nourished 76 y.o. female  HEENT: Normocephalic, atraumatic. No scleral icterus. Extraocular movements intact. .  Normal mucous membranes. Uvula midline. Airway widely patent. Respiratory: No accessory muscle use. No wheeze, No rales, No rhonchi. Normal chest wall excursion. No subcutaneous air, no rib crepitus  Cardiovascular: Regular rhythm and rate, Normal pulses, Normal perfusion. No edema. Gastrointestinal: Non distended, Non tender, No masses. No ascites. No organomegaly. No evidence of trauma  Musculoskeletal: Full range of motion at all other tested joints. No joint effusions. Neurological: Normal strength, Normal sensation. Normal speech. No ataxia. Cranial nerves II-XII normal as tested. Skin: No rash, petechia or purpura. Warm and dry  Psychiatric: No suicidal ideation, No homicidal ideation. No hallucinations. Organized thoughts. Normal mood. normal affect. Heme: No lymphadenopathy.    Genitourinary: Deferred    Orders:   Orders Placed This Encounter    CTA CHEST W OR W WO CONT    CBC WITH AUTOMATED DIFF    METABOLIC PANEL, COMPREHENSIVE    TROPONIN-HIGH SENSITIVITY    D DIMER    TROPONIN-HIGH SENSITIVITY    EKG, 12 LEAD, INITIAL    hydrOXYchloroQUINE (PlaqueniL) 200 mg tablet    predniSONE (DELTASONE) 10 mg tablet    iopamidoL (ISOVUE-370) 370 mg iodine /mL (76 %) injection 80 mL       ECG:   Current:Completed at 12 7 PM.  Normal sinus rhythm, rate 87, QRS duration 80 ms, QTc 445 ms, no STEMI. Comparison: .    Imaging:   CTA CHEST W OR W WO CONT    Result Date: 1/8/2023  1. No pulmonary embolus . 2. Nonspecific mediastinal and bilateral axillary adenopathy. Reactive versus inflammatory versus neoplastic. Correlation with patient's history and clinical presentation. Labs:  Labs Reviewed   CBC WITH AUTOMATED DIFF - Abnormal; Notable for the following components:       Result Value    WBC 13.4 (*)     HGB 11.3 (*)     MCHC 30.9 (*)     RDW 16.8 (*)     NEUTROPHILS 88 (*)     LYMPHOCYTES 6 (*)     IMMATURE GRANULOCYTES 1 (*)     ABS. NEUTROPHILS 11.8 (*)     ABS. LYMPHOCYTES 0.8 (*)     ABS. IMM. GRANS. 0.1 (*)     All other components within normal limits   METABOLIC PANEL, COMPREHENSIVE - Abnormal; Notable for the following components:    BUN 21 (*)     BUN/Creatinine ratio 25 (*)     All other components within normal limits   D DIMER - Abnormal; Notable for the following components:    D DIMER 3.56 (*)     All other components within normal limits   TROPONIN-HIGH SENSITIVITY   TROPONIN-HIGH SENSITIVITY       EMERGENCY DEPARTMENT COURSE  4:20 PM:The patient was observed in the emergency department. Relevant imaging studies, vital signs, and observation notes were reviewed. The patient's initial and repeat laboratory tests, including an initial and follow-up troponin were reviewed. There is no clinically significant change during the period of observation in the emergency department.   I had a lengthy discussion with the patient regarding the risks and benefits of of admission to the hospital, versus further treatment observation the emergency department, or discharge home. Ultimately the patient decided that they wanted to go home, and felt safe for discharge. I counseled the patient that if they change her mind about wanting any further testing or treatment in the emergency department they can return at any time. They are low risk for outpatient management            Diagnosis:   1. Chest pain, unspecified type          Disposition:  Discharged      Discharge Medications:   Current Discharge Medication List            Referral:     Follow-up Information    None            The patient has presented with significant concern that an emergency condition exists. The patient is a prudent layperson, and as such is protected by the prudent layperson standard. (This chart was created with dictation software and an EHR.   It may contain unintended unedited historical and or dictation errors)

## 2023-01-08 NOTE — ED TRIAGE NOTES
Urgent Care today for anterior and posterior chest pain which began a few days ago. Was ambulated at Urgent Care with Sa02 drop and so was sent here for r/o P.E. Chest pain described as intermittent, onset again with exertion. Minimal cough. No fevers.

## 2023-01-16 PROBLEM — R07.1 CHEST PAIN ON BREATHING: Status: ACTIVE | Noted: 2023-01-08

## 2023-01-16 NOTE — PROGRESS NOTES
Felicitas Nunez is a 76 y.o. female is seen on 1/17/2023 for [address] [facility] Follow Up (Seen at the [facility] on 1/8/23 for chest pain)    Assessment & Plan:     1. Mediastinal adenopathy  Assessment & Plan:  Referral to heme/onc for management  Orders:  -     REFERRAL TO HEMATOLOGY  2. Axillary adenopathy  Assessment & Plan:  Referral to heme/onc for management  Orders:  -     REFERRAL TO HEMATOLOGY  3. History of lymphoma  Assessment & Plan:  Management per heme/onc  4. Chest pain on breathing  Assessment & Plan:  Resolved  5. Rheumatoid arthritis, involving unspecified site, unspecified whether rheumatoid factor present [facility])  Assessment & Plan:  Continue management for rheumatology  6. Fatigue, unspecified type  Assessment & Plan:  Check cbc and vitamin D  Orders:  -     CBC WITH AUTOMATED DIFF; Future  -     VITAMIN D, 25 HYDROXY; Future  7. Postmenopausal  8. Screening for lipid disorders  -     LIPID PANEL; Future  9. Need for hepatitis C screening test  -     HEPATITIS C AB; Future  10. Encounter to establish care    Follow-up and Dispositions    Return in about 4 weeks (around 2/14/2023) for [facility], lab results. Subjective:     HPI    Previous PCP: unsure which name  Reason for switching: moved from [address] [place] [address] History/Health Concerns:    Mediastinal adenopathy  CTA on 1/8/23: MEDIASTINUM: Nonspecific mediastinal nodes. Largest middle compartment superior mediastinum lymph node on series 4 image 77 measuring approximately 1.6 x 1.2 cm. History of lymphoma    Bilateral axillary adenopathy  CTA on 1/8/23: Nonspecific axillary adenopathy with largest lymph node in the left axilla measuring approximately 1.5 x 1.1 cm.   Has history of lymphoma    History of lymphoma  Has history of lymphoma  6-7 years ago  Sees a hematologist/oncologist;   Was seen in [place], woke up with a third breast  Was on chemotherapy  Not currently undergoing any treatment    Chest pain  Has Rheumatoid arthritis  States muscles were sore  Sees rheumatology  Treatment: prednisone 5 mg daily, and plaquenil 200 mg daily  Chest pain has resolved    Rheumatoid Arthritis  Sees rheumatology in [facility] to take [address] for years    Fatigue  No history of vitamin D deficiency  No history of hypothyroidism    Preventive:  Hepatitis C screening- ordered  COVID vaccine- received  Shingles vaccine- recommended  DEXA ordered- last done in august 2021  Pneumonia vaccine- received, 2017  Flu vaccine- 09/16/22  Medicare yearly exam- declined    Review of Systems   Constitutional:  Negative for chills, fever and malaise/fatigue. Respiratory:  Negative for shortness of breath. Cardiovascular:  Negative for chest pain. Objective:   /71 (BP 1 Location: Right upper arm, BP Patient Position: Sitting, BP Cuff Size: Large adult)   Pulse 90   Temp 98.4 °F (36.9 °C) (Oral)   Resp 16   Ht 5' 2\" (1.575 m)   Wt 158 lb 6.4 oz (71.8 kg)   SpO2 97%   BMI 28.97 kg/m²     Physical Exam  Vitals and nursing note reviewed. Constitutional:       General: She is not in acute distress. Appearance: She is not ill-appearing. HENT:      Head: Normocephalic and atraumatic. Cardiovascular:      Rate and Rhythm: Normal rate and regular rhythm. Pulmonary:      Effort: Pulmonary effort is normal. No respiratory distress. Breath sounds: No wheezing, rhonchi or rales. Abdominal:      General: There is no distension. Palpations: Abdomen is soft. Tenderness: There is no guarding or rebound. Skin:     General: Skin is warm and dry. Neurological:      Mental Status: She is alert.       ALEXIS LayneC

## 2023-01-17 ENCOUNTER — OFFICE VISIT (OUTPATIENT)
Dept: FAMILY MEDICINE CLINIC | Age: 78
End: 2023-01-17
Payer: MEDICARE

## 2023-01-17 VITALS
OXYGEN SATURATION: 97 % | WEIGHT: 158.4 LBS | DIASTOLIC BLOOD PRESSURE: 71 MMHG | BODY MASS INDEX: 29.15 KG/M2 | HEIGHT: 62 IN | HEART RATE: 90 BPM | RESPIRATION RATE: 16 BRPM | SYSTOLIC BLOOD PRESSURE: 128 MMHG | TEMPERATURE: 98.4 F

## 2023-01-17 DIAGNOSIS — R59.0 MEDIASTINAL ADENOPATHY: Primary | ICD-10-CM

## 2023-01-17 DIAGNOSIS — R07.1 CHEST PAIN ON BREATHING: ICD-10-CM

## 2023-01-17 DIAGNOSIS — R53.83 FATIGUE, UNSPECIFIED TYPE: ICD-10-CM

## 2023-01-17 DIAGNOSIS — Z11.59 NEED FOR HEPATITIS C SCREENING TEST: ICD-10-CM

## 2023-01-17 DIAGNOSIS — M06.9 RHEUMATOID ARTHRITIS, INVOLVING UNSPECIFIED SITE, UNSPECIFIED WHETHER RHEUMATOID FACTOR PRESENT (HCC): ICD-10-CM

## 2023-01-17 DIAGNOSIS — Z13.220 SCREENING FOR LIPID DISORDERS: ICD-10-CM

## 2023-01-17 DIAGNOSIS — Z76.89 ENCOUNTER TO ESTABLISH CARE: ICD-10-CM

## 2023-01-17 DIAGNOSIS — Z78.0 POSTMENOPAUSAL: ICD-10-CM

## 2023-01-17 DIAGNOSIS — R59.0 AXILLARY ADENOPATHY: ICD-10-CM

## 2023-01-17 DIAGNOSIS — Z85.79 HISTORY OF LYMPHOMA: ICD-10-CM

## 2023-01-17 PROBLEM — Z85.72 HISTORY OF LYMPHOMA: Status: ACTIVE | Noted: 2023-01-17

## 2023-01-17 PROCEDURE — G8417 CALC BMI ABV UP PARAM F/U: HCPCS

## 2023-01-17 PROCEDURE — 99204 OFFICE O/P NEW MOD 45 MIN: CPT

## 2023-01-17 PROCEDURE — G8427 DOCREV CUR MEDS BY ELIG CLIN: HCPCS

## 2023-01-17 PROCEDURE — 1090F PRES/ABSN URINE INCON ASSESS: CPT

## 2023-01-17 PROCEDURE — 1101F PT FALLS ASSESS-DOCD LE1/YR: CPT

## 2023-01-17 PROCEDURE — G8432 DEP SCR NOT DOC, RNG: HCPCS

## 2023-01-17 PROCEDURE — G8536 NO DOC ELDER MAL SCRN: HCPCS

## 2023-01-17 PROCEDURE — 1123F ACP DISCUSS/DSCN MKR DOCD: CPT

## 2023-01-17 PROCEDURE — G8400 PT W/DXA NO RESULTS DOC: HCPCS

## 2023-01-17 NOTE — PROGRESS NOTES
Humza Saini presents today for   Chief Complaint   Patient presents with    [facility] Follow Up     Seen at the [facility] on 1/8/23 for chest pain       Is someone accompanying this pt? no    Is the patient using any DME equipment during [address]? no    Depression Screening:  3 most recent PHQ Screens 1/17/2023   Little interest or pleasure in doing things Not at all   Feeling down, depressed, irritable, or hopeless Not at all   Total Score PHQ 2 0       Learning Assessment:  Learning Assessment 1/17/2023   PRIMARY LEARNER Patient   PRIMARY LANGUAGE ENGLISH   LEARNER PREFERENCE PRIMARY DEMONSTRATION   ANSWERED BY patient   RELATIONSHIP SELF       Abuse Screening:  Abuse Screening Questionnaire 1/17/2023   Do you ever feel afraid of your partner? N   Are you in a relationship with someone who physically or mentally threatens you? N   Is it safe for you to go home? Y       Fall Screening  Fall Risk Assessment, last 12 mths 1/17/2023   Able to walk? Yes   Fall in past 12 months? 0   Do you feel unsteady? 0   Are you worried about falling 0       Generalized Anxiety  No flowsheet data found. Health Maintenance Due   Topic Date Due    Hepatitis C Screening  Never done    Depression Screen  Never done    COVID-19 Vaccine (1) Never done    DTaP/Tdap/Td series (1 - Tdap) Never done    Shingles Vaccine (1 of 2) Never done    Bone Densitometry (Dexa) Screening  Never done    Pneumococcal 65+ years (1 - PCV) Never done    Flu Vaccine (1) Never done    Medicare Yearly Exam  01/05/2023   . Health Maintenance reviewed and discussed and ordered per Provider. Advance Directive:  1. Do you have an advance directive in place?  Patient Reply:no

## 2023-01-19 ENCOUNTER — PATIENT MESSAGE (OUTPATIENT)
Dept: FAMILY MEDICINE CLINIC | Age: 78
End: 2023-01-19

## 2023-01-19 DIAGNOSIS — R59.0 AXILLARY ADENOPATHY: ICD-10-CM

## 2023-01-19 DIAGNOSIS — R59.0 MEDIASTINAL ADENOPATHY: Primary | ICD-10-CM

## 2023-01-23 NOTE — TELEPHONE ENCOUNTER
Please let pt know I referred her to Dr. Summer Su with [facility] at [facility]. Thank you!

## 2023-01-24 ENCOUNTER — TELEPHONE (OUTPATIENT)
Dept: FAMILY MEDICINE CLINIC | Age: 78
End: 2023-01-24

## 2023-01-24 NOTE — TELEPHONE ENCOUNTER
Regarding: Oncology appointment   ----- Message from LINNEA Moody sent at 1/23/2023  9:27 AM EST -----       ----- Message from Lottie Smiley to LINNEA Waller sent at 1/19/2023 10:17 AM -----   Dr Chilango Greco is very busy, can I get a referral to [facility] [place]?

## 2023-01-30 ENCOUNTER — OFFICE VISIT (OUTPATIENT)
Dept: ONCOLOGY | Age: 78
End: 2023-01-30
Payer: MEDICARE

## 2023-01-30 ENCOUNTER — TELEPHONE (OUTPATIENT)
Dept: ONCOLOGY | Facility: CLINIC | Age: 78
End: 2023-01-30
Payer: COMMERCIAL

## 2023-01-30 ENCOUNTER — HOSPITAL ENCOUNTER (OUTPATIENT)
Dept: LAB | Age: 78
Discharge: HOME | End: 2023-01-30
Payer: MEDICARE

## 2023-01-30 VITALS
WEIGHT: 159 LBS | DIASTOLIC BLOOD PRESSURE: 66 MMHG | HEIGHT: 62 IN | OXYGEN SATURATION: 96 % | BODY MASS INDEX: 29.26 KG/M2 | HEART RATE: 100 BPM | RESPIRATION RATE: 16 BRPM | SYSTOLIC BLOOD PRESSURE: 129 MMHG

## 2023-01-30 DIAGNOSIS — Z85.79 HISTORY OF LYMPHOMA: ICD-10-CM

## 2023-01-30 DIAGNOSIS — R59.0 AXILLARY ADENOPATHY: ICD-10-CM

## 2023-01-30 DIAGNOSIS — R59.0 AXILLARY ADENOPATHY: Primary | ICD-10-CM

## 2023-01-30 DIAGNOSIS — R59.0 MEDIASTINAL ADENOPATHY: ICD-10-CM

## 2023-01-30 LAB
ALBUMIN SERPL-MCNC: 3.5 G/DL (ref 3.4–5)
ALBUMIN/GLOB SERPL: 1.2 (ref 0.8–1.7)
ALP SERPL-CCNC: 68 U/L (ref 45–117)
ALT SERPL-CCNC: 22 U/L (ref 13–56)
ANION GAP SERPL CALC-SCNC: 5 MMOL/L (ref 3–18)
AST SERPL-CCNC: 17 U/L (ref 10–38)
BASOPHILS # BLD: 0.1 K/UL (ref 0–0.1)
BASOPHILS NFR BLD: 0 % (ref 0–2)
BILIRUB SERPL-MCNC: 0.3 MG/DL (ref 0.2–1)
BUN SERPL-MCNC: 20 MG/DL (ref 7–18)
BUN/CREAT SERPL: 29 (ref 12–20)
CALCIUM SERPL-MCNC: 9.7 MG/DL (ref 8.5–10.1)
CHLORIDE SERPL-SCNC: 105 MMOL/L (ref 100–111)
CO2 SERPL-SCNC: 30 MMOL/L (ref 21–32)
CREAT SERPL-MCNC: 0.7 MG/DL (ref 0.6–1.3)
DIFFERENTIAL METHOD BLD: ABNORMAL
EOSINOPHIL # BLD: 0.1 K/UL (ref 0–0.4)
EOSINOPHIL NFR BLD: 1 % (ref 0–5)
ERYTHROCYTE [DISTWIDTH] IN BLOOD BY AUTOMATED COUNT: 17.2 % (ref 11.6–14.5)
GLOBULIN SER CALC-MCNC: 2.9 G/DL (ref 2–4)
GLUCOSE SERPL-MCNC: 95 MG/DL (ref 74–99)
HCT VFR BLD AUTO: 37.2 % (ref 35–45)
HGB BLD-MCNC: 11.2 G/DL (ref 12–16)
IMM GRANULOCYTES # BLD AUTO: 0.1 K/UL (ref 0–0.04)
IMM GRANULOCYTES NFR BLD AUTO: 1 % (ref 0–0.5)
LDH SERPL L TO P-CCNC: 203 U/L (ref 81–234)
LYMPHOCYTES # BLD: 0.7 K/UL (ref 0.9–3.6)
LYMPHOCYTES NFR BLD: 6 % (ref 21–52)
MCH RBC QN AUTO: 25.4 PG (ref 24–34)
MCHC RBC AUTO-ENTMCNC: 30.1 G/DL (ref 31–37)
MCV RBC AUTO: 84.4 FL (ref 78–100)
MONOCYTES # BLD: 0.8 K/UL (ref 0.05–1.2)
MONOCYTES NFR BLD: 6 % (ref 3–10)
NEUTS SEG # BLD: 10.9 K/UL (ref 1.8–8)
NEUTS SEG NFR BLD: 86 % (ref 40–73)
NRBC # BLD: 0 K/UL (ref 0–0.01)
NRBC BLD-RTO: 0 PER 100 WBC
PLATELET # BLD AUTO: 330 K/UL (ref 135–420)
PMV BLD AUTO: 9.7 FL (ref 9.2–11.8)
POTASSIUM SERPL-SCNC: 4.4 MMOL/L (ref 3.5–5.5)
PROT SERPL-MCNC: 6.4 G/DL (ref 6.4–8.2)
RBC # BLD AUTO: 4.41 M/UL (ref 4.2–5.3)
SODIUM SERPL-SCNC: 140 MMOL/L (ref 136–145)
WBC # BLD AUTO: 12.6 K/UL (ref 4.6–13.2)

## 2023-01-30 PROCEDURE — 83615 LACTATE (LD) (LDH) ENZYME: CPT

## 2023-01-30 PROCEDURE — 36415 COLL VENOUS BLD VENIPUNCTURE: CPT

## 2023-01-30 PROCEDURE — 80053 COMPREHEN METABOLIC PANEL: CPT

## 2023-01-30 PROCEDURE — 85025 COMPLETE CBC W/AUTO DIFF WBC: CPT

## 2023-01-30 NOTE — PROGRESS NOTES
Hematology/Oncology Consultation Note      Date: 2023    Name: Dain Roblero  : 1945        LINNEA Sutherland         Subjective:     Chief complaint: LYMPHADENOPATHY    History of Present Illness:   Ms. Sugar Pitt is a most pleasant 76y.o. year old female who was seen for consultation of ***. The patient has a past medical history significant of       COVID THANKSGIVING, WEIGHT LOSS BUT IMPROVING  NO b SYMTOMS    BREATHIBG BETTER      RA on Steroids 5mg daily since , , cannot tolearte other treatment, Fu RHEUMA IN [facility] Hematologist  B  cell lymphoma, 2015  RCHOP  AND XRT   CT showed normal, last scan   saw Hematologist        No pulmonary embolus . 2. Nonspecific mediastinal and bilateral axillary adenopathy. Reactive versus  inflammatory versus neoplastic. Correlation with patient's history and clinical  presentation. Has history of lymphoma  6-7 years ago  Sees a hematologist/oncologist;   Was seen in [place], woke up with a third breast  Was on chemotherapy  Not currently undergoing any treatment          Upon further history taking and review of EMR, patient presented with  Labs review indicated   The patient reported         The patient otherwise has no other complaints. Denied fever, chills, night sweat, unintentional weight loss, skin lumps or bumps, acute bleeding or bruising issues. No acute bleeding, blood in stool, dark stool, melena, hematochezia, hemoptysis, dark urine, or easily bruising. Denied headache, acute vision change, dizziness, chest pain, worsen shortness of breath, palpitation, productive cough, nausea, vomiting, abdominal pain, altered bowel habits, dysuria, worsen bone pain or back pain, new focal numbness or weakness.              Past Medical History, Family History, and Social History:    Past Medical History:   Diagnosis Date    Rheumatoid arthritis ([address]      Past Surgical History:   Procedure Laterality Date    HX KNEE REPLACEMENT       Social History     Socioeconomic History    Marital status:      Spouse name: Not on file    Number of children: Not on file    Years of education: Not on file    Highest education level: Not on file   Occupational History    Not on file   Tobacco Use    Smoking status: Former     Types: Cigarettes    Smokeless tobacco: Never   Vaping Use    Vaping Use: Never used   Substance and Sexual Activity    Alcohol use: Yes     Comment: rarely    Drug use: Never    Sexual activity: Not Currently   Other Topics Concern    Not on file   Social History Narrative    Not on file     Social Determinants of Health     Financial Resource Strain: Not on file   Food Insecurity: Not on file   Transportation Needs: Not on file   Physical Activity: Not on file   Stress: Not on file   Social Connections: Not on file   Intimate Partner Violence: Not on file   Housing Stability: Not on file     Family History   Problem Relation Age of Onset    COPD Mother     Cancer Sister         brain    Mental illness Sister     Heart Attack Brother     Mental illness Brother      Current Outpatient Medications   Medication Sig Dispense Refill    hydrOXYchloroQUINE (PLAQUENIL) 200 mg tablet Take 200 mg by mouth daily.  predniSONE (DELTASONE) 10 mg tablet Take 5 mg by mouth daily. ROS         Objective: There were no vitals taken for this visit. ECOG Performance Status (grade): ***  0 - able to carry on all pre-disease activity w/out restriction  1 - restricted but able to carry out light work  2 - ambulatory and can self- care but unable to carry out work  3 - bed or chair >50% of waking hours  4 - completely disable, total care, confined to bed or chair    Physical Exam     Diagnostics:      No results found for this or any previous visit (from the past 96 hour(s)). Imaging:  No results found for this or any previous visit.       No results found for this or any previous visit. Results for orders placed during the hospital encounter of 01/08/23    CTA CHEST W OR W WO CONT    Narrative  HISTORY: Chest pain and elevated d-dimer. Pulmonary embolus suspected. EXAM: CTA chest. Pulmonary embolus protocol. TECHNIQUE: Spiral images of the chest were performed from thoracic inlet to the  upper abdomen after administration of 100 cc of Isovue contrast media  intravenously according to a pulmonary embolus protocol. Coronal sagittal  constructions were provided. 3-D MIP images were performed on the independent  workstation and provided for evaluation. COMPARISON: None. All CT scans at this facility are performed using dose optimization technique as  appropriate to a performed exam, to include automated exposure control,  adjustment of the mA and/or kV according to patient size (including appropriate  matching for site-specific examinations), or use of iterative reconstruction  technique. TOTAL EXAM DLP: 576 mGy*cm. FINDINGS:    RV/LV: Unremarkable. Reflux: None. PA: Pulmonary outflow tract, main right and left pulmonary artery, lobar and  main segmental segmental branches demonstrate no evidence of filling defect to  suggest pulmonary embolus. Limited evaluation of subsegmental branches. THORACIC AORTA: Unremarkable . MEDIASTINUM: Nonspecific mediastinal nodes. Largest middle compartment superior  mediastinum lymph node on series 4 image 77 measuring approximately 1.6 x 1.2  cm. .    LUNGS: Unremarkable . PLEURA: Unremarkable . BONES: Unremarkable . CHEST WALL: Unremarkable . SOFT TISSUES: Nonspecific axillary adenopathy with largest lymph node in the  left axilla measuring approximately 1.5 x 1.1 cm. .    UPPER ABDOMEN: Unremarkable . Impression  1. No pulmonary embolus . 2. Nonspecific mediastinal and bilateral axillary adenopathy. Reactive versus  inflammatory versus neoplastic.  Correlation with patient's history and clinical  presentation. Pathology          Assessment:                                        No diagnosis found. Plan:                                        #      -- Today I have reviewed with the patient and family *** about the diagnosis and natural history of the disease. Plan:      -- The patient will be notified if any interventions is warranted. Further workup will be guided by results from aforementioned initial study. -- We will see the patient back in about *** weeks. Always sooner if required. Orders Placed This Encounter    PET/CT TUMOR IMAGE SKULL THIGH (SUB)     Standing Status:   Future     Standing Expiration Date:   4/69/1713    METABOLIC PANEL, COMPREHENSIVE     Standing Status:   Future     Number of Occurrences:   1     Standing Expiration Date:   1/31/2024    LD     Standing Status:   Future     Number of Occurrences:   1     Standing Expiration Date:   1/30/2024    CBC WITH AUTOMATED DIFF     Standing Status:   Future     Number of Occurrences:   1     Standing Expiration Date:   1/31/2024           Ms. Shireen Pedersen has a reminder for a \"due or due soon\" health maintenance. I have asked that she contact her primary care provider for follow-up on this health maintenance. All of patient's questions answered to their apparent satisfaction. They verbally show understanding and agreement with aforementioned plan. I would like to thank Dr Malia Stoddard, NP-C  for allowing me to participate in the care of this very pleasant patient. If I can be of further assistance please do not hesitate to call. Bertin Mario MD  1/30/2023              Above mentioned total time spent for this encounter with more than 50% of the time spent in face-to-face counseling, discussing on diagnosis and management plan going forward, and co-ordination of care. Parts of this document has been produced using Dragon dictation system.   Unrecognized errors in transcription may be present. Please do not hesitate to reach out for any questions or clarifications. They verbally show understanding and agreement with aforementioned plan. Jake Chavez MD  1/30/2023          Above mentioned total time spent for this encounter with more than 50% of the time spent in face-to-face counseling, discussing on diagnosis and management plan going forward, and co-ordination of care. Parts of this document has been produced using Dragon dictation system. Unrecognized errors in transcription may be present. Please do not hesitate to reach out for any questions or clarifications.       CC: LINNEA Raya

## 2023-01-30 NOTE — TELEPHONE ENCOUNTER
Received call from [facility] in [place].     They will fax a signed release of information form and would like the following items:  -PET scan from 2015  -CT scan from 2018  -OV note from Dr Borja Sept 2022    Please fax to [phone] [facility] Attn: Dr Arevalo, RN on 1/30/2023 at 10:20 AM

## 2023-02-06 ENCOUNTER — TRANSCRIBE ORDERS (OUTPATIENT)
Facility: HOSPITAL | Age: 78
End: 2023-02-06

## 2023-02-06 DIAGNOSIS — R59.0 AXILLARY ADENOPATHY: ICD-10-CM

## 2023-02-06 DIAGNOSIS — Z85.79 HISTORY OF LYMPHOMA: ICD-10-CM

## 2023-02-06 DIAGNOSIS — R59.0 MEDIASTINAL ADENOPATHY: Primary | ICD-10-CM

## 2023-02-06 DIAGNOSIS — R59.0 MEDIASTINAL ADENOPATHY: ICD-10-CM

## 2023-03-02 ENCOUNTER — HOSPITAL ENCOUNTER (OUTPATIENT)
Facility: HOSPITAL | Age: 78
End: 2023-03-02
Payer: MEDICARE

## 2023-03-02 DIAGNOSIS — Z85.79 HISTORY OF LYMPHOMA: ICD-10-CM

## 2023-03-02 DIAGNOSIS — R59.0 MEDIASTINAL ADENOPATHY: ICD-10-CM

## 2023-03-02 DIAGNOSIS — R59.0 AXILLARY ADENOPATHY: ICD-10-CM

## 2023-03-02 PROCEDURE — 2500000003 HC RX 250 WO HCPCS: Performed by: INTERNAL MEDICINE

## 2023-03-02 PROCEDURE — 78815 PET IMAGE W/CT SKULL-THIGH: CPT

## 2023-03-02 PROCEDURE — A9552 F18 FDG: HCPCS | Performed by: INTERNAL MEDICINE

## 2023-03-02 PROCEDURE — 3430000000 HC RX DIAGNOSTIC RADIOPHARMACEUTICAL: Performed by: INTERNAL MEDICINE

## 2023-03-02 RX ORDER — FLUDEOXYGLUCOSE F 18 200 MCI/ML
10 INJECTION, SOLUTION INTRAVENOUS
Status: COMPLETED | OUTPATIENT
Start: 2023-03-02 | End: 2023-03-02

## 2023-03-02 RX ADMIN — BARIUM SULFATE 450 ML: 21 SUSPENSION ORAL at 07:41

## 2023-03-02 RX ADMIN — FLUDEOXYGLUCOSE F 18 9.2 MILLICURIE: 200 INJECTION, SOLUTION INTRAVENOUS at 07:41

## 2023-03-09 ENCOUNTER — OFFICE VISIT (OUTPATIENT)
Age: 78
End: 2023-03-09
Payer: MEDICARE

## 2023-03-09 VITALS
RESPIRATION RATE: 16 BRPM | HEART RATE: 77 BPM | DIASTOLIC BLOOD PRESSURE: 77 MMHG | WEIGHT: 159 LBS | OXYGEN SATURATION: 97 % | SYSTOLIC BLOOD PRESSURE: 127 MMHG | HEIGHT: 62 IN | BODY MASS INDEX: 29.26 KG/M2

## 2023-03-09 DIAGNOSIS — R59.0 LOCALIZED ENLARGED LYMPH NODES: ICD-10-CM

## 2023-03-09 DIAGNOSIS — R59.0 MEDIASTINAL ADENOPATHY: Primary | ICD-10-CM

## 2023-03-09 DIAGNOSIS — Z85.79 PERSONAL HISTORY OF OTHER MALIGNANT NEOPLASMS OF LYMPHOID, HEMATOPOIETIC AND RELATED TISSUES: ICD-10-CM

## 2023-03-09 PROCEDURE — G8484 FLU IMMUNIZE NO ADMIN: HCPCS | Performed by: NURSE PRACTITIONER

## 2023-03-09 PROCEDURE — G8400 PT W/DXA NO RESULTS DOC: HCPCS | Performed by: NURSE PRACTITIONER

## 2023-03-09 PROCEDURE — 1123F ACP DISCUSS/DSCN MKR DOCD: CPT | Performed by: NURSE PRACTITIONER

## 2023-03-09 PROCEDURE — 99212 OFFICE O/P EST SF 10 MIN: CPT | Performed by: NURSE PRACTITIONER

## 2023-03-09 PROCEDURE — 99213 OFFICE O/P EST LOW 20 MIN: CPT | Performed by: NURSE PRACTITIONER

## 2023-03-09 PROCEDURE — G8427 DOCREV CUR MEDS BY ELIG CLIN: HCPCS | Performed by: NURSE PRACTITIONER

## 2023-03-09 PROCEDURE — G8417 CALC BMI ABV UP PARAM F/U: HCPCS | Performed by: NURSE PRACTITIONER

## 2023-03-09 PROCEDURE — 1090F PRES/ABSN URINE INCON ASSESS: CPT | Performed by: NURSE PRACTITIONER

## 2023-03-09 PROCEDURE — 1036F TOBACCO NON-USER: CPT | Performed by: NURSE PRACTITIONER

## 2023-03-09 RX ORDER — PREDNISONE 10 MG/1
TABLET ORAL DAILY
COMMUNITY

## 2023-03-09 RX ORDER — HYDROXYCHLOROQUINE SULFATE 200 MG/1
TABLET, FILM COATED ORAL DAILY
COMMUNITY
Start: 2017-02-06

## 2023-03-09 ASSESSMENT — ENCOUNTER SYMPTOMS
RESPIRATORY NEGATIVE: 1
EYES NEGATIVE: 1

## 2023-03-09 NOTE — PROGRESS NOTES
Hematology/Oncology  Progress Note    Name: Arielle Diaz  Date: 3/9/2023  : 1945    Gokul Guzman, KAROLINA     Ms. Jaquan Apodaca is a 76y.o. year old female who was seen for lymphadenopathy . Subjective:   History of Present Illness:   Ms. Jaquan Apodaca is a most pleasant 76y.o. year old female who was seen for consultation of lymphadenopathy. The patient has a past medical history significant of rheumatoid arthritis, lymphoma status post chemotherapy and radiation therapy. She has been taking prednisone 5 mg daily since  for rheumatoid arthritis. She has follow-up with her rheumatologist, and denied any rheumatoid arthritis flares up recently. She has history of B-cell lymphoma, diagnosis in 2015, and follow-up her previous hematologist in [place]. She reported she had received therapy R-CHOP and radiation therapy. She reported continuing follow-up with hematologist for cancer surveillance. It was reported last scan done in 2018 which showed no evidence of disease per patient. She was diagnosis COVID-19 during  time , and noted some weight loss. Her weight has been improving since then. Today she denied fever, chills, night sweat, unintentional weight loss, skin lumps or bumps, acute bleeding or bruising issues. Denied headache, acute vision change, dizziness, chest pain, worsen shortness of breath, palpitation, productive cough, nausea, vomiting, abdominal pain, altered bowel habits, dysuria, bone pain or back pain, new focal numbness or weakness. Past medical history, family history, and social history: these were reviewed and remains unchanged.     Past Medical History:   Diagnosis Date    Rheumatoid arthritis ([facility] [address]      Past Surgical History:   Procedure Laterality Date    TOTAL KNEE ARTHROPLASTY       Social History     Socioeconomic History    Marital status:      Spouse name: Not on file    Number of children: Not on file    Years of education: Not on file    Highest education level: Not on file   Occupational History    Not on file   Tobacco Use    Smoking status: Former     Packs/day: 3.00     Types: Cigarettes     Quit date: 1968     Years since quittin.2    Smokeless tobacco: Never   Vaping Use    Vaping Use: Never used   Substance and Sexual Activity    Alcohol use: Yes    Drug use: Never    Sexual activity: Not on file   Other Topics Concern    Not on file   Social History Narrative    Not on file     Social Determinants of Health     Financial Resource Strain: Not on file   Food Insecurity: Not on file   Transportation Needs: Not on file   Physical Activity: Not on file   Stress: Not on file   Social Connections: Not on file   Intimate Partner Violence: Not on file   Housing Stability: Not on file     Family History   Problem Relation Age of Onset    Cancer Sister         brain    Heart Attack Brother     COPD Mother      Current Outpatient Medications   Medication Sig Dispense Refill    hydroxychloroquine (PLAQUENIL) 200 MG tablet Take by mouth daily      predniSONE (DELTASONE) 10 MG tablet Take by mouth daily       No current facility-administered medications for this visit. Review of Systems   Constitutional: Negative. HENT: Negative. Eyes: Negative. Respiratory: Negative. Cardiovascular: Negative. Endocrine: Negative. Genitourinary: Negative. Musculoskeletal: Negative. Neurological: Negative. Hematological: Negative. Psychiatric/Behavioral: Negative. Objective:   /77   Pulse 77   Resp 16   Ht 5' 2\" (1.575 m)   Wt 159 lb (72.1 kg)   SpO2 97%   BMI 29.08 kg/m²     ECOG PS0  Physical Exam  Cardiovascular:      Rate and Rhythm: Normal rate. Musculoskeletal:         General: Normal range of motion. Cervical back: Normal range of motion. Skin:     General: Skin is warm. Neurological:      Mental Status: She is alert and oriented to person, place, and time.    Psychiatric: Mood and Affect: Mood normal.         Behavior: Behavior normal.         Thought Content: Thought content normal.         Judgment: Judgment normal.        Lab data:      Lab Results   Component Value Date    WBC 12.6 01/30/2023    HGB 11.2 (L) 01/30/2023    HCT 37.2 01/30/2023    MCV 84.4 01/30/2023     01/30/2023     Lab Results   Component Value Date     01/30/2023    K 4.4 01/30/2023     01/30/2023    CO2 30 01/30/2023    BUN 20 (H) 01/30/2023    CREATININE 0.70 01/30/2023    GLUCOSE 95 01/30/2023    CALCIUM 9.7 01/30/2023    PROT 6.4 01/30/2023    LABALBU 3.5 01/30/2023    BILITOT 0.3 01/30/2023    ALKPHOS 68 01/30/2023    AST 17 01/30/2023    ALT 22 01/30/2023    AGRATIO 1.2 01/30/2023    GLOB 2.9 01/30/2023               Assessment:     1. Mediastinal adenopathy          Plan:   # Lymphadenopathy/ Mediastinal adenopathy  # Hx of Lymphoma, B cell  -- Past medical history significant of rheumatoid arthritis, lymphoma status post chemotherapy and radiation therapy. -- She has been taking prednisone 5 mg daily since 2015 for rheumatoid arthritis. She has follow-up with her rheumatologist, and denied any rheumatoid arthritis flares up recently. -- She has history of B-cell lymphoma, diagnosis in 5/2015, and follow-up her previous hematologist in [place]. She reported she had received therapy R-CHOP and radiation therapy. -- She reported continuing follow-up with hematologist for cancer surveillance. It was reported last scan done in 2018 which showed no evidence of disease per patient. -- She was diagnosed COVID-19 during Thanksgiving time 2022, and noted some weight loss. Her weight has been improving lately. -- Today I have reviewed with the patient about above PET/CT . -- No PET evidence of lymphoma.  -The top normal sized left axillary lymph node is not hypermetabolic.  -There is no mediastinal adenopathy, or adenopathy elsewhere.    -- Hypermetabolic symmetric joint activity consistent with the history of  rheumatoid arthritis. Plan:  -- Lab check: CBC, chemistry, LDH.  -- We will try to obtain medical records from her previous hematologist for chart review. -- The patient will be notified if any interventions is warranted. Further workup will be guided by results from aforementioned initial study. --The patient was agreeable with the plan. -- We will see the patient back in about 1 year as requested by patient. Always sooner if required. No orders of the defined types were placed in this encounter. oJzef Alcazar DNP  3/9/2023      Please note: This document has been produced using voice recognition software. Unrecognized errors in transcription may be present.

## 2023-03-20 ENCOUNTER — HOSPITAL ENCOUNTER (OUTPATIENT)
Facility: HOSPITAL | Age: 78
Discharge: HOME | End: 2023-03-23

## 2023-03-20 LAB — LABCORP SPECIMEN COLLECTION: NORMAL

## 2023-03-20 PROCEDURE — 99001 SPECIMEN HANDLING PT-LAB: CPT

## 2023-03-21 PROBLEM — N28.1 RENAL CYST, LEFT: Status: ACTIVE | Noted: 2023-03-21

## 2023-03-21 PROBLEM — E04.1 THYROID NODULE: Status: ACTIVE | Noted: 2023-03-21

## 2023-03-21 PROBLEM — K76.89 HEPATIC CYST: Status: ACTIVE | Noted: 2023-03-21

## 2023-03-21 LAB
25(OH)D3+25(OH)D2 SERPL-MCNC: 38.4 NG/ML (ref 30–100)
BASOPHILS # BLD AUTO: 0.1 X10E3/UL (ref 0–0.2)
BASOPHILS NFR BLD AUTO: 1 %
CHOLEST SERPL-MCNC: 144 MG/DL (ref 100–199)
EOSINOPHIL # BLD AUTO: 0.2 X10E3/UL (ref 0–0.4)
EOSINOPHIL NFR BLD AUTO: 3 %
ERYTHROCYTE [DISTWIDTH] IN BLOOD BY AUTOMATED COUNT: 15 % (ref 11.7–15.4)
HCT VFR BLD AUTO: 36.5 % (ref 34–46.6)
HCV IGG SERPL QL IA: NON REACTIVE
HDLC SERPL-MCNC: 68 MG/DL
HGB BLD-MCNC: 11.1 G/DL (ref 11.1–15.9)
IMM GRANULOCYTES # BLD AUTO: 0 X10E3/UL (ref 0–0.1)
IMM GRANULOCYTES NFR BLD AUTO: 0 %
IMP & REVIEW OF LAB RESULTS: NORMAL
LDLC SERPL CALC-MCNC: 57 MG/DL (ref 0–99)
LYMPHOCYTES # BLD AUTO: 1.4 X10E3/UL (ref 0.7–3.1)
LYMPHOCYTES NFR BLD AUTO: 17 %
MCH RBC QN AUTO: 25.3 PG (ref 26.6–33)
MCHC RBC AUTO-ENTMCNC: 30.4 G/DL (ref 31.5–35.7)
MCV RBC AUTO: 83 FL (ref 79–97)
MONOCYTES # BLD AUTO: 0.6 X10E3/UL (ref 0.1–0.9)
MONOCYTES NFR BLD AUTO: 7 %
NEUTROPHILS # BLD AUTO: 6.1 X10E3/UL (ref 1.4–7)
NEUTROPHILS NFR BLD AUTO: 72 %
PLATELET # BLD AUTO: 309 X10E3/UL (ref 150–450)
RBC # BLD AUTO: 4.39 X10E6/UL (ref 3.77–5.28)
TRIGL SERPL-MCNC: 105 MG/DL (ref 0–149)
VLDLC SERPL CALC-MCNC: 19 MG/DL (ref 5–40)
WBC # BLD AUTO: 8.5 X10E3/UL (ref 3.4–10.8)

## 2023-03-21 ASSESSMENT — ENCOUNTER SYMPTOMS: SHORTNESS OF BREATH: 0

## 2023-03-22 ENCOUNTER — OFFICE VISIT (OUTPATIENT)
Facility: CLINIC | Age: 78
End: 2023-03-22

## 2023-03-22 VITALS
TEMPERATURE: 97.6 F | HEART RATE: 90 BPM | WEIGHT: 160 LBS | RESPIRATION RATE: 16 BRPM | OXYGEN SATURATION: 100 % | SYSTOLIC BLOOD PRESSURE: 119 MMHG | BODY MASS INDEX: 29.44 KG/M2 | DIASTOLIC BLOOD PRESSURE: 71 MMHG | HEIGHT: 62 IN

## 2023-03-22 DIAGNOSIS — L40.9 PSORIASIS: ICD-10-CM

## 2023-03-22 DIAGNOSIS — N28.1 RENAL CYST, LEFT: ICD-10-CM

## 2023-03-22 DIAGNOSIS — E04.1 THYROID NODULE: ICD-10-CM

## 2023-03-22 DIAGNOSIS — Z78.0 POSTMENOPAUSAL: ICD-10-CM

## 2023-03-22 DIAGNOSIS — Z71.89 ACP (ADVANCE CARE PLANNING): ICD-10-CM

## 2023-03-22 DIAGNOSIS — Z71.2 ENCOUNTER TO DISCUSS TEST RESULTS: ICD-10-CM

## 2023-03-22 DIAGNOSIS — Z00.00 MEDICARE ANNUAL WELLNESS VISIT, SUBSEQUENT: Primary | ICD-10-CM

## 2023-03-22 DIAGNOSIS — K76.89 HEPATIC CYST: ICD-10-CM

## 2023-03-22 RX ORDER — KETOCONAZOLE 20 MG/ML
SHAMPOO TOPICAL
Qty: 120 ML | Refills: 3 | Status: SHIPPED | OUTPATIENT
Start: 2023-03-22

## 2023-03-22 RX ORDER — AZELAIC ACID 0.2 G/G
CREAM CUTANEOUS PRN
Qty: 30 G | Refills: 3 | Status: SHIPPED | OUTPATIENT
Start: 2023-03-22

## 2023-03-22 SDOH — ECONOMIC STABILITY: FOOD INSECURITY: WITHIN THE PAST 12 MONTHS, THE FOOD YOU BOUGHT JUST DIDN'T LAST AND YOU DIDN'T HAVE MONEY TO GET MORE.: NEVER TRUE

## 2023-03-22 SDOH — ECONOMIC STABILITY: HOUSING INSECURITY
IN THE LAST 12 MONTHS, WAS THERE A TIME WHEN YOU DID NOT HAVE A STEADY PLACE TO SLEEP OR SLEPT IN A SHELTER (INCLUDING NOW)?: NO

## 2023-03-22 SDOH — ECONOMIC STABILITY: FOOD INSECURITY: WITHIN THE PAST 12 MONTHS, YOU WORRIED THAT YOUR FOOD WOULD RUN OUT BEFORE YOU GOT MONEY TO BUY MORE.: NEVER TRUE

## 2023-03-22 SDOH — ECONOMIC STABILITY: INCOME INSECURITY: HOW HARD IS IT FOR YOU TO PAY FOR THE VERY BASICS LIKE FOOD, HOUSING, MEDICAL CARE, AND HEATING?: NOT HARD AT ALL

## 2023-03-22 ASSESSMENT — PATIENT HEALTH QUESTIONNAIRE - PHQ9
SUM OF ALL RESPONSES TO PHQ QUESTIONS 1-9: 0
1. LITTLE INTEREST OR PLEASURE IN DOING THINGS: 0
SUM OF ALL RESPONSES TO PHQ9 QUESTIONS 1 & 2: 0
SUM OF ALL RESPONSES TO PHQ QUESTIONS 1-9: 0
2. FEELING DOWN, DEPRESSED OR HOPELESS: 0

## 2023-03-22 ASSESSMENT — LIFESTYLE VARIABLES
HOW OFTEN DO YOU HAVE A DRINK CONTAINING ALCOHOL: MONTHLY OR LESS
HOW MANY STANDARD DRINKS CONTAINING ALCOHOL DO YOU HAVE ON A TYPICAL DAY: 1 OR 2

## 2023-03-22 NOTE — PROGRESS NOTES
Advance Care Planning     Advance Care Planning (ACP) Physician/NP/PA Conversation    Date of Conversation: 3/22/2023  Conducted with: Patient with Decision Making Capacity    Healthcare Decision Maker:    Click here to complete Healthcare Decision Makers including selection of the Healthcare Decision Maker Relationship (ie \"Primary\")    Care Preferences:    Hospitalization: \"If your health worsens and it becomes clear that your chance of recovery is unlikely, what would be your preference regarding hospitalization? \"  The patient would prefer comfort-focused treatment without hospitalization. Ventilation: \"If you were unable to breath on your own and your chance of recovery was unlikely, what would be your preference about the use of a ventilator (breathing machine) if it was available to you? \"  The patient is unsure. Resuscitation: \"In the event your heart stopped as a result of an underlying serious health condition, would you want attempts made to restart your heart, or would you prefer a natural death? \"  Yes, attempt to resuscitate.     Conversation Outcomes / Follow-Up Plan:  ACP complete - no further action today    Length of Voluntary ACP Conversation in minutes:  16 minutes    KAROLINA Bryant
Janiya Weathers presents today for   Chief Complaint   Patient presents with    Medicare [address]       Is someone accompanying this pt? no    Is the patient using any DME equipment during OV? no    Depression Screening:  PHQ-9 Questionaire 3/22/2023 1/17/2023   Little interest or pleasure in doing things 0 0   Feeling down, depressed, or hopeless 0 0   PHQ-9 Total Score 0 0        GISSELLE 7-Anxiety   No flowsheet data found. Learning Assessment:  No question data found. Fall Risk  No flowsheet data found. Travel Screening:    Travel Screening     No screening recorded since 03/21/23 0000       Travel History   Travel since 02/22/23    No documented travel since 02/22/23          Health Maintenance reviewed and discussed and ordered per Provider. Social Determinants of Health     Tobacco Use: Medium Risk    Smoking Tobacco Use: Former    Smokeless Tobacco Use: Never    Passive Exposure: Not on file   Alcohol Use: Not At Risk    Frequency of Alcohol Consumption: Monthly or less    Average Number of Drinks: 1 or 2    Frequency of Binge Drinking: Never   Financial Resource Strain: Low Risk     Difficulty of Paying Living Expenses: Not hard at all   Food Insecurity: No Food Insecurity    Worried About Running Out of Food in the Last Year: Never true    Ran Out of Food in the Last Year: Never true   Transportation Needs: Unknown    Lack of Transportation (Medical): Not on file    Lack of Transportation (Non-Medical):  No   Physical Activity: Not on file   Stress: Not on file   Social Connections: Not on file   Intimate Partner Violence: Not on file   Depression: Not at risk    PHQ-2 Score: 0   Housing Stability: Unknown    Unable to Pay for Housing in the Last Year: Not on file    Number of Places Lived in the Last Year: Not on file    Unstable Housing in the Last Year: No        Health Maintenance Due   Topic Date Due    Shingles vaccine (1 of 2) Never done    DEXA (modify frequency per FRAX score)
BP: 119/71   Site: Right Upper Arm   Position: Sitting   Cuff Size: Small Adult   Pulse: 90   Resp: 16   Temp: 97.6 °F (36.4 °C)   TempSrc: Temporal   SpO2: 100%   Weight: 160 lb (72.6 kg)   Height: 5' 2\" (1.575 m)     Physical Exam  Vitals and nursing note reviewed. Constitutional:       General: She is not in acute distress. Appearance: She is not ill-appearing. HENT:      Head: Normocephalic and atraumatic. Cardiovascular:      Rate and Rhythm: Normal rate and regular rhythm. Pulmonary:      Effort: Pulmonary effort is normal. No respiratory distress. Breath sounds: No wheezing, rhonchi or rales. Musculoskeletal:         General: Normal range of motion. Skin:     General: Skin is warm and dry. Neurological:      General: No focal deficit present. Mental Status: She is alert. Psychiatric:         Mood and Affect: Mood normal.         Thought Content:  Thought content normal.         Judgment: Judgment normal.     LONDON LombardiC
needed for psoriasis outbreak) After skin is washed and patted dry, apply a thin film of cream to affected area twice daily, morning and evening.  Yes KAROLINA Mckeon   hydroxychloroquine (PLAQUENIL) 200 MG tablet Take by mouth daily Yes Historical Provider, MD   predniSONE (DELTASONE) 5 MG tablet Take 5 mg by mouth daily Yes Historical Provider, MD Riley (Including outside providers/suppliers regularly involved in providing care):   Patient Care Team:  KAROLINA Mckeon as PCP - [facility] [employer] [facility], NP-C as PCP - [facility] Provider     Reviewed and updated this visit:  Tobacco  Allergies  Meds  Problems  Med Hx  Surg Hx  Soc Hx  Fam Hx        KAROLINA Mckeon

## 2023-03-30 ENCOUNTER — HOSPITAL ENCOUNTER (OUTPATIENT)
Facility: HOSPITAL | Age: 78
End: 2023-03-30
Payer: MEDICARE

## 2023-03-30 ENCOUNTER — HOSPITAL ENCOUNTER (OUTPATIENT)
Facility: HOSPITAL | Age: 78
Discharge: HOME | End: 2023-03-30
Payer: MEDICARE

## 2023-03-30 DIAGNOSIS — E04.1 THYROID NODULE: ICD-10-CM

## 2023-03-30 DIAGNOSIS — N28.1 RENAL CYST, LEFT: ICD-10-CM

## 2023-03-30 DIAGNOSIS — K76.89 HEPATIC CYST: ICD-10-CM

## 2023-03-30 PROCEDURE — 76536 US EXAM OF HEAD AND NECK: CPT

## 2023-03-30 PROCEDURE — 76700 US EXAM ABDOM COMPLETE: CPT

## 2023-04-01 ENCOUNTER — HEALTH MAINTENANCE LETTER (OUTPATIENT)
Age: 78
End: 2023-04-01

## 2023-04-04 ENCOUNTER — TELEPHONE (OUTPATIENT)
Facility: CLINIC | Age: 78
End: 2023-04-04

## 2023-04-04 DIAGNOSIS — N28.1 BILATERAL RENAL CYSTS: Primary | ICD-10-CM

## 2023-04-04 DIAGNOSIS — E04.1 THYROID NODULE: Primary | ICD-10-CM

## 2023-04-04 NOTE — TELEPHONE ENCOUNTER
----- Message from KAROLINA Martinez sent at 4/4/2023  7:59 AM EDT -----  Thyroid nodules confirmed on ultrasound, radiologist recommending possible biopsy. Referral ordered for ENT, please provide patient with contact info for scheduling. Thanks.

## 2023-04-04 NOTE — TELEPHONE ENCOUNTER
----- Message from KAROLINA Garcia sent at 4/4/2023  8:01 AM EDT -----  Ultrasound abdomen confirmed renal cysts on both sides. Referral to urology has been ordered for further eval.  Please provide patient with contact info for scheduling. Thanks.

## 2023-05-18 ENCOUNTER — HOSPITAL ENCOUNTER (OUTPATIENT)
Facility: HOSPITAL | Age: 78
Discharge: HOME | End: 2023-05-18
Payer: MEDICARE

## 2023-05-18 DIAGNOSIS — E04.1 NONTOXIC SINGLE THYROID NODULE: ICD-10-CM

## 2023-05-18 PROCEDURE — 10005 FNA BX W/US GDN 1ST LES: CPT

## 2023-05-18 PROCEDURE — 88172 CYTP DX EVAL FNA 1ST EA SITE: CPT

## 2023-05-18 PROCEDURE — 88305 TISSUE EXAM BY PATHOLOGIST: CPT

## 2023-05-18 PROCEDURE — 88177 CYTP FNA EVAL EA ADDL: CPT

## 2023-05-18 PROCEDURE — 88173 CYTOPATH EVAL FNA REPORT: CPT

## 2023-06-08 ENCOUNTER — HOSPITAL ENCOUNTER (OUTPATIENT)
Facility: HOSPITAL | Age: 78
Discharge: HOME | End: 2023-06-08
Payer: MEDICARE

## 2023-06-08 DIAGNOSIS — N28.1 BILATERAL RENAL CYSTS: ICD-10-CM

## 2023-06-08 DIAGNOSIS — R31.0 GROSS HEMATURIA: ICD-10-CM

## 2023-06-08 LAB — CREAT UR-MCNC: 0.7 MG/DL (ref 0.6–1.3)

## 2023-06-08 PROCEDURE — 6360000004 HC RX CONTRAST MEDICATION: Performed by: PHYSICIAN ASSISTANT

## 2023-06-08 PROCEDURE — 82565 ASSAY OF CREATININE: CPT

## 2023-06-08 PROCEDURE — 74178 CT ABD&PLV WO CNTR FLWD CNTR: CPT | Performed by: PHYSICIAN ASSISTANT

## 2023-06-08 RX ADMIN — IOPAMIDOL 100 ML: 755 INJECTION, SOLUTION INTRAVENOUS at 09:06

## 2023-07-14 PROBLEM — H34.8112 CENTRAL RETINAL VEIN OCCLUSION, RIGHT EYE, STABLE: Status: ACTIVE | Noted: 2023-07-06

## 2023-07-14 PROBLEM — H35.363 DRUSEN OF MACULA, BILATERAL: Status: ACTIVE | Noted: 2023-07-06

## 2023-07-14 PROBLEM — H47.011 ISCHEMIC OPTIC NEUROPATHY, RIGHT EYE: Status: ACTIVE | Noted: 2023-07-06

## 2023-07-27 ENCOUNTER — HOSPITAL ENCOUNTER (OUTPATIENT)
Facility: HOSPITAL | Age: 78
Discharge: HOME | End: 2023-07-27
Payer: MEDICARE

## 2023-07-27 DIAGNOSIS — S83.241D ACUTE MEDIAL MENISCAL TEAR, RIGHT, SUBSEQUENT ENCOUNTER: ICD-10-CM

## 2023-07-27 DIAGNOSIS — M48.07 SPINAL STENOSIS, LUMBOSACRAL REGION: ICD-10-CM

## 2023-07-27 DIAGNOSIS — M87.052 IDIOPATHIC ASEPTIC NECROSIS OF LEFT FEMUR (HCC): ICD-10-CM

## 2023-07-27 DIAGNOSIS — S83.242D ACUTE MEDIAL MENISCAL TEAR, LEFT, SUBSEQUENT ENCOUNTER: ICD-10-CM

## 2023-07-27 PROCEDURE — 73564 X-RAY EXAM KNEE 4 OR MORE: CPT

## 2023-07-27 PROCEDURE — 73521 X-RAY EXAM HIPS BI 2 VIEWS: CPT

## 2023-07-27 PROCEDURE — 72110 X-RAY EXAM L-2 SPINE 4/>VWS: CPT

## 2023-08-16 ENCOUNTER — PATIENT OUTREACH (OUTPATIENT)
Dept: ONCOLOGY | Facility: CLINIC | Age: 78
End: 2023-08-16
Payer: COMMERCIAL

## 2023-08-16 NOTE — PROGRESS NOTES
Per review of schedules, patient has 1 year follow up visits scheduled for both 8/30 and 9/18.  Contacted patient to see if both appointments are needed.  Patient states she was going to call, she has moved to [place], [place] and was not feeling well and was seen by an Oncologist locally.  She has been evaluated by an Oncologist and had imaging, states nothing was found.  She doesn't know that she will continue with the Oncologist there, but feels her evaluation was sufficient for now.  She requests that the appointments be cancelled.  She will request to have the records from [place] sent to [facility] to keep Dr. Borja updated.  Appointments cancelled as requested by patient.    Basia Ravi RN

## 2023-10-03 ENCOUNTER — TELEPHONE (OUTPATIENT)
Dept: ONCOLOGY | Facility: CLINIC | Age: 78
End: 2023-10-03
Payer: COMMERCIAL

## 2023-10-03 NOTE — TELEPHONE ENCOUNTER
Estefani called in this morning to cancel her 1 year follow up and labs as she's not currently in the state and did not want to reschedule at this time. She said she will call us if she'd like to reschedule at a later date. HB

## 2023-10-30 ENCOUNTER — HOSPITAL ENCOUNTER (OUTPATIENT)
Facility: HOSPITAL | Age: 78
Discharge: HOME | End: 2023-11-02
Payer: MEDICARE

## 2023-10-30 DIAGNOSIS — M81.0 SENILE OSTEOPOROSIS: ICD-10-CM

## 2023-10-30 PROCEDURE — 77080 DXA BONE DENSITY AXIAL: CPT

## 2024-03-08 ENCOUNTER — HOSPITAL ENCOUNTER (OUTPATIENT)
Facility: HOSPITAL | Age: 79
End: 2024-03-08
Payer: MEDICARE

## 2024-03-08 DIAGNOSIS — M48.07 SPINAL STENOSIS, LUMBOSACRAL REGION: ICD-10-CM

## 2024-03-08 DIAGNOSIS — M54.17 RADICULOPATHY OF LUMBOSACRAL REGION: ICD-10-CM

## 2024-03-08 PROCEDURE — 72148 MRI LUMBAR SPINE W/O DYE: CPT

## 2024-03-22 ENCOUNTER — OFFICE VISIT (OUTPATIENT)
Facility: CLINIC | Age: 79
End: 2024-03-22
Payer: MEDICARE

## 2024-03-22 VITALS
RESPIRATION RATE: 16 BRPM | WEIGHT: 161.6 LBS | HEART RATE: 78 BPM | SYSTOLIC BLOOD PRESSURE: 134 MMHG | OXYGEN SATURATION: 99 % | BODY MASS INDEX: 29.56 KG/M2 | TEMPERATURE: 97.1 F | DIASTOLIC BLOOD PRESSURE: 61 MMHG

## 2024-03-22 DIAGNOSIS — Z13.220 SCREENING FOR LIPID DISORDERS: ICD-10-CM

## 2024-03-22 DIAGNOSIS — E04.1 THYROID NODULE: Primary | ICD-10-CM

## 2024-03-22 DIAGNOSIS — K76.89 HEPATIC CYST: ICD-10-CM

## 2024-03-22 DIAGNOSIS — N28.1 RENAL CYST, LEFT: ICD-10-CM

## 2024-03-22 DIAGNOSIS — M06.9 RHEUMATOID ARTHRITIS, INVOLVING UNSPECIFIED SITE, UNSPECIFIED WHETHER RHEUMATOID FACTOR PRESENT (HCC): ICD-10-CM

## 2024-03-22 PROCEDURE — 1036F TOBACCO NON-USER: CPT

## 2024-03-22 PROCEDURE — G8484 FLU IMMUNIZE NO ADMIN: HCPCS

## 2024-03-22 PROCEDURE — 99213 OFFICE O/P EST LOW 20 MIN: CPT

## 2024-03-22 PROCEDURE — G8427 DOCREV CUR MEDS BY ELIG CLIN: HCPCS

## 2024-03-22 PROCEDURE — G8417 CALC BMI ABV UP PARAM F/U: HCPCS

## 2024-03-22 PROCEDURE — 1090F PRES/ABSN URINE INCON ASSESS: CPT

## 2024-03-22 PROCEDURE — 1123F ACP DISCUSS/DSCN MKR DOCD: CPT

## 2024-03-22 PROCEDURE — G8399 PT W/DXA RESULTS DOCUMENT: HCPCS

## 2024-03-22 SDOH — ECONOMIC STABILITY: FOOD INSECURITY: WITHIN THE PAST 12 MONTHS, YOU WORRIED THAT YOUR FOOD WOULD RUN OUT BEFORE YOU GOT MONEY TO BUY MORE.: NEVER TRUE

## 2024-03-22 SDOH — ECONOMIC STABILITY: FOOD INSECURITY: WITHIN THE PAST 12 MONTHS, THE FOOD YOU BOUGHT JUST DIDN'T LAST AND YOU DIDN'T HAVE MONEY TO GET MORE.: NEVER TRUE

## 2024-03-22 SDOH — ECONOMIC STABILITY: INCOME INSECURITY: HOW HARD IS IT FOR YOU TO PAY FOR THE VERY BASICS LIKE FOOD, HOUSING, MEDICAL CARE, AND HEATING?: NOT HARD AT ALL

## 2024-03-22 ASSESSMENT — PATIENT HEALTH QUESTIONNAIRE - PHQ9
SUM OF ALL RESPONSES TO PHQ QUESTIONS 1-9: 0
2. FEELING DOWN, DEPRESSED OR HOPELESS: NOT AT ALL
SUM OF ALL RESPONSES TO PHQ QUESTIONS 1-9: 0
SUM OF ALL RESPONSES TO PHQ9 QUESTIONS 1 & 2: 0
SUM OF ALL RESPONSES TO PHQ QUESTIONS 1-9: 0
1. LITTLE INTEREST OR PLEASURE IN DOING THINGS: NOT AT ALL
SUM OF ALL RESPONSES TO PHQ QUESTIONS 1-9: 0

## 2024-03-22 ASSESSMENT — ENCOUNTER SYMPTOMS: SHORTNESS OF BREATH: 0

## 2024-03-22 NOTE — PROGRESS NOTES
Chief Complaint   Patient presents with    Thyroid Problem    hepatic cyst     Kidney Cyst    Rhematoid arthritis      Assessment & Plan:     1. Thyroid nodule  Assessment & Plan:  Continue management per ENT  Orders:  -     CBC with Auto Differential; Future  2. Hepatic cyst  Assessment & Plan:  Continue management per GI  Orders:  -     CBC with Auto Differential; Future  -     Comprehensive Metabolic Panel; Future  3. Renal cyst, left  Assessment & Plan:  Continue management per urology  Orders:  -     CBC with Auto Differential; Future  -     Comprehensive Metabolic Panel; Future  4. Rheumatoid arthritis, involving unspecified site, unspecified whether rheumatoid factor present (HCC)  Assessment & Plan:  Continue management per rheumatology   Orders:  -     CBC with Auto Differential; Future  -     Comprehensive Metabolic Panel; Future  5. Screening for lipid disorders  -     Comprehensive Metabolic Panel; Future  -     Lipid Panel; Future    Follow-up and Dispositions    Return in about 6 weeks (around 5/3/2024) for Lab results, VIRTUAL VISIT.       Subjective:     HPI    Health Maintenance  Shingles vaccine- recommended  RSV vaccine- recommended  COVID vaccine-due for booster  AWV- declined, states she does not to complete this    Small thyroid nodules  History of thyroid disorders: none  Followed by ENT: yes, was told everything was fine  Was told that everything was fine     Right hepatic cysts  History of liver dysfunction: none  Symptoms: none  Followed by GI: yes, states everything turned out fine    Left renal cyst  History of renal cysts: none  Symptoms: none  Followed by nephrology: yes, states she was told they have to keep a close eye on the kidney cysts     Rheumatoid Arthritis  Symptoms: states she always has pain  Treatment: plaquenil and prednisone  Followed by rheumatology: yes, Dr. Tesfaye in [place], also in [place], [place], sees him remotely  States her rheumatologist is wanting her start 
ERROR.   
01/01/2024    Depression Screen  03/22/2024   .      \"Have you been to the ER, urgent care clinic since your last visit?  Hospitalized since your last visit?\"    NO    “Have you seen or consulted any other health care providers outside of [facility] since your last visit?”    YES - When: approximately the last year  ago.  Where and Why: rheumatology and urology .

## 2024-06-01 ENCOUNTER — HEALTH MAINTENANCE LETTER (OUTPATIENT)
Age: 79
End: 2024-06-01

## 2024-12-30 ENCOUNTER — MYC MEDICAL ADVICE (OUTPATIENT)
Dept: ORTHOPEDICS | Facility: CLINIC | Age: 79
End: 2024-12-30
Payer: COMMERCIAL

## 2024-12-30 DIAGNOSIS — Z96.60 S/P JOINT REPLACEMENT: ICD-10-CM

## 2024-12-31 RX ORDER — AMOXICILLIN 500 MG/1
CAPSULE ORAL
Qty: 8 CAPSULE | Refills: 1 | Status: SHIPPED | OUTPATIENT
Start: 2024-12-31

## 2025-06-14 ENCOUNTER — HEALTH MAINTENANCE LETTER (OUTPATIENT)
Age: 80
End: 2025-06-14

## 2025-06-17 NOTE — TELEPHONE ENCOUNTER
Spoke to pt to schedule appointment with Dr. Martin.     Maria Esther Franco on 7/11/2022 at 10:34 AM    No